# Patient Record
Sex: FEMALE | Race: WHITE | NOT HISPANIC OR LATINO | URBAN - METROPOLITAN AREA
[De-identification: names, ages, dates, MRNs, and addresses within clinical notes are randomized per-mention and may not be internally consistent; named-entity substitution may affect disease eponyms.]

---

## 2018-01-19 ENCOUNTER — INPATIENT (INPATIENT)
Facility: HOSPITAL | Age: 83
LOS: 13 days | Discharge: NURSING, REHAB OR LONG-TERM CARE | DRG: 640 | End: 2018-02-02
Attending: INTERNAL MEDICINE | Admitting: INTERNAL MEDICINE
Payer: MEDICARE

## 2018-01-19 VITALS
HEIGHT: 64 IN | TEMPERATURE: 98 F | WEIGHT: 139.99 LBS | RESPIRATION RATE: 16 BRPM | HEART RATE: 84 BPM | SYSTOLIC BLOOD PRESSURE: 100 MMHG | DIASTOLIC BLOOD PRESSURE: 66 MMHG | OXYGEN SATURATION: 96 %

## 2018-01-19 LAB
ALBUMIN SERPL ELPH-MCNC: 2.3 G/DL — LOW (ref 3.5–5)
ALP SERPL-CCNC: 135 U/L — HIGH (ref 40–120)
ALT FLD-CCNC: 15 U/L DA — SIGNIFICANT CHANGE UP (ref 10–60)
ANION GAP SERPL CALC-SCNC: 7 MMOL/L — SIGNIFICANT CHANGE UP (ref 5–17)
APPEARANCE UR: CLEAR — SIGNIFICANT CHANGE UP
AST SERPL-CCNC: 41 U/L — HIGH (ref 10–40)
BASOPHILS # BLD AUTO: 0.1 K/UL — SIGNIFICANT CHANGE UP (ref 0–0.2)
BASOPHILS NFR BLD AUTO: 1.3 % — SIGNIFICANT CHANGE UP (ref 0–2)
BILIRUB SERPL-MCNC: 0.4 MG/DL — SIGNIFICANT CHANGE UP (ref 0.2–1.2)
BILIRUB UR-MCNC: NEGATIVE — SIGNIFICANT CHANGE UP
BUN SERPL-MCNC: 30 MG/DL — HIGH (ref 7–18)
CALCIUM SERPL-MCNC: 8.7 MG/DL — SIGNIFICANT CHANGE UP (ref 8.4–10.5)
CHLORIDE SERPL-SCNC: 113 MMOL/L — HIGH (ref 96–108)
CO2 SERPL-SCNC: 27 MMOL/L — SIGNIFICANT CHANGE UP (ref 22–31)
COLOR SPEC: YELLOW — SIGNIFICANT CHANGE UP
CREAT SERPL-MCNC: 0.86 MG/DL — SIGNIFICANT CHANGE UP (ref 0.5–1.3)
DIFF PNL FLD: NEGATIVE — SIGNIFICANT CHANGE UP
EOSINOPHIL # BLD AUTO: 0 K/UL — SIGNIFICANT CHANGE UP (ref 0–0.5)
EOSINOPHIL NFR BLD AUTO: 0.8 % — SIGNIFICANT CHANGE UP (ref 0–6)
GLUCOSE SERPL-MCNC: 83 MG/DL — SIGNIFICANT CHANGE UP (ref 70–99)
GLUCOSE UR QL: NEGATIVE — SIGNIFICANT CHANGE UP
HCT VFR BLD CALC: 41.9 % — SIGNIFICANT CHANGE UP (ref 34.5–45)
HGB BLD-MCNC: 12.8 G/DL — SIGNIFICANT CHANGE UP (ref 11.5–15.5)
KETONES UR-MCNC: NEGATIVE — SIGNIFICANT CHANGE UP
LACTATE SERPL-SCNC: 1.4 MMOL/L — SIGNIFICANT CHANGE UP (ref 0.7–2)
LEUKOCYTE ESTERASE UR-ACNC: ABNORMAL
LYMPHOCYTES # BLD AUTO: 2.5 K/UL — SIGNIFICANT CHANGE UP (ref 1–3.3)
LYMPHOCYTES # BLD AUTO: 40.1 % — SIGNIFICANT CHANGE UP (ref 13–44)
MCHC RBC-ENTMCNC: 29.5 PG — SIGNIFICANT CHANGE UP (ref 27–34)
MCHC RBC-ENTMCNC: 30.4 GM/DL — LOW (ref 32–36)
MCV RBC AUTO: 96.8 FL — SIGNIFICANT CHANGE UP (ref 80–100)
MONOCYTES # BLD AUTO: 0.8 K/UL — SIGNIFICANT CHANGE UP (ref 0–0.9)
MONOCYTES NFR BLD AUTO: 12.9 % — SIGNIFICANT CHANGE UP (ref 2–14)
NEUTROPHILS # BLD AUTO: 2.8 K/UL — SIGNIFICANT CHANGE UP (ref 1.8–7.4)
NEUTROPHILS NFR BLD AUTO: 44.9 % — SIGNIFICANT CHANGE UP (ref 43–77)
NITRITE UR-MCNC: POSITIVE
PH UR: 5 — SIGNIFICANT CHANGE UP (ref 5–8)
PLATELET # BLD AUTO: 286 K/UL — SIGNIFICANT CHANGE UP (ref 150–400)
POTASSIUM SERPL-MCNC: 5 MMOL/L — SIGNIFICANT CHANGE UP (ref 3.5–5.3)
POTASSIUM SERPL-SCNC: 5 MMOL/L — SIGNIFICANT CHANGE UP (ref 3.5–5.3)
PROT SERPL-MCNC: 6.9 G/DL — SIGNIFICANT CHANGE UP (ref 6–8.3)
PROT UR-MCNC: 15
RBC # BLD: 4.33 M/UL — SIGNIFICANT CHANGE UP (ref 3.8–5.2)
RBC # FLD: 15.3 % — HIGH (ref 10.3–14.5)
SODIUM SERPL-SCNC: 147 MMOL/L — HIGH (ref 135–145)
SP GR SPEC: 1.02 — SIGNIFICANT CHANGE UP (ref 1.01–1.02)
UROBILINOGEN FLD QL: 1
WBC # BLD: 6.1 K/UL — SIGNIFICANT CHANGE UP (ref 3.8–10.5)
WBC # FLD AUTO: 6.1 K/UL — SIGNIFICANT CHANGE UP (ref 3.8–10.5)

## 2018-01-19 PROCEDURE — 71045 X-RAY EXAM CHEST 1 VIEW: CPT | Mod: 26

## 2018-01-19 PROCEDURE — 99285 EMERGENCY DEPT VISIT HI MDM: CPT

## 2018-01-19 RX ORDER — SODIUM CHLORIDE 9 MG/ML
1000 INJECTION INTRAMUSCULAR; INTRAVENOUS; SUBCUTANEOUS ONCE
Qty: 0 | Refills: 0 | Status: COMPLETED | OUTPATIENT
Start: 2018-01-19 | End: 2018-01-19

## 2018-01-19 RX ORDER — SODIUM CHLORIDE 9 MG/ML
3 INJECTION INTRAMUSCULAR; INTRAVENOUS; SUBCUTANEOUS ONCE
Qty: 0 | Refills: 0 | Status: COMPLETED | OUTPATIENT
Start: 2018-01-19 | End: 2018-01-19

## 2018-01-19 RX ORDER — CEFTRIAXONE 500 MG/1
1 INJECTION, POWDER, FOR SOLUTION INTRAMUSCULAR; INTRAVENOUS ONCE
Qty: 0 | Refills: 0 | Status: COMPLETED | OUTPATIENT
Start: 2018-01-19 | End: 2018-01-19

## 2018-01-19 RX ADMIN — SODIUM CHLORIDE 3 MILLILITER(S): 9 INJECTION INTRAMUSCULAR; INTRAVENOUS; SUBCUTANEOUS at 22:26

## 2018-01-19 RX ADMIN — SODIUM CHLORIDE 1000 MILLILITER(S): 9 INJECTION INTRAMUSCULAR; INTRAVENOUS; SUBCUTANEOUS at 23:48

## 2018-01-19 NOTE — ED ADULT TRIAGE NOTE - CHIEF COMPLAINT QUOTE
Patient was sent from nursing home for evaluation of failure to thrive, poor appetite, low oxygen saturation

## 2018-01-19 NOTE — ED ADULT NURSE NOTE - OBJECTIVE STATEMENT
Patient presents to ED from nursing home for PEG tube placement, and fever for several days in nursing home. Abdomen is soft, non tender, non distended. Daughter at bedside. Patient is awake, incoherent speech when asked questions. Moving all extremities. Breathing easy and unlabored, no use of accessory muscles.

## 2018-01-19 NOTE — ED PROVIDER NOTE - CARE PLAN
Principal Discharge DX:	UTI (urinary tract infection)  Secondary Diagnosis:	Failure to thrive in adult

## 2018-01-19 NOTE — ED PROVIDER NOTE - OBJECTIVE STATEMENT
87 y/o F pt BIB EMS for failure to thrive and need for peg tube placement; also reported fever for "several days." Nursing home staff attempted to place an IV today but were unsuccessful. Limited HPI due to dementia.l 89 y/o F pt BIB EMS for failure to thrive and need for peg tube placement; also reported fever for "several days." Nursing home staff attempted to place an IV today but were unsuccessful. Limited HPI due to dementia.

## 2018-01-19 NOTE — ED PROVIDER NOTE - MEDICAL DECISION MAKING DETAILS
87 y/o F pt brought in for failure to thrive. Will admit for infectious work up and peg tube placement. 89 y/o F pt brought in for failure to thrive. Will admit for infectious work up and peg tube placement.  labs reveal Urinary tract infection. admit for IV hydration IV antibiotics and possible peg placement

## 2018-01-20 DIAGNOSIS — I10 ESSENTIAL (PRIMARY) HYPERTENSION: ICD-10-CM

## 2018-01-20 DIAGNOSIS — R62.7 ADULT FAILURE TO THRIVE: ICD-10-CM

## 2018-01-20 DIAGNOSIS — Z29.9 ENCOUNTER FOR PROPHYLACTIC MEASURES, UNSPECIFIED: ICD-10-CM

## 2018-01-20 DIAGNOSIS — F03.90 UNSPECIFIED DEMENTIA WITHOUT BEHAVIORAL DISTURBANCE: ICD-10-CM

## 2018-01-20 DIAGNOSIS — K59.00 CONSTIPATION, UNSPECIFIED: ICD-10-CM

## 2018-01-20 DIAGNOSIS — N39.0 URINARY TRACT INFECTION, SITE NOT SPECIFIED: ICD-10-CM

## 2018-01-20 DIAGNOSIS — F32.9 MAJOR DEPRESSIVE DISORDER, SINGLE EPISODE, UNSPECIFIED: ICD-10-CM

## 2018-01-20 LAB
24R-OH-CALCIDIOL SERPL-MCNC: 44.6 NG/ML — SIGNIFICANT CHANGE UP (ref 30–80)
CHOLEST SERPL-MCNC: 139 MG/DL — SIGNIFICANT CHANGE UP (ref 10–199)
FOLATE SERPL-MCNC: 19 NG/ML — SIGNIFICANT CHANGE UP (ref 4.8–24.2)
GLUCOSE BLDC GLUCOMTR-MCNC: 102 MG/DL — HIGH (ref 70–99)
GLUCOSE BLDC GLUCOMTR-MCNC: 131 MG/DL — HIGH (ref 70–99)
GLUCOSE BLDC GLUCOMTR-MCNC: 182 MG/DL — HIGH (ref 70–99)
GLUCOSE BLDC GLUCOMTR-MCNC: 75 MG/DL — SIGNIFICANT CHANGE UP (ref 70–99)
GLUCOSE BLDC GLUCOMTR-MCNC: 88 MG/DL — SIGNIFICANT CHANGE UP (ref 70–99)
GLUCOSE BLDC GLUCOMTR-MCNC: 95 MG/DL — SIGNIFICANT CHANGE UP (ref 70–99)
HBA1C BLD-MCNC: 5.9 % — HIGH (ref 4–5.6)
HDLC SERPL-MCNC: 39 MG/DL — LOW (ref 40–125)
INR BLD: 1.1 RATIO — SIGNIFICANT CHANGE UP (ref 0.88–1.16)
LIPID PNL WITH DIRECT LDL SERPL: 78 MG/DL — SIGNIFICANT CHANGE UP
MAGNESIUM SERPL-MCNC: 2.3 MG/DL — SIGNIFICANT CHANGE UP (ref 1.6–2.6)
PHOSPHATE SERPL-MCNC: 3 MG/DL — SIGNIFICANT CHANGE UP (ref 2.5–4.5)
PROTHROM AB SERPL-ACNC: 12 SEC — SIGNIFICANT CHANGE UP (ref 9.8–12.7)
TOTAL CHOLESTEROL/HDL RATIO MEASUREMENT: 3.6 RATIO — SIGNIFICANT CHANGE UP (ref 3.3–7.1)
TRIGL SERPL-MCNC: 111 MG/DL — SIGNIFICANT CHANGE UP (ref 10–149)
TSH SERPL-MCNC: 0.4 UU/ML — SIGNIFICANT CHANGE UP (ref 0.34–4.82)
VIT B12 SERPL-MCNC: 1278 PG/ML — HIGH (ref 232–1245)

## 2018-01-20 RX ORDER — DEXTROSE 50 % IN WATER 50 %
25 SYRINGE (ML) INTRAVENOUS ONCE
Qty: 0 | Refills: 0 | Status: DISCONTINUED | OUTPATIENT
Start: 2018-01-20 | End: 2018-02-02

## 2018-01-20 RX ORDER — ASCORBIC ACID 60 MG
500 TABLET,CHEWABLE ORAL DAILY
Qty: 0 | Refills: 0 | Status: DISCONTINUED | OUTPATIENT
Start: 2018-01-20 | End: 2018-02-02

## 2018-01-20 RX ORDER — ACETAMINOPHEN 500 MG
650 TABLET ORAL EVERY 6 HOURS
Qty: 0 | Refills: 0 | Status: DISCONTINUED | OUTPATIENT
Start: 2018-01-20 | End: 2018-02-02

## 2018-01-20 RX ORDER — VENLAFAXINE HCL 75 MG
37.5 CAPSULE, EXT RELEASE 24 HR ORAL DAILY
Qty: 0 | Refills: 0 | Status: DISCONTINUED | OUTPATIENT
Start: 2018-01-20 | End: 2018-02-02

## 2018-01-20 RX ORDER — DEXTROSE 50 % IN WATER 50 %
50 SYRINGE (ML) INTRAVENOUS ONCE
Qty: 0 | Refills: 0 | Status: COMPLETED | OUTPATIENT
Start: 2018-01-20 | End: 2018-01-20

## 2018-01-20 RX ORDER — SODIUM CHLORIDE 9 MG/ML
500 INJECTION INTRAMUSCULAR; INTRAVENOUS; SUBCUTANEOUS ONCE
Qty: 0 | Refills: 0 | Status: COMPLETED | OUTPATIENT
Start: 2018-01-20 | End: 2018-01-20

## 2018-01-20 RX ORDER — DEXTROSE 50 % IN WATER 50 %
1 SYRINGE (ML) INTRAVENOUS ONCE
Qty: 0 | Refills: 0 | Status: DISCONTINUED | OUTPATIENT
Start: 2018-01-20 | End: 2018-02-02

## 2018-01-20 RX ORDER — SODIUM CHLORIDE 9 MG/ML
1000 INJECTION, SOLUTION INTRAVENOUS
Qty: 0 | Refills: 0 | Status: DISCONTINUED | OUTPATIENT
Start: 2018-01-20 | End: 2018-02-02

## 2018-01-20 RX ORDER — GLUCAGON INJECTION, SOLUTION 0.5 MG/.1ML
1 INJECTION, SOLUTION SUBCUTANEOUS ONCE
Qty: 0 | Refills: 0 | Status: DISCONTINUED | OUTPATIENT
Start: 2018-01-20 | End: 2018-02-02

## 2018-01-20 RX ORDER — SODIUM CHLORIDE 9 MG/ML
1000 INJECTION, SOLUTION INTRAVENOUS
Qty: 0 | Refills: 0 | Status: DISCONTINUED | OUTPATIENT
Start: 2018-01-20 | End: 2018-01-25

## 2018-01-20 RX ORDER — METOPROLOL TARTRATE 50 MG
50 TABLET ORAL
Qty: 0 | Refills: 0 | Status: DISCONTINUED | OUTPATIENT
Start: 2018-01-20 | End: 2018-01-20

## 2018-01-20 RX ORDER — SODIUM CHLORIDE 9 MG/ML
1000 INJECTION INTRAMUSCULAR; INTRAVENOUS; SUBCUTANEOUS
Qty: 0 | Refills: 0 | Status: DISCONTINUED | OUTPATIENT
Start: 2018-01-20 | End: 2018-01-20

## 2018-01-20 RX ORDER — INSULIN LISPRO 100/ML
VIAL (ML) SUBCUTANEOUS
Qty: 0 | Refills: 0 | Status: DISCONTINUED | OUTPATIENT
Start: 2018-01-20 | End: 2018-02-02

## 2018-01-20 RX ORDER — ENOXAPARIN SODIUM 100 MG/ML
40 INJECTION SUBCUTANEOUS DAILY
Qty: 0 | Refills: 0 | Status: DISCONTINUED | OUTPATIENT
Start: 2018-01-20 | End: 2018-01-24

## 2018-01-20 RX ORDER — DEXTROSE 50 % IN WATER 50 %
12.5 SYRINGE (ML) INTRAVENOUS ONCE
Qty: 0 | Refills: 0 | Status: DISCONTINUED | OUTPATIENT
Start: 2018-01-20 | End: 2018-02-02

## 2018-01-20 RX ORDER — CLOPIDOGREL BISULFATE 75 MG/1
75 TABLET, FILM COATED ORAL DAILY
Qty: 0 | Refills: 0 | Status: DISCONTINUED | OUTPATIENT
Start: 2018-01-20 | End: 2018-01-23

## 2018-01-20 RX ORDER — POLYETHYLENE GLYCOL 3350 17 G/17G
17 POWDER, FOR SOLUTION ORAL DAILY
Qty: 0 | Refills: 0 | Status: DISCONTINUED | OUTPATIENT
Start: 2018-01-20 | End: 2018-01-23

## 2018-01-20 RX ORDER — CEFTRIAXONE 500 MG/1
1 INJECTION, POWDER, FOR SOLUTION INTRAMUSCULAR; INTRAVENOUS EVERY 24 HOURS
Qty: 0 | Refills: 0 | Status: DISCONTINUED | OUTPATIENT
Start: 2018-01-20 | End: 2018-01-23

## 2018-01-20 RX ORDER — MIRTAZAPINE 45 MG/1
15 TABLET, ORALLY DISINTEGRATING ORAL AT BEDTIME
Qty: 0 | Refills: 0 | Status: DISCONTINUED | OUTPATIENT
Start: 2018-01-20 | End: 2018-02-02

## 2018-01-20 RX ORDER — DOCUSATE SODIUM 100 MG
100 CAPSULE ORAL
Qty: 0 | Refills: 0 | Status: DISCONTINUED | OUTPATIENT
Start: 2018-01-20 | End: 2018-02-02

## 2018-01-20 RX ADMIN — SODIUM CHLORIDE 75 MILLILITER(S): 9 INJECTION, SOLUTION INTRAVENOUS at 21:33

## 2018-01-20 RX ADMIN — CEFTRIAXONE 100 GRAM(S): 500 INJECTION, POWDER, FOR SOLUTION INTRAMUSCULAR; INTRAVENOUS at 02:59

## 2018-01-20 RX ADMIN — Medication 100 MILLIGRAM(S): at 17:28

## 2018-01-20 RX ADMIN — Medication 100 MILLIGRAM(S): at 05:55

## 2018-01-20 RX ADMIN — SODIUM CHLORIDE 75 MILLILITER(S): 9 INJECTION, SOLUTION INTRAVENOUS at 06:46

## 2018-01-20 RX ADMIN — SODIUM CHLORIDE 75 MILLILITER(S): 9 INJECTION, SOLUTION INTRAVENOUS at 12:14

## 2018-01-20 RX ADMIN — ENOXAPARIN SODIUM 40 MILLIGRAM(S): 100 INJECTION SUBCUTANEOUS at 12:14

## 2018-01-20 RX ADMIN — CLOPIDOGREL BISULFATE 75 MILLIGRAM(S): 75 TABLET, FILM COATED ORAL at 12:14

## 2018-01-20 RX ADMIN — Medication 37.5 MILLIGRAM(S): at 13:00

## 2018-01-20 RX ADMIN — Medication 500 MILLIGRAM(S): at 12:14

## 2018-01-20 RX ADMIN — SODIUM CHLORIDE 1000 MILLILITER(S): 9 INJECTION INTRAMUSCULAR; INTRAVENOUS; SUBCUTANEOUS at 06:43

## 2018-01-20 RX ADMIN — Medication 50 MILLILITER(S): at 06:43

## 2018-01-20 NOTE — H&P ADULT - NSHPPHYSICALEXAM_GEN_ALL_CORE
INTERVAL HPI/OVERNIGHT EVENTS:  T(C): 37.1 (01-20-18 @ 01:59), Max: 37.1 (01-20-18 @ 01:59)  HR: 81 (01-20-18 @ 01:59) (81 - 84)  BP: 120/92 (01-20-18 @ 01:59) (100/66 - 120/92)  RR: 18 (01-20-18 @ 03:09) (16 - 18)  SpO2: 96% (01-20-18 @ 03:09) (81% - 96%)    PHYSICAL EXAM:  GENERAL: NAD, well-groomed, well-developed  HEAD:  Atraumatic, Normocephalic  EYES: EOMI, PERRLA, conjunctiva and sclera clear  ENMT: No tonsillar erythema, exudates, or enlargement; dry mucous membranes  NECK: Supple, No JVD, Normal thyroid  NERVOUS SYSTEM:  Alert & awake  CHEST/LUNG: Clear to percussion bilaterally; No rales, rhonchi, wheezing, or rubs  HEART: Regular rate and rhythm; No murmurs, rubs, or gallops  ABDOMEN: Soft, Nontender, Nondistended; Bowel sounds present  EXTREMITIES:  2+ Peripheral Pulses, No clubbing, cyanosis, or edema  LYMPH: No lymphadenopathy noted  SKIN: No rashes or lesions

## 2018-01-20 NOTE — H&P ADULT - ASSESSMENT
Patient is a 88F from [facility], alert, non-ambulatory, with PMH Alzheimer's dementia, HTN, constipation, anxiety, dysphagia, heart disease, major depressive d/o, presenting with poor po intake and low BP in the NH. Admitted for UTI, dehydration and failure to thrive.

## 2018-01-20 NOTE — H&P ADULT - HISTORY OF PRESENT ILLNESS
Patient is a 88F from [facility], alert, non-ambulatory, with PMH Alzheimer's dementia, HTN, constipation, anxiety, dysphagia, heart disease, major depressive d/o, presenting with poor po intake and low BP in the NH. Patient is a 88F from [facility], alert, non-ambulatory, with PMH Alzheimer's dementia, HTN, constipation, anxiety, dysphagia, heart disease, major depressive d/o, presenting with poor po intake and low BP in the NH. Patient was seen and examined, awake, does not follow commands, appears comfortable. Spoke to daughter Phil at bedside, who is HCP, states that she wants patient to be DNR and DNI, despite prior MOLST. ROS unobtainable from patient due to dementia.    PMH: as per HPI  Surgical Hx: R hip replacement  Fam Hx: non contributory  Social Hx: negative for smoking, EtOH  Allergies: NKDA

## 2018-01-20 NOTE — H&P ADULT - NSHPLABSRESULTS_GEN_ALL_CORE
LABS:                        12.8   6.1   )-----------( 286      ( 2018 22:30 )             41.9         147<H>  |  113<H>  |  30<H>  ----------------------------<  83  5.0   |  27  |  0.86    Ca    8.7      2018 22:30    TPro  6.9  /  Alb  2.3<L>  /  TBili  0.4  /  DBili  x   /  AST  41<H>  /  ALT  15  /  AlkPhos  135<H>        Urinalysis Basic - ( 2018 23:22 )    Color: Yellow / Appearance: Clear / S.025 / pH: x  Gluc: x / Ketone: Negative  / Bili: Negative / Urobili: 1   Blood: x / Protein: 15 / Nitrite: Positive   Leuk Esterase: Small / RBC: 0-2 /HPF / WBC 6-10 /HPF   Sq Epi: x / Non Sq Epi: Few /HPF / Bacteria: Many /HPF

## 2018-01-21 LAB
ALBUMIN SERPL ELPH-MCNC: 1.9 G/DL — LOW (ref 3.5–5)
ALP SERPL-CCNC: 105 U/L — SIGNIFICANT CHANGE UP (ref 40–120)
ALT FLD-CCNC: 11 U/L DA — SIGNIFICANT CHANGE UP (ref 10–60)
ANION GAP SERPL CALC-SCNC: 5 MMOL/L — SIGNIFICANT CHANGE UP (ref 5–17)
AST SERPL-CCNC: 19 U/L — SIGNIFICANT CHANGE UP (ref 10–40)
BASOPHILS # BLD AUTO: 0 K/UL — SIGNIFICANT CHANGE UP (ref 0–0.2)
BASOPHILS NFR BLD AUTO: 0.6 % — SIGNIFICANT CHANGE UP (ref 0–2)
BILIRUB SERPL-MCNC: 0.1 MG/DL — LOW (ref 0.2–1.2)
BUN SERPL-MCNC: 14 MG/DL — SIGNIFICANT CHANGE UP (ref 7–18)
CALCIUM SERPL-MCNC: 8.1 MG/DL — LOW (ref 8.4–10.5)
CHLORIDE SERPL-SCNC: 119 MMOL/L — HIGH (ref 96–108)
CO2 SERPL-SCNC: 26 MMOL/L — SIGNIFICANT CHANGE UP (ref 22–31)
CREAT SERPL-MCNC: 0.55 MG/DL — SIGNIFICANT CHANGE UP (ref 0.5–1.3)
EOSINOPHIL # BLD AUTO: 0.1 K/UL — SIGNIFICANT CHANGE UP (ref 0–0.5)
EOSINOPHIL NFR BLD AUTO: 2.6 % — SIGNIFICANT CHANGE UP (ref 0–6)
GLUCOSE BLDC GLUCOMTR-MCNC: 79 MG/DL — SIGNIFICANT CHANGE UP (ref 70–99)
GLUCOSE BLDC GLUCOMTR-MCNC: 85 MG/DL — SIGNIFICANT CHANGE UP (ref 70–99)
GLUCOSE BLDC GLUCOMTR-MCNC: 85 MG/DL — SIGNIFICANT CHANGE UP (ref 70–99)
GLUCOSE BLDC GLUCOMTR-MCNC: 86 MG/DL — SIGNIFICANT CHANGE UP (ref 70–99)
GLUCOSE BLDC GLUCOMTR-MCNC: 97 MG/DL — SIGNIFICANT CHANGE UP (ref 70–99)
GLUCOSE SERPL-MCNC: 90 MG/DL — SIGNIFICANT CHANGE UP (ref 70–99)
HCT VFR BLD CALC: 32.3 % — LOW (ref 34.5–45)
HGB BLD-MCNC: 10.2 G/DL — LOW (ref 11.5–15.5)
LYMPHOCYTES # BLD AUTO: 2.1 K/UL — SIGNIFICANT CHANGE UP (ref 1–3.3)
LYMPHOCYTES # BLD AUTO: 53.5 % — HIGH (ref 13–44)
MAGNESIUM SERPL-MCNC: 2.2 MG/DL — SIGNIFICANT CHANGE UP (ref 1.6–2.6)
MCHC RBC-ENTMCNC: 30.9 PG — SIGNIFICANT CHANGE UP (ref 27–34)
MCHC RBC-ENTMCNC: 31.6 GM/DL — LOW (ref 32–36)
MCV RBC AUTO: 97.9 FL — SIGNIFICANT CHANGE UP (ref 80–100)
MONOCYTES # BLD AUTO: 0.5 K/UL — SIGNIFICANT CHANGE UP (ref 0–0.9)
MONOCYTES NFR BLD AUTO: 13 % — SIGNIFICANT CHANGE UP (ref 2–14)
NEUTROPHILS # BLD AUTO: 1.2 K/UL — LOW (ref 1.8–7.4)
NEUTROPHILS NFR BLD AUTO: 30.2 % — LOW (ref 43–77)
PHOSPHATE SERPL-MCNC: 2.4 MG/DL — LOW (ref 2.5–4.5)
PLATELET # BLD AUTO: 231 K/UL — SIGNIFICANT CHANGE UP (ref 150–400)
POTASSIUM SERPL-MCNC: 3.6 MMOL/L — SIGNIFICANT CHANGE UP (ref 3.5–5.3)
POTASSIUM SERPL-SCNC: 3.6 MMOL/L — SIGNIFICANT CHANGE UP (ref 3.5–5.3)
PROT SERPL-MCNC: 5.4 G/DL — LOW (ref 6–8.3)
RBC # BLD: 3.3 M/UL — LOW (ref 3.8–5.2)
RBC # FLD: 15.1 % — HIGH (ref 10.3–14.5)
SODIUM SERPL-SCNC: 150 MMOL/L — HIGH (ref 135–145)
WBC # BLD: 3.9 K/UL — SIGNIFICANT CHANGE UP (ref 3.8–10.5)
WBC # FLD AUTO: 3.9 K/UL — SIGNIFICANT CHANGE UP (ref 3.8–10.5)

## 2018-01-21 RX ADMIN — CLOPIDOGREL BISULFATE 75 MILLIGRAM(S): 75 TABLET, FILM COATED ORAL at 11:29

## 2018-01-21 RX ADMIN — Medication 100 MILLIGRAM(S): at 17:58

## 2018-01-21 RX ADMIN — CEFTRIAXONE 100 GRAM(S): 500 INJECTION, POWDER, FOR SOLUTION INTRAMUSCULAR; INTRAVENOUS at 02:58

## 2018-01-21 RX ADMIN — Medication 100 MILLIGRAM(S): at 06:00

## 2018-01-21 RX ADMIN — Medication 37.5 MILLIGRAM(S): at 11:29

## 2018-01-21 RX ADMIN — MIRTAZAPINE 15 MILLIGRAM(S): 45 TABLET, ORALLY DISINTEGRATING ORAL at 21:47

## 2018-01-21 RX ADMIN — ENOXAPARIN SODIUM 40 MILLIGRAM(S): 100 INJECTION SUBCUTANEOUS at 11:29

## 2018-01-21 RX ADMIN — POLYETHYLENE GLYCOL 3350 17 GRAM(S): 17 POWDER, FOR SOLUTION ORAL at 13:04

## 2018-01-21 RX ADMIN — Medication 500 MILLIGRAM(S): at 11:29

## 2018-01-21 NOTE — PROGRESS NOTE ADULT - PROBLEM SELECTOR PLAN 2
hypoalbuminemia  nutrition consult  dnr/dni  dysphagia diet  NEEDS PEG FAMILY AGREED HOLD PLAVIX X % DAYS BENEFIT OUT WEIGHT THE RISK

## 2018-01-21 NOTE — DIETITIAN INITIAL EVALUATION ADULT. - NUTRITION INTERVENTION
Feeding Assistance/Meals and Snack/Collaboration and Referral of Nutrition Care/Vitamin/Enteral Nutrition

## 2018-01-21 NOTE — PROGRESS NOTE ADULT - SUBJECTIVE AND OBJECTIVE BOX
Patient was seen and examined  Patient is a 88y old  Female who presents with a chief complaint of failure to thrive (2018 03:27)      INTERVAL HPI/OVERNIGHT EVENTS:  T(C): 36.5 (18 @ 13:32), Max: 36.5 (18 @ 13:32)  HR: 80 (18 @ 13:32) (65 - 80)  BP: 110/87 (18 @ 13:32) (99/49 - 110/87)  RR: 18 (18 @ 13:32) (18 - 20)  SpO2: 96% (18 @ 13:32) (95% - 96%)  Wt(kg): --  I&O's Summary      LABS:                        10.2   3.9   )-----------( 231      ( 2018 06:11 )             32.3     -    150<H>  |  119<H>  |  14  ----------------------------<  90  3.6   |  26  |  0.55    Ca    8.1<L>      2018 06:11  Phos  2.4       Mg     2.2         TPro  5.4<L>  /  Alb  1.9<L>  /  TBili  0.1<L>  /  DBili  x   /  AST  19  /  ALT  11  /  AlkPhos  105  -    PT/INR - ( 2018 08:38 )   PT: 12.0 sec;   INR: 1.10 ratio           Urinalysis Basic - ( 2018 23:22 )    Color: Yellow / Appearance: Clear / S.025 / pH: x  Gluc: x / Ketone: Negative  / Bili: Negative / Urobili: 1   Blood: x / Protein: 15 / Nitrite: Positive   Leuk Esterase: Small / RBC: 0-2 /HPF / WBC 6-10 /HPF   Sq Epi: x / Non Sq Epi: Few /HPF / Bacteria: Many /HPF      CAPILLARY BLOOD GLUCOSE      POCT Blood Glucose.: 97 mg/dL (2018 17:08)  POCT Blood Glucose.: 85 mg/dL (2018 11:12)  POCT Blood Glucose.: 86 mg/dL (2018 07:53)  POCT Blood Glucose.: 102 mg/dL (2018 21:03)    LIPID PANEL  Cholesterol 139  LDL 78  HDL 39  RATIO HDL/Total Cholesterol --  Triglyceride 111        Urinalysis Basic - ( 2018 23:22 )    Color: Yellow / Appearance: Clear / S.025 / pH: x  Gluc: x / Ketone: Negative  / Bili: Negative / Urobili: 1   Blood: x / Protein: 15 / Nitrite: Positive   Leuk Esterase: Small / RBC: 0-2 /HPF / WBC 6-10 /HPF   Sq Epi: x / Non Sq Epi: Few /HPF / Bacteria: Many /HPF        MEDICATIONS  (STANDING):  ascorbic acid 500 milliGRAM(s) Oral daily  cefTRIAXone   IVPB 1 Gram(s) IV Intermittent every 24 hours  clopidogrel Tablet 75 milliGRAM(s) Oral daily  dextrose 5% + sodium chloride 0.9%. 1000 milliLiter(s) (75 mL/Hr) IV Continuous <Continuous>  dextrose 5%. 1000 milliLiter(s) (50 mL/Hr) IV Continuous <Continuous>  dextrose 50% Injectable 12.5 Gram(s) IV Push once  dextrose 50% Injectable 25 Gram(s) IV Push once  dextrose 50% Injectable 25 Gram(s) IV Push once  docusate sodium 100 milliGRAM(s) Oral two times a day  enoxaparin Injectable 40 milliGRAM(s) SubCutaneous daily  insulin lispro (HumaLOG) corrective regimen sliding scale   SubCutaneous three times a day before meals  mirtazapine 15 milliGRAM(s) Oral at bedtime  polyethylene glycol 3350 17 Gram(s) Oral daily  venlafaxine XR. 37.5 milliGRAM(s) Oral daily    MEDICATIONS  (PRN):  acetaminophen   Tablet 650 milliGRAM(s) Oral every 6 hours PRN mild pain 1-3  dextrose Gel 1 Dose(s) Oral once PRN Blood Glucose LESS THAN 70 milliGRAM(s)/deciliter  glucagon  Injectable 1 milliGRAM(s) IntraMuscular once PRN Glucose LESS THAN 70 milligrams/deciliter  sodium biphosphate Rectal Enema 1 Enema Rectal daily PRN constipation      RADIOLOGY & ADDITIONAL TESTS:    Imaging Personally Reviewed:  [ ] YES  [ ] NO    REVIEW OF SYSTEMS:  nad       Consultant(s) Notes Reviewed:  [ x ] YES  [ ] NO    PHYSICAL EXAM:  GENERAL: NAD, well-groomed, well-developed  HEAD:  Atraumatic, Normocephalic  EYES: EOMI, PERRLA, conjunctiva and sclera clear  ENMT: No tonsillar erythema, exudates, or enlargement; Moist mucous membranes, Good dentition, No lesions  NECK: Supple, No JVD, Normal thyroid  NERVOUS SYSTEM: awake no asymetry   CHEST/LUNG: Clear to percussion bilaterally; No rales, rhonchi, wheezing, or rubs  HEART: Regular rate and rhythm; No murmurs, rubs, or gallops  ABDOMEN: Soft, Nontender, Nondistended; Bowel sounds present  EXTREMITIES:  2+ Peripheral Pulses, No clubbing, cyanosis, or edema  LYMPH: No lymphadenopathy noted  SKIN: No rashes or lesions    Care Discussed with Consultants/Other Providers [ x] YES  [ ] NO

## 2018-01-21 NOTE — DIETITIAN INITIAL EVALUATION ADULT. - FACTORS AFF FOOD INTAKE
weakness, depression/other (specify)/persistent constipation/change in mental status/difficulty feeding self/difficulty swallowing/difficulty with food procurement/preparation

## 2018-01-21 NOTE — DIETITIAN INITIAL EVALUATION ADULT. - OTHER INFO
Nutrition consult requested for assessment & failure to thrive. Patient from [facility] & transfer to [facility] for PEG placement. Visited pt. with RN at bedside, pt. alert but very confused, per RN pt. not eating so well, consuming 30% of meals & also per chart pt. came with poor oral intake. RN stated pt. pending possible PEG placement on Mon. 1/22/18, family also in agreement for PEG & followed by GI/team, presently encourage po intake w/feeding assistance, skin intact. D/W RN.

## 2018-01-21 NOTE — CONSULT NOTE ADULT - SUBJECTIVE AND OBJECTIVE BOX
GI INITIAL CONSULT    HPI: 88F w/dementia p/w poor PO intake and likely hypovolemia. No reported abd pain, N/V, diarrhea, or bleeding. Pt's daughter wants a PEG placed for nutrition and hydration. Pt is DNR/DNI.    PMH: Alzheimer's dementia, HTN, dysphagia, ISABEL  PSH: no h/o abd surgeries    Meds: MEDICATIONS  (STANDING):  ascorbic acid 500 milliGRAM(s) Oral daily  cefTRIAXone   IVPB 1 Gram(s) IV Intermittent every 24 hours  clopidogrel Tablet 75 milliGRAM(s) Oral daily  dextrose 5% + sodium chloride 0.9%. 1000 milliLiter(s) (75 mL/Hr) IV Continuous <Continuous>  dextrose 5%. 1000 milliLiter(s) (50 mL/Hr) IV Continuous <Continuous>  dextrose 50% Injectable 12.5 Gram(s) IV Push once  dextrose 50% Injectable 25 Gram(s) IV Push once  dextrose 50% Injectable 25 Gram(s) IV Push once  docusate sodium 100 milliGRAM(s) Oral two times a day  enoxaparin Injectable 40 milliGRAM(s) SubCutaneous daily  insulin lispro (HumaLOG) corrective regimen sliding scale   SubCutaneous three times a day before meals  mirtazapine 15 milliGRAM(s) Oral at bedtime  polyethylene glycol 3350 17 Gram(s) Oral daily  venlafaxine XR. 37.5 milliGRAM(s) Oral daily    SH: unable to obtain  FH: unable to obtain  ROS: unable to obtain    Vitals: T(C): 36.5 (01-21-18 @ 13:32)  T(F): 97.7 (01-21-18 @ 13:32), Max: 97.7 (01-21-18 @ 13:32)  HR: 80 (01-21-18 @ 13:32) (65 - 80)  BP: 110/87 (01-21-18 @ 13:32) (99/49 - 110/87)    Gen: NAD  CVS: RRR; S1/S2  Chest: CTABL  Abd: S/NT/ND; no surgical scars noted                        10.2   3.9   )-----------( 231      ( 21 Jan 2018 06:11 )             32.3   01-21    150<H>  |  119<H>  |  14  ----------------------------<  90  3.6   |  26  |  0.55    Ca    8.1<L>      21 Jan 2018 06:11  Phos  2.4     01-21  Mg     2.2     01-21    TPro  5.4<L>  /  Alb  1.9<L>  /  TBili  0.1<L>  /  DBili  x   /  AST  19  /  ALT  11  /  AlkPhos  105  01-21

## 2018-01-21 NOTE — CONSULT NOTE ADULT - ASSESSMENT
88F w/severe dementia has dysphagia and failure to thrive.  -family wants a PEG tube placed for nutrition and hydration  -pt is currently on Lovenox for DVT ppx and Plavix for an unclear reason  -pt will need to be off Lovenox for at least 12 hrs and off Plavix for 5 days prior to PEG placement  -will plan for PEG placement once is can be off Plavix for 5 days

## 2018-01-21 NOTE — DIETITIAN INITIAL EVALUATION ADULT. - MD RECOMMEND
po supplement/Presently Add Ensure Pudding 120ml x tid (510kcal 12g protein) if medically feasible to Dysphagia 1 Pureed -Logan consistency diet. IF tube feeding is considered recommend, Goal: Jevity 1.5 @ 60ml/h x 16h (960ml, 1440kcal, 61g protein, 729ml water daily at goal)  MD to adjust free water as needed

## 2018-01-22 DIAGNOSIS — R53.2 FUNCTIONAL QUADRIPLEGIA: ICD-10-CM

## 2018-01-22 DIAGNOSIS — E43 UNSPECIFIED SEVERE PROTEIN-CALORIE MALNUTRITION: ICD-10-CM

## 2018-01-22 DIAGNOSIS — N39.0 URINARY TRACT INFECTION, SITE NOT SPECIFIED: ICD-10-CM

## 2018-01-22 DIAGNOSIS — Z51.5 ENCOUNTER FOR PALLIATIVE CARE: ICD-10-CM

## 2018-01-22 DIAGNOSIS — G30.1 ALZHEIMER'S DISEASE WITH LATE ONSET: ICD-10-CM

## 2018-01-22 LAB
-  AMIKACIN: SIGNIFICANT CHANGE UP
-  AMPICILLIN/SULBACTAM: SIGNIFICANT CHANGE UP
-  AMPICILLIN: SIGNIFICANT CHANGE UP
-  AZTREONAM: SIGNIFICANT CHANGE UP
-  CEFAZOLIN: SIGNIFICANT CHANGE UP
-  CEFEPIME: SIGNIFICANT CHANGE UP
-  CEFOXITIN: SIGNIFICANT CHANGE UP
-  CEFTAZIDIME: SIGNIFICANT CHANGE UP
-  CEFTRIAXONE: SIGNIFICANT CHANGE UP
-  CIPROFLOXACIN: SIGNIFICANT CHANGE UP
-  ERTAPENEM: SIGNIFICANT CHANGE UP
-  GENTAMICIN: SIGNIFICANT CHANGE UP
-  IMIPENEM: SIGNIFICANT CHANGE UP
-  LEVOFLOXACIN: SIGNIFICANT CHANGE UP
-  MEROPENEM: SIGNIFICANT CHANGE UP
-  NITROFURANTOIN: SIGNIFICANT CHANGE UP
-  PIPERACILLIN/TAZOBACTAM: SIGNIFICANT CHANGE UP
-  TOBRAMYCIN: SIGNIFICANT CHANGE UP
-  TRIMETHOPRIM/SULFAMETHOXAZOLE: SIGNIFICANT CHANGE UP
ALBUMIN SERPL ELPH-MCNC: 1.9 G/DL — LOW (ref 3.5–5)
ALP SERPL-CCNC: 110 U/L — SIGNIFICANT CHANGE UP (ref 40–120)
ALT FLD-CCNC: 8 U/L DA — LOW (ref 10–60)
ANION GAP SERPL CALC-SCNC: 8 MMOL/L — SIGNIFICANT CHANGE UP (ref 5–17)
AST SERPL-CCNC: 19 U/L — SIGNIFICANT CHANGE UP (ref 10–40)
BILIRUB SERPL-MCNC: 0.2 MG/DL — SIGNIFICANT CHANGE UP (ref 0.2–1.2)
BUN SERPL-MCNC: 8 MG/DL — SIGNIFICANT CHANGE UP (ref 7–18)
CALCIUM SERPL-MCNC: 8.2 MG/DL — LOW (ref 8.4–10.5)
CHLORIDE SERPL-SCNC: 116 MMOL/L — HIGH (ref 96–108)
CO2 SERPL-SCNC: 26 MMOL/L — SIGNIFICANT CHANGE UP (ref 22–31)
CREAT SERPL-MCNC: 0.52 MG/DL — SIGNIFICANT CHANGE UP (ref 0.5–1.3)
CULTURE RESULTS: SIGNIFICANT CHANGE UP
EOSINOPHIL NFR BLD AUTO: 3 % — SIGNIFICANT CHANGE UP (ref 0–6)
GLUCOSE BLDC GLUCOMTR-MCNC: 102 MG/DL — HIGH (ref 70–99)
GLUCOSE BLDC GLUCOMTR-MCNC: 119 MG/DL — HIGH (ref 70–99)
GLUCOSE BLDC GLUCOMTR-MCNC: 85 MG/DL — SIGNIFICANT CHANGE UP (ref 70–99)
GLUCOSE BLDC GLUCOMTR-MCNC: 89 MG/DL — SIGNIFICANT CHANGE UP (ref 70–99)
GLUCOSE SERPL-MCNC: 90 MG/DL — SIGNIFICANT CHANGE UP (ref 70–99)
HCT VFR BLD CALC: 32.1 % — LOW (ref 34.5–45)
HGB BLD-MCNC: 10 G/DL — LOW (ref 11.5–15.5)
LYMPHOCYTES # BLD AUTO: 48 % — HIGH (ref 13–44)
MAGNESIUM SERPL-MCNC: 2.1 MG/DL — SIGNIFICANT CHANGE UP (ref 1.6–2.6)
MCHC RBC-ENTMCNC: 29.7 PG — SIGNIFICANT CHANGE UP (ref 27–34)
MCHC RBC-ENTMCNC: 31 GM/DL — LOW (ref 32–36)
MCV RBC AUTO: 95.7 FL — SIGNIFICANT CHANGE UP (ref 80–100)
METHOD TYPE: SIGNIFICANT CHANGE UP
MONOCYTES NFR BLD AUTO: 13 % — SIGNIFICANT CHANGE UP (ref 2–14)
NEUTROPHILS NFR BLD AUTO: 22 % — LOW (ref 43–77)
ORGANISM # SPEC MICROSCOPIC CNT: SIGNIFICANT CHANGE UP
ORGANISM # SPEC MICROSCOPIC CNT: SIGNIFICANT CHANGE UP
PHOSPHATE SERPL-MCNC: 2.5 MG/DL — SIGNIFICANT CHANGE UP (ref 2.5–4.5)
PLATELET # BLD AUTO: 245 K/UL — SIGNIFICANT CHANGE UP (ref 150–400)
POTASSIUM SERPL-MCNC: 3.7 MMOL/L — SIGNIFICANT CHANGE UP (ref 3.5–5.3)
POTASSIUM SERPL-SCNC: 3.7 MMOL/L — SIGNIFICANT CHANGE UP (ref 3.5–5.3)
PROT SERPL-MCNC: 5.2 G/DL — LOW (ref 6–8.3)
RBC # BLD: 3.35 M/UL — LOW (ref 3.8–5.2)
RBC # FLD: 14.7 % — HIGH (ref 10.3–14.5)
SODIUM SERPL-SCNC: 150 MMOL/L — HIGH (ref 135–145)
SPECIMEN SOURCE: SIGNIFICANT CHANGE UP
WBC # BLD: 3.3 K/UL — LOW (ref 3.8–10.5)
WBC # FLD AUTO: 3.3 K/UL — LOW (ref 3.8–10.5)

## 2018-01-22 PROCEDURE — 99223 1ST HOSP IP/OBS HIGH 75: CPT

## 2018-01-22 RX ORDER — POTASSIUM CHLORIDE 20 MEQ
30 PACKET (EA) ORAL ONCE
Qty: 0 | Refills: 0 | Status: DISCONTINUED | OUTPATIENT
Start: 2018-01-22 | End: 2018-01-22

## 2018-01-22 RX ORDER — POTASSIUM CHLORIDE 20 MEQ
40 PACKET (EA) ORAL ONCE
Qty: 0 | Refills: 0 | Status: COMPLETED | OUTPATIENT
Start: 2018-01-22 | End: 2018-01-22

## 2018-01-22 RX ADMIN — Medication 100 MILLIGRAM(S): at 18:05

## 2018-01-22 RX ADMIN — Medication 40 MILLIEQUIVALENT(S): at 13:46

## 2018-01-22 RX ADMIN — SODIUM CHLORIDE 75 MILLILITER(S): 9 INJECTION, SOLUTION INTRAVENOUS at 22:32

## 2018-01-22 RX ADMIN — CEFTRIAXONE 100 GRAM(S): 500 INJECTION, POWDER, FOR SOLUTION INTRAMUSCULAR; INTRAVENOUS at 02:43

## 2018-01-22 RX ADMIN — POLYETHYLENE GLYCOL 3350 17 GRAM(S): 17 POWDER, FOR SOLUTION ORAL at 12:14

## 2018-01-22 RX ADMIN — Medication 500 MILLIGRAM(S): at 12:11

## 2018-01-22 RX ADMIN — Medication 37.5 MILLIGRAM(S): at 12:14

## 2018-01-22 RX ADMIN — SODIUM CHLORIDE 75 MILLILITER(S): 9 INJECTION, SOLUTION INTRAVENOUS at 18:06

## 2018-01-22 RX ADMIN — CLOPIDOGREL BISULFATE 75 MILLIGRAM(S): 75 TABLET, FILM COATED ORAL at 12:11

## 2018-01-22 RX ADMIN — Medication 100 MILLIGRAM(S): at 05:41

## 2018-01-22 RX ADMIN — MIRTAZAPINE 15 MILLIGRAM(S): 45 TABLET, ORALLY DISINTEGRATING ORAL at 22:32

## 2018-01-22 RX ADMIN — ENOXAPARIN SODIUM 40 MILLIGRAM(S): 100 INJECTION SUBCUTANEOUS at 12:14

## 2018-01-22 NOTE — CONSULT NOTE ADULT - PROBLEM SELECTOR RECOMMENDATION 2
2/2 advanced dementia. Patient with poor PO intake; noted with loss of muscle mass. Albumin 1.9. Daughter/HCP interested in PEG placement.

## 2018-01-22 NOTE — PROGRESS NOTE ADULT - SUBJECTIVE AND OBJECTIVE BOX
NP Note discussed with  Primary Attending    Patient is a 88y old  Female who presents with a chief complaint of failure to thrive (20 Jan 2018 03:27)      INTERVAL HPI:  HPI:  Patient is a 88F from [facility], alert, non-ambulatory, with PMH Alzheimer's dementia, HTN, constipation, anxiety, dysphagia, heart disease, major depressive d/o, presenting with poor po intake and low BP in the NH. Patient was seen and examined, awake, does not follow commands, appears comfortable. Spoke to daughter Phil at bedside, who is HCP, states that she wants patient to be DNR and DNI, despite prior MOLST. ROS unobtainable from patient due to dementia. Pt was started on IVF and treated w/ ceftriaxone for a UTI. Surgery discussed PEG placement vs palliative for pt given FTT and family wishes to have PEG placed.     Interval Hx: Spoke to Dr. Goetz and grandson regarding why the pt is on plavix in order to discontinue it for PEG. Dr. Goetz is new to pt and grandson is unsure. Grandson states he will get information asap.     MEDICATIONS  (STANDING):  ascorbic acid 500 milliGRAM(s) Oral daily  cefTRIAXone   IVPB 1 Gram(s) IV Intermittent every 24 hours  clopidogrel Tablet 75 milliGRAM(s) Oral daily  dextrose 5% + sodium chloride 0.9%. 1000 milliLiter(s) (75 mL/Hr) IV Continuous <Continuous>  dextrose 5%. 1000 milliLiter(s) (50 mL/Hr) IV Continuous <Continuous>  dextrose 50% Injectable 12.5 Gram(s) IV Push once  dextrose 50% Injectable 25 Gram(s) IV Push once  dextrose 50% Injectable 25 Gram(s) IV Push once  docusate sodium 100 milliGRAM(s) Oral two times a day  enoxaparin Injectable 40 milliGRAM(s) SubCutaneous daily  insulin lispro (HumaLOG) corrective regimen sliding scale   SubCutaneous three times a day before meals  mirtazapine 15 milliGRAM(s) Oral at bedtime  polyethylene glycol 3350 17 Gram(s) Oral daily  venlafaxine XR. 37.5 milliGRAM(s) Oral daily    MEDICATIONS  (PRN):  acetaminophen   Tablet 650 milliGRAM(s) Oral every 6 hours PRN mild pain 1-3  dextrose Gel 1 Dose(s) Oral once PRN Blood Glucose LESS THAN 70 milliGRAM(s)/deciliter  glucagon  Injectable 1 milliGRAM(s) IntraMuscular once PRN Glucose LESS THAN 70 milligrams/deciliter  sodium biphosphate Rectal Enema 1 Enema Rectal daily PRN constipation  __________________________________________________  REVIEW OF SYSTEMS:  Deferred as pt nonverbal    Vital Signs Last 24 Hrs  T(C): 37.4 (22 Jan 2018 14:24), Max: 37.4 (22 Jan 2018 14:24)  T(F): 99.4 (22 Jan 2018 14:24), Max: 99.4 (22 Jan 2018 14:24)  HR: 70 (22 Jan 2018 14:24) (66 - 70)  BP: 116/59 (22 Jan 2018 14:24) (92/44 - 116/59)  BP(mean): --  RR: 16 (22 Jan 2018 14:24) (16 - 22)  SpO2: 96% (22 Jan 2018 14:24) (94% - 96%)    ________________________________________________  PHYSICAL EXAM:  GENERAL: NAD  HEENT: Normocephalic;  conjunctivae and sclerae clear; moist mucous membranes;   NECK : supple  CHEST/LUNG: Clear to auscultation bilaterally with good air entry   HEART: S1 S2  regular; no murmurs, gallops or rubs  ABDOMEN: Soft, Nontender, Nondistended; Bowel sounds present  EXTREMITIES: no cyanosis; no edema; no calf tenderness  SKIN: warm and dry; no rash  NERVOUS SYSTEM:  Awake, nonverbal  ________________________________________  LABS:                        10.0   3.3   )-----------( 245      ( 22 Jan 2018 06:59 )             32.1     01-22    150<H>  |  116<H>  |  8   ----------------------------<  90  3.7   |  26  |  0.52    Ca    8.2<L>      22 Jan 2018 06:59  Phos  2.5     01-22  Mg     2.1     01-22    TPro  5.2<L>  /  Alb  1.9<L>  /  TBili  0.2  /  DBili  x   /  AST  19  /  ALT  8<L>  /  AlkPhos  110  01-22        CAPILLARY BLOOD GLUCOSE      POCT Blood Glucose.: 102 mg/dL (22 Jan 2018 11:46)  POCT Blood Glucose.: 89 mg/dL (22 Jan 2018 07:50)  POCT Blood Glucose.: 85 mg/dL (21 Jan 2018 23:30)  POCT Blood Glucose.: 79 mg/dL (21 Jan 2018 21:52)  POCT Blood Glucose.: 97 mg/dL (21 Jan 2018 17:08)        RADIOLOGY & ADDITIONAL TESTS:    Imaging Personally Reviewed:  YES    Consultant(s) Notes Reviewed:   YES    Care Discussed with Consultants :     Plan of care was discussed with patient and /or primary care giver; all questions and concerns were addressed and care was aligned with patient's wishes.

## 2018-01-22 NOTE — CONSULT NOTE ADULT - SUBJECTIVE AND OBJECTIVE BOX
HPI:  Patient is a 88F from [facility], alert, non-ambulatory, with PMH Alzheimer's dementia, HTN, constipation, anxiety, dysphagia, heart disease, major depressive d/o, presenting with poor po intake and low BP in the NH. Admitted for UTI/FTT - dtr interested in PEG placement. DNR/DNI on file.     PMH: as per HPI  Surgical Hx: R hip replacement  Fam Hx: non contributory  Social Hx: negative for smoking, EtOH  Allergies: NKDA (20 Jan 2018 03:27)      PAST MEDICAL & SURGICAL HISTORY:  Constipation  Depression  Hypertension  Dementia  No significant past surgical history      SOCIAL HISTORY:    Admitted from:  [facility]  Home Opioid hx: none  Preferred Language: English    Surrogate/HCP/Guardian:  Phil Jonhston (dtr/HCP): [phone]    FAMILY HISTORY: noncontributory to current condition  No pertinent family history in first degree relatives    Baseline ADLs (prior to admission): nonambulatory, dependent on ADLs.     No Known Allergies    Present Symptoms:      Review of Systems:Unable to obtain due to poor mentation    MEDICATIONS  (STANDING):  ascorbic acid 500 milliGRAM(s) Oral daily  cefTRIAXone   IVPB 1 Gram(s) IV Intermittent every 24 hours  clopidogrel Tablet 75 milliGRAM(s) Oral daily  dextrose 5% + sodium chloride 0.9%. 1000 milliLiter(s) (75 mL/Hr) IV Continuous <Continuous>  dextrose 5%. 1000 milliLiter(s) (50 mL/Hr) IV Continuous <Continuous>  dextrose 50% Injectable 12.5 Gram(s) IV Push once  dextrose 50% Injectable 25 Gram(s) IV Push once  dextrose 50% Injectable 25 Gram(s) IV Push once  docusate sodium 100 milliGRAM(s) Oral two times a day  enoxaparin Injectable 40 milliGRAM(s) SubCutaneous daily  insulin lispro (HumaLOG) corrective regimen sliding scale   SubCutaneous three times a day before meals  mirtazapine 15 milliGRAM(s) Oral at bedtime  polyethylene glycol 3350 17 Gram(s) Oral daily  venlafaxine XR. 37.5 milliGRAM(s) Oral daily    MEDICATIONS  (PRN):  acetaminophen   Tablet 650 milliGRAM(s) Oral every 6 hours PRN mild pain 1-3  dextrose Gel 1 Dose(s) Oral once PRN Blood Glucose LESS THAN 70 milliGRAM(s)/deciliter  glucagon  Injectable 1 milliGRAM(s) IntraMuscular once PRN Glucose LESS THAN 70 milligrams/deciliter  sodium biphosphate Rectal Enema 1 Enema Rectal daily PRN constipation      PHYSICAL EXAM:    Vital Signs Last 24 Hrs  T(C): 36.6 (22 Jan 2018 04:50), Max: 36.7 (21 Jan 2018 22:03)  T(F): 97.9 (22 Jan 2018 04:50), Max: 98.1 (21 Jan 2018 22:03)  HR: 69 (22 Jan 2018 04:50) (66 - 69)  BP: 101/51 (22 Jan 2018 04:50) (92/44 - 101/51)  BP(mean): --  RR: 18 (22 Jan 2018 04:50) (18 - 22)  SpO2: 96% (22 Jan 2018 04:50) (94% - 96%)    General: alert , not oriented, nonverbal, unable to follow commands, in no acute distress  Karnofsky Performance Score/Palliative Performance Status Version2:    30%    HEENT: dry lips  Lungs: comfortable, on room air  CV: normal    GI:  incontinent  :  incontinent   Musculoskeletal: nonambulatory, dependent on ADLs, unable to follow commands, loss of muscle mass  Skin: normal    Neuro: cognitive impairment dysphagia  Oral intake ability: minimal	  Diet: pureed/honey    LABS:                        10.0   3.3   )-----------( 245      ( 22 Jan 2018 06:59 )             32.1     01-22    150<H>  |  116<H>  |  8   ----------------------------<  90  3.7   |  26  |  0.52    Ca    8.2<L>      22 Jan 2018 06:59  Phos  2.5     01-22  Mg     2.1     01-22    TPro  5.2<L>  /  Alb  1.9<L>  /  TBili  0.2  /  DBili  x   /  AST  19  /  ALT  8<L>  /  AlkPhos  110  01-22    Albumin: 1.9    RADIOLOGY & ADDITIONAL STUDIES: reviewed    ADVANCE DIRECTIVES: DNR / DNI on file.

## 2018-01-22 NOTE — PROGRESS NOTE ADULT - ATTENDING COMMENTS
Events noted Patient  is  non verbal  treatment  for UTI in progress    Failure to thrive  patient  family would  like  PEG placement    Dementia AT stable  Quadriplegia  supportive  care

## 2018-01-22 NOTE — PROGRESS NOTE ADULT - PROBLEM SELECTOR PLAN 2
- Family wishing to have peg placed; will need to be off Plavix x 5 days. Grandson to find out why pt is on plavix and who prescribed it to assess safety of holding anti-platelet. ( PCP unsure why pt is on plavix)  - Continue gentle hydration for now. Palliative consulted for [facility]

## 2018-01-22 NOTE — CONSULT NOTE ADULT - PROBLEM SELECTOR RECOMMENDATION 9
FAST 7D. Patient nonverbal, nonambulatory, dependent on ADLs. Unable to follow commands. DNR / DNI on file.

## 2018-01-22 NOTE — CONSULT NOTE ADULT - PROBLEM SELECTOR RECOMMENDATION 4
Patient's daughter, Phil Johnston ([phone]) is HCP/surrogate. DNR/DNI on file. Left message for daughter requesting return call to further discuss PEG and reviewed advance directives/MOLST.

## 2018-01-23 DIAGNOSIS — A49.9 BACTERIAL INFECTION, UNSPECIFIED: ICD-10-CM

## 2018-01-23 LAB
ANION GAP SERPL CALC-SCNC: 7 MMOL/L — SIGNIFICANT CHANGE UP (ref 5–17)
BUN SERPL-MCNC: 4 MG/DL — LOW (ref 7–18)
CALCIUM SERPL-MCNC: 8.1 MG/DL — LOW (ref 8.4–10.5)
CHLORIDE SERPL-SCNC: 113 MMOL/L — HIGH (ref 96–108)
CO2 SERPL-SCNC: 26 MMOL/L — SIGNIFICANT CHANGE UP (ref 22–31)
CREAT SERPL-MCNC: 0.41 MG/DL — LOW (ref 0.5–1.3)
GLUCOSE BLDC GLUCOMTR-MCNC: 100 MG/DL — HIGH (ref 70–99)
GLUCOSE BLDC GLUCOMTR-MCNC: 115 MG/DL — HIGH (ref 70–99)
GLUCOSE BLDC GLUCOMTR-MCNC: 74 MG/DL — SIGNIFICANT CHANGE UP (ref 70–99)
GLUCOSE BLDC GLUCOMTR-MCNC: 91 MG/DL — SIGNIFICANT CHANGE UP (ref 70–99)
GLUCOSE SERPL-MCNC: 74 MG/DL — SIGNIFICANT CHANGE UP (ref 70–99)
HCT VFR BLD CALC: 33.4 % — LOW (ref 34.5–45)
HGB BLD-MCNC: 10.3 G/DL — LOW (ref 11.5–15.5)
MAGNESIUM SERPL-MCNC: 2.1 MG/DL — SIGNIFICANT CHANGE UP (ref 1.6–2.6)
MCHC RBC-ENTMCNC: 29.4 PG — SIGNIFICANT CHANGE UP (ref 27–34)
MCHC RBC-ENTMCNC: 30.7 GM/DL — LOW (ref 32–36)
MCV RBC AUTO: 95.7 FL — SIGNIFICANT CHANGE UP (ref 80–100)
PLATELET # BLD AUTO: 258 K/UL — SIGNIFICANT CHANGE UP (ref 150–400)
POTASSIUM SERPL-MCNC: 3.6 MMOL/L — SIGNIFICANT CHANGE UP (ref 3.5–5.3)
POTASSIUM SERPL-SCNC: 3.6 MMOL/L — SIGNIFICANT CHANGE UP (ref 3.5–5.3)
RBC # BLD: 3.49 M/UL — LOW (ref 3.8–5.2)
RBC # FLD: 14.3 % — SIGNIFICANT CHANGE UP (ref 10.3–14.5)
SODIUM SERPL-SCNC: 146 MMOL/L — HIGH (ref 135–145)
WBC # BLD: 6.3 K/UL — SIGNIFICANT CHANGE UP (ref 3.8–10.5)
WBC # FLD AUTO: 6.3 K/UL — SIGNIFICANT CHANGE UP (ref 3.8–10.5)

## 2018-01-23 PROCEDURE — 99233 SBSQ HOSP IP/OBS HIGH 50: CPT

## 2018-01-23 RX ORDER — ERTAPENEM SODIUM 1 G/1
1000 INJECTION, POWDER, LYOPHILIZED, FOR SOLUTION INTRAMUSCULAR; INTRAVENOUS ONCE
Qty: 0 | Refills: 0 | Status: COMPLETED | OUTPATIENT
Start: 2018-01-23 | End: 2018-01-23

## 2018-01-23 RX ORDER — ERTAPENEM SODIUM 1 G/1
1000 INJECTION, POWDER, LYOPHILIZED, FOR SOLUTION INTRAMUSCULAR; INTRAVENOUS EVERY 24 HOURS
Qty: 0 | Refills: 0 | Status: DISCONTINUED | OUTPATIENT
Start: 2018-01-24 | End: 2018-01-29

## 2018-01-23 RX ORDER — SENNA PLUS 8.6 MG/1
2 TABLET ORAL AT BEDTIME
Qty: 0 | Refills: 0 | Status: DISCONTINUED | OUTPATIENT
Start: 2018-01-23 | End: 2018-02-02

## 2018-01-23 RX ORDER — ERTAPENEM SODIUM 1 G/1
INJECTION, POWDER, LYOPHILIZED, FOR SOLUTION INTRAMUSCULAR; INTRAVENOUS
Qty: 0 | Refills: 0 | Status: DISCONTINUED | OUTPATIENT
Start: 2018-01-23 | End: 2018-01-29

## 2018-01-23 RX ADMIN — SODIUM CHLORIDE 75 MILLILITER(S): 9 INJECTION, SOLUTION INTRAVENOUS at 05:35

## 2018-01-23 RX ADMIN — ERTAPENEM SODIUM 120 MILLIGRAM(S): 1 INJECTION, POWDER, LYOPHILIZED, FOR SOLUTION INTRAMUSCULAR; INTRAVENOUS at 15:04

## 2018-01-23 RX ADMIN — CEFTRIAXONE 100 GRAM(S): 500 INJECTION, POWDER, FOR SOLUTION INTRAMUSCULAR; INTRAVENOUS at 02:22

## 2018-01-23 RX ADMIN — Medication 500 MILLIGRAM(S): at 12:28

## 2018-01-23 RX ADMIN — SENNA PLUS 2 TABLET(S): 8.6 TABLET ORAL at 22:06

## 2018-01-23 RX ADMIN — Medication 100 MILLIGRAM(S): at 17:55

## 2018-01-23 RX ADMIN — MIRTAZAPINE 15 MILLIGRAM(S): 45 TABLET, ORALLY DISINTEGRATING ORAL at 22:06

## 2018-01-23 RX ADMIN — ENOXAPARIN SODIUM 40 MILLIGRAM(S): 100 INJECTION SUBCUTANEOUS at 12:28

## 2018-01-23 RX ADMIN — Medication 37.5 MILLIGRAM(S): at 12:28

## 2018-01-23 RX ADMIN — POLYETHYLENE GLYCOL 3350 17 GRAM(S): 17 POWDER, FOR SOLUTION ORAL at 12:28

## 2018-01-23 RX ADMIN — SODIUM CHLORIDE 75 MILLILITER(S): 9 INJECTION, SOLUTION INTRAVENOUS at 12:27

## 2018-01-23 RX ADMIN — Medication 100 MILLIGRAM(S): at 05:35

## 2018-01-23 NOTE — PROGRESS NOTE ADULT - PROBLEM SELECTOR PLAN 3
- unable to meet caloric needs via PO, for PEG  - Seen by pall med, appreciate assistance, family wants trial of artificial nutrition in lieu of dementia.

## 2018-01-23 NOTE — PROGRESS NOTE ADULT - ATTENDING COMMENTS
Events noted Patient  is  non verbal  treatment  for UTI in progress  ESBL started  on Ertapenem ID evaluation reviewed    Failure to thrive  patient  family would  like  PEG placement  off Plavix  continue  to monitor    Dementia AT stable  Quadriplegia  supportive  care

## 2018-01-23 NOTE — PROGRESS NOTE ADULT - PROBLEM SELECTOR PLAN 3
2/2 advanced dementia. Patient nonambulatory, dependent on all ADLs. Patient from [facility]; requires 24 hr care.

## 2018-01-23 NOTE — PROGRESS NOTE ADULT - SUBJECTIVE AND OBJECTIVE BOX
OVERNIGHT EVENTS: no acute events    Present Symptoms:   Review of Systems:  Unable to obtain due to poor mentation    MEDICATIONS  (STANDING):  ascorbic acid 500 milliGRAM(s) Oral daily  dextrose 5% + sodium chloride 0.9%. 1000 milliLiter(s) (75 mL/Hr) IV Continuous <Continuous>  dextrose 5%. 1000 milliLiter(s) (50 mL/Hr) IV Continuous <Continuous>  dextrose 50% Injectable 12.5 Gram(s) IV Push once  dextrose 50% Injectable 25 Gram(s) IV Push once  dextrose 50% Injectable 25 Gram(s) IV Push once  docusate sodium 100 milliGRAM(s) Oral two times a day  enoxaparin Injectable 40 milliGRAM(s) SubCutaneous daily  ertapenem  IVPB      ertapenem  IVPB 1000 milliGRAM(s) IV Intermittent once  insulin lispro (HumaLOG) corrective regimen sliding scale   SubCutaneous three times a day before meals  mirtazapine 15 milliGRAM(s) Oral at bedtime  polyethylene glycol 3350 17 Gram(s) Oral daily  venlafaxine XR. 37.5 milliGRAM(s) Oral daily    MEDICATIONS  (PRN):  acetaminophen   Tablet 650 milliGRAM(s) Oral every 6 hours PRN mild pain 1-3  dextrose Gel 1 Dose(s) Oral once PRN Blood Glucose LESS THAN 70 milliGRAM(s)/deciliter  glucagon  Injectable 1 milliGRAM(s) IntraMuscular once PRN Glucose LESS THAN 70 milligrams/deciliter  sodium biphosphate Rectal Enema 1 Enema Rectal daily PRN constipation      PHYSICAL EXAM:  Vital Signs Last 24 Hrs  T(C): 36.4 (23 Jan 2018 05:07), Max: 37.4 (22 Jan 2018 14:24)  T(F): 97.6 (23 Jan 2018 05:07), Max: 99.4 (22 Jan 2018 14:24)  HR: 68 (23 Jan 2018 05:07) (68 - 76)  BP: 117/89 (23 Jan 2018 05:07) (96/64 - 117/89)  BP(mean): --  RR: 18 (23 Jan 2018 05:07) (16 - 18)  SpO2: 95% (23 Jan 2018 05:07) (95% - 97%)  General: alert , not oriented, nonverbal, unable to follow commands, in no acute distress  Karnofsky Performance Score/Palliative Performance Status Version2:    30%    HEENT: dry lips  Lungs: comfortable  CV: normal    GI:  incontinent  :  incontinent   Musculoskeletal: nonambulatory, dependent on ADLs, unable to follow commands, loss of muscle mass  Skin: normal    Neuro: cognitive impairment dysphagia  Oral intake ability: minimal	  Diet: pureed/honey    LABS:                          10.3   6.3   )-----------( 258      ( 23 Jan 2018 09:02 )             33.4     01-23    146<H>  |  113<H>  |  4<L>  ----------------------------<  74  3.6   |  26  |  0.41<L>    Ca    8.1<L>      23 Jan 2018 09:02  Phos  2.5     01-22  Mg     2.1     01-23    TPro  5.2<L>  /  Alb  1.9<L>  /  TBili  0.2  /  DBili  x   /  AST  19  /  ALT  8<L>  /  AlkPhos  110  01-22        RADIOLOGY & ADDITIONAL STUDIES: reviewed    ADVANCE DIRECTIVES: DNR / MOLST forms completed as per daughter's wishes

## 2018-01-23 NOTE — PROGRESS NOTE ADULT - PROBLEM SELECTOR PLAN 2
- gentle hydraion  - for PEG monday with dr Burks (plavix on hold as of today)  - hold lovenox 24hrs prior to proceudre

## 2018-01-23 NOTE — PROGRESS NOTE ADULT - PROBLEM SELECTOR PLAN 2
2/2 advanced dementia. Patient with poor PO intake; noted with loss of muscle mass. Albumin 1.9. Spoke with daughter - she wants to continue with PEG placement.

## 2018-01-23 NOTE — CONSULT NOTE ADULT - SUBJECTIVE AND OBJECTIVE BOX
HPI:  Patient is a 88F from [facility], alert, non-ambulatory, with PMH Alzheimer's dementia, HTN, constipation, anxiety, dysphagia, heart disease, major depressive d/o, presenting with poor po intake, hypernatremia and hypokalemia  along with  low BP in the NH. Patient was seen and examined, awake, does not follow commands, appears comfortable. Spoke to daughter Phil at bedside, who is HCP, states that she wants patient to be DNR and DNI, despite prior MOLST. ROS unobtainable from patient due to dementia. afebrile on adm with stable bp , u/a pos , cxr pos for RLL pna . ID called for eval of ESBL klebsiella uti           PAST MEDICAL & SURGICAL HISTORY:  Constipation  Depression  Hypertension  Dementia  s/p right hip replacement     allergy   No Known Allergies      acetaminophen   Tablet 650 milliGRAM(s) Oral every 6 hours PRN  ascorbic acid 500 milliGRAM(s) Oral daily  dextrose 5% + sodium chloride 0.9%. 1000 milliLiter(s) IV Continuous <Continuous>  dextrose 5%. 1000 milliLiter(s) IV Continuous <Continuous>  dextrose 50% Injectable 12.5 Gram(s) IV Push once  dextrose 50% Injectable 25 Gram(s) IV Push once  dextrose 50% Injectable 25 Gram(s) IV Push once  dextrose Gel 1 Dose(s) Oral once PRN  docusate sodium 100 milliGRAM(s) Oral two times a day  enoxaparin Injectable 40 milliGRAM(s) SubCutaneous daily  ertapenem  IVPB      glucagon  Injectable 1 milliGRAM(s) IntraMuscular once PRN  insulin lispro (HumaLOG) corrective regimen sliding scale   SubCutaneous three times a day before meals  mirtazapine 15 milliGRAM(s) Oral at bedtime  senna 2 Tablet(s) Oral at bedtime  sodium biphosphate Rectal Enema 1 Enema Rectal daily PRN  venlafaxine XR. 37.5 milliGRAM(s) Oral daily      Social Hx: no smoking no etoh     FAMILY HISTORY:  No pertinent family history in first degree relatives        ROS  [  x] UNABLE TO ELICIT    General:  [  ] Fever   [  ] Malaise    Skin:    HEENT:  [  ] Sore Throat  [  ] Photophobia	    Chest: [  ] SOB  [  ] Cough     Cardiovascular: [  ] CP	    Gastrointestinal: [  ] Abd pain   [  ] N    [  ] V   [  ] Diarrhea	    Genitourinary: [  ] Polyuria   [  ] Urgency   [  ] Dysuria    [  ]  Hematuria	    Musculoskeletal:  [  ] Back Pain	    Neurological: [  ]Dizziness  [  ]Visual Disturbance  [  ]Headaches      PHYSICAL EXAM:    Vital Signs Last 24 Hrs  T(C): 36.5 (23 Jan 2018 14:55), Max: 37.1 (22 Jan 2018 21:44)  T(F): 97.7 (23 Jan 2018 14:55), Max: 98.8 (22 Jan 2018 21:44)  HR: 84 (23 Jan 2018 14:55) (68 - 84)  BP: 97/75 (23 Jan 2018 14:55) (96/64 - 117/89)  BP(mean): --  RR: 17 (23 Jan 2018 14:55) (16 - 18)  SpO2: 100% (23 Jan 2018 14:55) (95% - 100%)    PHYSICAL EXAM:  GENERAL: NAD  HEENT: Normocephalic;  conjunctivae and sclerae clear; moist mucous membranes;   NECK : supple  CHEST/LUNG: Clear to auscultation bilaterally with good air entry   HEART: S1 S2  regular; no murmurs, gallops or rubs  ABDOMEN: Soft, Nontender, Nondistended; Bowel sounds present  EXTREMITIES: no cyanosis; no edema; no calf tenderness  SKIN: warm and dry; no rash  NERVOUS SYSTEM:  functional quad         LABS/DIAGNOSTIC TESTS                          10.3   6.3   )-----------( 258      ( 23 Jan 2018 09:02 )             33.4     WBC Count: 6.3 K/uL (01-23 @ 09:02)  WBC Count: 3.3 K/uL (01-22 @ 06:59)  WBC Count: 3.9 K/uL (01-21 @ 06:11)      01-23    146<H>  |  113<H>  |  4<L>  ----------------------------<  74  3.6   |  26  |  0.41<L>    Ca    8.1<L>      23 Jan 2018 09:02  Phos  2.5     01-22  Mg     2.1     01-23    TPro  5.2<L>  /  Alb  1.9<L>  /  TBili  0.2  /  DBili  x   /  AST  19  /  ALT  8<L>  /  AlkPhos  110  01-22          LIVER FUNCTIONS - ( 22 Jan 2018 06:59 )  Alb: 1.9 g/dL / Pro: 5.2 g/dL / ALK PHOS: 110 U/L / ALT: 8 U/L DA / AST: 19 U/L / GGT: x                 LACTATE: Lactate, Blood (01.19.18 @ 22:30)    Lactate, Blood: 1.4 mmol/L        Culture - Urine (01.20.18 @ 09:25)    -  Amikacin: S <=8    -  Ampicillin: R >16    -  Ampicillin/Sulbactam: R >16/8    -  Aztreonam: R >16    -  Cefazolin: R >16    -  Cefepime: R >16    -  Cefoxitin: S <=4    -  Ceftazidime: R >16    -  Ceftriaxone: R >32    -  Ciprofloxacin: R >2    -  Ertapenem: S <=0.5    -  Gentamicin: R >8    -  Imipenem: S <=1    -  Levofloxacin: R >4    -  Meropenem: S <=1    -  Nitrofurantoin: R >64    -  Piperacillin/Tazobactam: R 16    -  Tobramycin: R >8    -  Trimethoprim/Sulfamethoxazole: R >2/38    Specimen Source: .Urine Catheterized    Culture Results:   >100,000 CFU/ml Klebsiella pneumoniae ESBL    Organism Identification: Klebsiella pneumoniae ESBL    Organism: Klebsiella pneumoniae ESBL    Method Type: HALLE            RADIOLOGY  < from: Xray Chest 1 View AP- PORTABLE-Urgent (01.19.18 @ 21:57) >  EXAM:  XR CHEST PORTABLE URGENT 1V                            PROCEDURE DATE:  01/19/2018          INTERPRETATION:  Chest one view    HISTORY: Weakness and failure to thrive    COMPARISON STUDY: None available    Frontal expiratory view of the chest shows the heart to be normal in   size. The lungs show basilar atelectasis with possible right base   infiltrate versus small tumor and there is no evidence of pneumothorax   nor pleural effusion.    IMPRESSION:  Right base infiltrate versus small tumor. Follow-up study is recommended   as clinically warranted.    Thank you for the courtesy of this referral.    < end of copied text >

## 2018-01-23 NOTE — PROGRESS NOTE ADULT - SUBJECTIVE AND OBJECTIVE BOX
NP Note discussed with  Primary Attending    Patient is a 88y old  Female who presents with a chief complaint of failure to thrive (20 Jan 2018 03:27)      INTERVAL HPI/OVERNIGHT EVENTS: no new complaints    MEDICATIONS  (STANDING):  ascorbic acid 500 milliGRAM(s) Oral daily  dextrose 5% + sodium chloride 0.9%. 1000 milliLiter(s) (75 mL/Hr) IV Continuous <Continuous>  dextrose 5%. 1000 milliLiter(s) (50 mL/Hr) IV Continuous <Continuous>  dextrose 50% Injectable 12.5 Gram(s) IV Push once  dextrose 50% Injectable 25 Gram(s) IV Push once  dextrose 50% Injectable 25 Gram(s) IV Push once  docusate sodium 100 milliGRAM(s) Oral two times a day  enoxaparin Injectable 40 milliGRAM(s) SubCutaneous daily  ertapenem  IVPB      insulin lispro (HumaLOG) corrective regimen sliding scale   SubCutaneous three times a day before meals  mirtazapine 15 milliGRAM(s) Oral at bedtime  polyethylene glycol 3350 17 Gram(s) Oral daily  venlafaxine XR. 37.5 milliGRAM(s) Oral daily    MEDICATIONS  (PRN):  acetaminophen   Tablet 650 milliGRAM(s) Oral every 6 hours PRN mild pain 1-3  dextrose Gel 1 Dose(s) Oral once PRN Blood Glucose LESS THAN 70 milliGRAM(s)/deciliter  glucagon  Injectable 1 milliGRAM(s) IntraMuscular once PRN Glucose LESS THAN 70 milligrams/deciliter  sodium biphosphate Rectal Enema 1 Enema Rectal daily PRN constipation      __________________________________________________  REVIEW OF SYSTEMS:    CONSTITUTIONAL: No fever,   EYES: no acute visual disturbances  NECK: No pain or stiffness  RESPIRATORY: No cough; No shortness of breath  CARDIOVASCULAR: No chest pain, no palpitations  GASTROINTESTINAL: No pain. No nausea or vomiting; No diarrhea   NEUROLOGICAL: No headache or numbness, no tremors  MUSCULOSKELETAL: No joint pain, no muscle pain  GENITOURINARY: no dysuria, no frequency, no hesitancy  PSYCHIATRY: no depression , no anxiety  ALL OTHER  ROS negative        Vital Signs Last 24 Hrs  T(C): 36.5 (23 Jan 2018 14:55), Max: 37.1 (22 Jan 2018 21:44)  T(F): 97.7 (23 Jan 2018 14:55), Max: 98.8 (22 Jan 2018 21:44)  HR: 84 (23 Jan 2018 14:55) (68 - 84)  BP: 97/75 (23 Jan 2018 14:55) (96/64 - 117/89)  BP(mean): --  RR: 17 (23 Jan 2018 14:55) (16 - 18)  SpO2: 100% (23 Jan 2018 14:55) (95% - 100%)    ________________________________________________  PHYSICAL EXAM:  GENERAL: NAD  HEENT: Normocephalic;  conjunctivae and sclerae clear; moist mucous membranes;   NECK : supple  CHEST/LUNG: Clear to auscultation bilaterally with good air entry   HEART: S1 S2  regular; no murmurs, gallops or rubs  ABDOMEN: Soft, Nontender, Nondistended; Bowel sounds present  EXTREMITIES: no cyanosis; no edema; no calf tenderness  SKIN: warm and dry; no rash  NERVOUS SYSTEM:  Awake and alert; Oriented  to place, person and time ; no new deficits    _________________________________________________  LABS:                        10.3   6.3   )-----------( 258      ( 23 Jan 2018 09:02 )             33.4     01-23    146<H>  |  113<H>  |  4<L>  ----------------------------<  74  3.6   |  26  |  0.41<L>    Ca    8.1<L>      23 Jan 2018 09:02  Phos  2.5     01-22  Mg     2.1     01-23    TPro  5.2<L>  /  Alb  1.9<L>  /  TBili  0.2  /  DBili  x   /  AST  19  /  ALT  8<L>  /  AlkPhos  110  01-22        CAPILLARY BLOOD GLUCOSE      POCT Blood Glucose.: 100 mg/dL (23 Jan 2018 16:36)  POCT Blood Glucose.: 74 mg/dL (23 Jan 2018 12:13)  POCT Blood Glucose.: 91 mg/dL (23 Jan 2018 08:36)  POCT Blood Glucose.: 119 mg/dL (22 Jan 2018 21:27)  POCT Blood Glucose.: 85 mg/dL (22 Jan 2018 17:03)        RADIOLOGY & ADDITIONAL TESTS:    Imaging  Reviewed:  YES/NO    Consultant(s) Notes Reviewed:   YES/ No      Plan of care was discussed with patient and /or primary care giver; all questions and concerns were addressed NP Note discussed with  Primary Attending    Patient is a 88y old  Female who presents with a chief complaint of failure to thrive (20 Jan 2018 03:27)      INTERVAL HPI/OVERNIGHT EVENTS: no new complaints. Noted with new ESBL. pending peg    MEDICATIONS  (STANDING):  ascorbic acid 500 milliGRAM(s) Oral daily  dextrose 5% + sodium chloride 0.9%. 1000 milliLiter(s) (75 mL/Hr) IV Continuous <Continuous>  dextrose 5%. 1000 milliLiter(s) (50 mL/Hr) IV Continuous <Continuous>  dextrose 50% Injectable 12.5 Gram(s) IV Push once  dextrose 50% Injectable 25 Gram(s) IV Push once  dextrose 50% Injectable 25 Gram(s) IV Push once  docusate sodium 100 milliGRAM(s) Oral two times a day  enoxaparin Injectable 40 milliGRAM(s) SubCutaneous daily  ertapenem  IVPB      insulin lispro (HumaLOG) corrective regimen sliding scale   SubCutaneous three times a day before meals  mirtazapine 15 milliGRAM(s) Oral at bedtime  polyethylene glycol 3350 17 Gram(s) Oral daily  venlafaxine XR. 37.5 milliGRAM(s) Oral daily    MEDICATIONS  (PRN):  acetaminophen   Tablet 650 milliGRAM(s) Oral every 6 hours PRN mild pain 1-3  dextrose Gel 1 Dose(s) Oral once PRN Blood Glucose LESS THAN 70 milliGRAM(s)/deciliter  glucagon  Injectable 1 milliGRAM(s) IntraMuscular once PRN Glucose LESS THAN 70 milligrams/deciliter  sodium biphosphate Rectal Enema 1 Enema Rectal daily PRN constipation      __________________________________________________  REVIEW OF SYSTEMS:    CONSTITUTIONAL: No fever,   EYES: no acute visual disturbances  NECK: No pain or stiffness  RESPIRATORY: No cough; No shortness of breath  CARDIOVASCULAR: No chest pain, no palpitations  GASTROINTESTINAL: No pain. No nausea or vomiting; No diarrhea   NEUROLOGICAL: No headache or numbness, no tremors  MUSCULOSKELETAL: No joint pain, no muscle pain  GENITOURINARY: no dysuria, no frequency, no hesitancy  PSYCHIATRY: no depression , no anxiety  ALL OTHER  ROS negative        Vital Signs Last 24 Hrs  T(C): 36.5 (23 Jan 2018 14:55), Max: 37.1 (22 Jan 2018 21:44)  T(F): 97.7 (23 Jan 2018 14:55), Max: 98.8 (22 Jan 2018 21:44)  HR: 84 (23 Jan 2018 14:55) (68 - 84)  BP: 97/75 (23 Jan 2018 14:55) (96/64 - 117/89)  BP(mean): --  RR: 17 (23 Jan 2018 14:55) (16 - 18)  SpO2: 100% (23 Jan 2018 14:55) (95% - 100%)    ________________________________________________  PHYSICAL EXAM:  GENERAL: NAD  HEENT: Normocephalic;  conjunctivae and sclerae clear; moist mucous membranes;   NECK : supple  CHEST/LUNG: Clear to auscultation bilaterally with good air entry   HEART: S1 S2  regular; no murmurs, gallops or rubs  ABDOMEN: Soft, Nontender, Nondistended; Bowel sounds present  EXTREMITIES: no cyanosis; no edema; no calf tenderness  SKIN: warm and dry; no rash  NERVOUS SYSTEM:  Awake and alert; Oriented  to place, person and time ; no new deficits    _________________________________________________  LABS:                        10.3   6.3   )-----------( 258      ( 23 Jan 2018 09:02 )             33.4     01-23    146<H>  |  113<H>  |  4<L>  ----------------------------<  74  3.6   |  26  |  0.41<L>    Ca    8.1<L>      23 Jan 2018 09:02  Phos  2.5     01-22  Mg     2.1     01-23    TPro  5.2<L>  /  Alb  1.9<L>  /  TBili  0.2  /  DBili  x   /  AST  19  /  ALT  8<L>  /  AlkPhos  110  01-22        CAPILLARY BLOOD GLUCOSE      POCT Blood Glucose.: 100 mg/dL (23 Jan 2018 16:36)  POCT Blood Glucose.: 74 mg/dL (23 Jan 2018 12:13)  POCT Blood Glucose.: 91 mg/dL (23 Jan 2018 08:36)  POCT Blood Glucose.: 119 mg/dL (22 Jan 2018 21:27)  POCT Blood Glucose.: 85 mg/dL (22 Jan 2018 17:03)        RADIOLOGY & ADDITIONAL TESTS:    Imaging  Reviewed:  YES    Consultant(s) Notes Reviewed:   YES      Plan of care was discussed with patient and /or primary care giver; all questions and concerns were addressed

## 2018-01-24 LAB
ANION GAP SERPL CALC-SCNC: 6 MMOL/L — SIGNIFICANT CHANGE UP (ref 5–17)
BUN SERPL-MCNC: 5 MG/DL — LOW (ref 7–18)
CALCIUM SERPL-MCNC: 8.4 MG/DL — SIGNIFICANT CHANGE UP (ref 8.4–10.5)
CHLORIDE SERPL-SCNC: 113 MMOL/L — HIGH (ref 96–108)
CO2 SERPL-SCNC: 26 MMOL/L — SIGNIFICANT CHANGE UP (ref 22–31)
CREAT SERPL-MCNC: 0.53 MG/DL — SIGNIFICANT CHANGE UP (ref 0.5–1.3)
GLUCOSE BLDC GLUCOMTR-MCNC: 111 MG/DL — HIGH (ref 70–99)
GLUCOSE BLDC GLUCOMTR-MCNC: 119 MG/DL — HIGH (ref 70–99)
GLUCOSE BLDC GLUCOMTR-MCNC: 150 MG/DL — HIGH (ref 70–99)
GLUCOSE BLDC GLUCOMTR-MCNC: 72 MG/DL — SIGNIFICANT CHANGE UP (ref 70–99)
GLUCOSE SERPL-MCNC: 81 MG/DL — SIGNIFICANT CHANGE UP (ref 70–99)
HCT VFR BLD CALC: 33.1 % — LOW (ref 34.5–45)
HGB BLD-MCNC: 10.7 G/DL — LOW (ref 11.5–15.5)
MAGNESIUM SERPL-MCNC: 2.2 MG/DL — SIGNIFICANT CHANGE UP (ref 1.6–2.6)
MCHC RBC-ENTMCNC: 31.1 PG — SIGNIFICANT CHANGE UP (ref 27–34)
MCHC RBC-ENTMCNC: 32.2 GM/DL — SIGNIFICANT CHANGE UP (ref 32–36)
MCV RBC AUTO: 96.5 FL — SIGNIFICANT CHANGE UP (ref 80–100)
PLATELET # BLD AUTO: 248 K/UL — SIGNIFICANT CHANGE UP (ref 150–400)
POTASSIUM SERPL-MCNC: 3.5 MMOL/L — SIGNIFICANT CHANGE UP (ref 3.5–5.3)
POTASSIUM SERPL-SCNC: 3.5 MMOL/L — SIGNIFICANT CHANGE UP (ref 3.5–5.3)
RBC # BLD: 3.43 M/UL — LOW (ref 3.8–5.2)
RBC # FLD: 14.5 % — SIGNIFICANT CHANGE UP (ref 10.3–14.5)
SODIUM SERPL-SCNC: 145 MMOL/L — SIGNIFICANT CHANGE UP (ref 135–145)
WBC # BLD: 5.6 K/UL — SIGNIFICANT CHANGE UP (ref 3.8–10.5)
WBC # FLD AUTO: 5.6 K/UL — SIGNIFICANT CHANGE UP (ref 3.8–10.5)

## 2018-01-24 RX ORDER — POTASSIUM CHLORIDE 20 MEQ
40 PACKET (EA) ORAL ONCE
Qty: 0 | Refills: 0 | Status: COMPLETED | OUTPATIENT
Start: 2018-01-24 | End: 2018-01-24

## 2018-01-24 RX ORDER — ENOXAPARIN SODIUM 100 MG/ML
40 INJECTION SUBCUTANEOUS DAILY
Qty: 0 | Refills: 0 | Status: COMPLETED | OUTPATIENT
Start: 2018-01-24 | End: 2018-01-27

## 2018-01-24 RX ADMIN — SODIUM CHLORIDE 75 MILLILITER(S): 9 INJECTION, SOLUTION INTRAVENOUS at 21:33

## 2018-01-24 RX ADMIN — ENOXAPARIN SODIUM 40 MILLIGRAM(S): 100 INJECTION SUBCUTANEOUS at 12:20

## 2018-01-24 RX ADMIN — SODIUM CHLORIDE 75 MILLILITER(S): 9 INJECTION, SOLUTION INTRAVENOUS at 06:33

## 2018-01-24 RX ADMIN — Medication 40 MILLIEQUIVALENT(S): at 12:18

## 2018-01-24 RX ADMIN — MIRTAZAPINE 15 MILLIGRAM(S): 45 TABLET, ORALLY DISINTEGRATING ORAL at 21:33

## 2018-01-24 RX ADMIN — Medication 100 MILLIGRAM(S): at 17:57

## 2018-01-24 RX ADMIN — ERTAPENEM SODIUM 120 MILLIGRAM(S): 1 INJECTION, POWDER, LYOPHILIZED, FOR SOLUTION INTRAMUSCULAR; INTRAVENOUS at 12:18

## 2018-01-24 RX ADMIN — SENNA PLUS 2 TABLET(S): 8.6 TABLET ORAL at 21:33

## 2018-01-24 RX ADMIN — Medication 37.5 MILLIGRAM(S): at 12:19

## 2018-01-24 RX ADMIN — Medication 500 MILLIGRAM(S): at 12:20

## 2018-01-24 RX ADMIN — Medication 100 MILLIGRAM(S): at 06:33

## 2018-01-24 NOTE — PROGRESS NOTE ADULT - SUBJECTIVE AND OBJECTIVE BOX
NP Note discussed with  Primary Attending    Patient is a 88y old  Female who presents with a chief complaint of failure to thrive (20 Jan 2018 03:27)    Patient is a 88F from [facility], alert, non-ambulatory, with PMH Alzheimer's dementia, HTN, constipation, anxiety, dysphagia, heart disease, major depressive d/o, presenting with poor po intake and low BP in the NH. Admitted for UTI, dehydration and failure to thrive.      INTERVAL HPI/OVERNIGHT EVENTS: no new complaints, unable to perform ROS 2/2 dementia. Appears comfortable.    MEDICATIONS  (STANDING):  ascorbic acid 500 milliGRAM(s) Oral daily  dextrose 5% + sodium chloride 0.9%. 1000 milliLiter(s) (75 mL/Hr) IV Continuous <Continuous>  dextrose 5%. 1000 milliLiter(s) (50 mL/Hr) IV Continuous <Continuous>  dextrose 50% Injectable 12.5 Gram(s) IV Push once  dextrose 50% Injectable 25 Gram(s) IV Push once  dextrose 50% Injectable 25 Gram(s) IV Push once  docusate sodium 100 milliGRAM(s) Oral two times a day  enoxaparin Injectable 40 milliGRAM(s) SubCutaneous daily  ertapenem  IVPB      ertapenem  IVPB 1000 milliGRAM(s) IV Intermittent every 24 hours  insulin lispro (HumaLOG) corrective regimen sliding scale   SubCutaneous three times a day before meals  mirtazapine 15 milliGRAM(s) Oral at bedtime  senna 2 Tablet(s) Oral at bedtime  venlafaxine XR. 37.5 milliGRAM(s) Oral daily    MEDICATIONS  (PRN):  acetaminophen   Tablet 650 milliGRAM(s) Oral every 6 hours PRN mild pain 1-3  dextrose Gel 1 Dose(s) Oral once PRN Blood Glucose LESS THAN 70 milliGRAM(s)/deciliter  glucagon  Injectable 1 milliGRAM(s) IntraMuscular once PRN Glucose LESS THAN 70 milligrams/deciliter  sodium biphosphate Rectal Enema 1 Enema Rectal daily PRN constipation      __________________________________________________  REVIEW OF SYSTEMS:    CONSTITUTIONAL: No fever,         Vital Signs Last 24 Hrs  T(C): 36.7 (24 Jan 2018 05:39), Max: 37 (23 Jan 2018 21:01)  T(F): 98 (24 Jan 2018 05:39), Max: 98.6 (23 Jan 2018 21:01)  HR: 71 (24 Jan 2018 05:39) (71 - 84)  BP: 121/47 (24 Jan 2018 05:39) (97/75 - 121/47)  BP(mean): --  RR: 16 (24 Jan 2018 05:39) (16 - 17)  SpO2: 95% (24 Jan 2018 05:39) (95% - 100%)    ________________________________________________  PHYSICAL EXAM:  GENERAL: NAD  HEENT: Normocephalic;  conjunctivae and sclerae clear; moist mucous membranes;   NECK : supple  CHEST/LUNG: Clear to auscultation bilaterally with good air entry   HEART: S1 S2  regular; no murmurs, gallops or rubs  ABDOMEN: Soft, Nontender, Nondistended; Bowel sounds present  EXTREMITIES: no cyanosis; no edema; no calf tenderness  SKIN: warm and dry; no rash  NERVOUS SYSTEM:  Awake and alert;confused, no focal deficit.  _________________________________________________  LABS:                        10.7   5.6   )-----------( 248      ( 24 Jan 2018 06:59 )             33.1     01-24    145  |  113<H>  |  5<L>  ----------------------------<  81  3.5   |  26  |  0.53    Ca    8.4      24 Jan 2018 06:59  Mg     2.2     01-24          CAPILLARY BLOOD GLUCOSE      POCT Blood Glucose.: 150 mg/dL (24 Jan 2018 11:40)  POCT Blood Glucose.: 72 mg/dL (24 Jan 2018 07:50)  POCT Blood Glucose.: 115 mg/dL (23 Jan 2018 21:22)  POCT Blood Glucose.: 100 mg/dL (23 Jan 2018 16:36)        RADIOLOGY & ADDITIONAL TESTS:    Imaging  Reviewed:  YES    Consultant(s) Notes Reviewed:   YES      Plan of care was discussed with patient and /or primary care giver; all questions and concerns were addressed

## 2018-01-24 NOTE — PROGRESS NOTE ADULT - PROBLEM SELECTOR PLAN 1
- c/w INvanz D#2  - dr. Rogers consulted, appreciated  - no leukocytosis - c/w INvanz D#2  - dr. Rogers consulted, appreciated  - no leukocytosis  - contact precautions

## 2018-01-24 NOTE — PROGRESS NOTE ADULT - ATTENDING COMMENTS
Events noted Patient  is  non verbal  awake  but  responsive  Patient  daughter  at bedside  treatment  for UTI in progress  ESBL started  on Ertapenem   Failure to thrive  patient  family would  like  PEG placement  off Plavix  continue  to monitor    Dementia AT stable  Quadriplegia  supportive  care

## 2018-01-24 NOTE — PROGRESS NOTE ADULT - PROBLEM SELECTOR PLAN 2
- gentle hydraion  - for PEG monday with dr Burks (plavix on hold as of 1/23)  - hold lovenox 24hrs prior to proceudre - gentle hydraion  - for PEG monday with dr Burks (plavix on hold as of 1/23)  - hold lovenox 24hrs prior to procedure

## 2018-01-25 DIAGNOSIS — E87.6 HYPOKALEMIA: ICD-10-CM

## 2018-01-25 DIAGNOSIS — E83.39 OTHER DISORDERS OF PHOSPHORUS METABOLISM: ICD-10-CM

## 2018-01-25 LAB
ANION GAP SERPL CALC-SCNC: 9 MMOL/L — SIGNIFICANT CHANGE UP (ref 5–17)
BUN SERPL-MCNC: 4 MG/DL — LOW (ref 7–18)
CALCIUM SERPL-MCNC: 8.1 MG/DL — LOW (ref 8.4–10.5)
CHLORIDE SERPL-SCNC: 114 MMOL/L — HIGH (ref 96–108)
CO2 SERPL-SCNC: 23 MMOL/L — SIGNIFICANT CHANGE UP (ref 22–31)
CREAT SERPL-MCNC: 0.49 MG/DL — LOW (ref 0.5–1.3)
CULTURE RESULTS: SIGNIFICANT CHANGE UP
CULTURE RESULTS: SIGNIFICANT CHANGE UP
GLUCOSE BLDC GLUCOMTR-MCNC: 114 MG/DL — HIGH (ref 70–99)
GLUCOSE BLDC GLUCOMTR-MCNC: 89 MG/DL — SIGNIFICANT CHANGE UP (ref 70–99)
GLUCOSE BLDC GLUCOMTR-MCNC: 90 MG/DL — SIGNIFICANT CHANGE UP (ref 70–99)
GLUCOSE BLDC GLUCOMTR-MCNC: 92 MG/DL — SIGNIFICANT CHANGE UP (ref 70–99)
GLUCOSE BLDC GLUCOMTR-MCNC: 93 MG/DL — SIGNIFICANT CHANGE UP (ref 70–99)
GLUCOSE SERPL-MCNC: 81 MG/DL — SIGNIFICANT CHANGE UP (ref 70–99)
HCT VFR BLD CALC: 31.5 % — LOW (ref 34.5–45)
HGB BLD-MCNC: 10.2 G/DL — LOW (ref 11.5–15.5)
MAGNESIUM SERPL-MCNC: 2 MG/DL — SIGNIFICANT CHANGE UP (ref 1.6–2.6)
MCHC RBC-ENTMCNC: 31.3 PG — SIGNIFICANT CHANGE UP (ref 27–34)
MCHC RBC-ENTMCNC: 32.4 GM/DL — SIGNIFICANT CHANGE UP (ref 32–36)
MCV RBC AUTO: 96.6 FL — SIGNIFICANT CHANGE UP (ref 80–100)
PHOSPHATE SERPL-MCNC: 2.4 MG/DL — LOW (ref 2.5–4.5)
PLATELET # BLD AUTO: 234 K/UL — SIGNIFICANT CHANGE UP (ref 150–400)
POTASSIUM SERPL-MCNC: 3.4 MMOL/L — LOW (ref 3.5–5.3)
POTASSIUM SERPL-SCNC: 3.4 MMOL/L — LOW (ref 3.5–5.3)
RBC # BLD: 3.26 M/UL — LOW (ref 3.8–5.2)
RBC # FLD: 14.1 % — SIGNIFICANT CHANGE UP (ref 10.3–14.5)
SODIUM SERPL-SCNC: 146 MMOL/L — HIGH (ref 135–145)
SPECIMEN SOURCE: SIGNIFICANT CHANGE UP
SPECIMEN SOURCE: SIGNIFICANT CHANGE UP
WBC # BLD: 4.7 K/UL — SIGNIFICANT CHANGE UP (ref 3.8–10.5)
WBC # FLD AUTO: 4.7 K/UL — SIGNIFICANT CHANGE UP (ref 3.8–10.5)

## 2018-01-25 RX ORDER — POTASSIUM PHOSPHATE, MONOBASIC POTASSIUM PHOSPHATE, DIBASIC 236; 224 MG/ML; MG/ML
15 INJECTION, SOLUTION INTRAVENOUS ONCE
Qty: 0 | Refills: 0 | Status: COMPLETED | OUTPATIENT
Start: 2018-01-25 | End: 2018-01-25

## 2018-01-25 RX ORDER — SODIUM CHLORIDE 9 MG/ML
1000 INJECTION, SOLUTION INTRAVENOUS
Qty: 0 | Refills: 0 | Status: DISCONTINUED | OUTPATIENT
Start: 2018-01-25 | End: 2018-02-02

## 2018-01-25 RX ORDER — POTASSIUM CHLORIDE 20 MEQ
40 PACKET (EA) ORAL ONCE
Qty: 0 | Refills: 0 | Status: COMPLETED | OUTPATIENT
Start: 2018-01-25 | End: 2018-01-25

## 2018-01-25 RX ORDER — SODIUM CHLORIDE 9 MG/ML
1000 INJECTION, SOLUTION INTRAVENOUS
Qty: 0 | Refills: 0 | Status: DISCONTINUED | OUTPATIENT
Start: 2018-01-25 | End: 2018-01-25

## 2018-01-25 RX ADMIN — Medication 100 MILLIGRAM(S): at 05:38

## 2018-01-25 RX ADMIN — POTASSIUM PHOSPHATE, MONOBASIC POTASSIUM PHOSPHATE, DIBASIC 62.5 MILLIMOLE(S): 236; 224 INJECTION, SOLUTION INTRAVENOUS at 11:30

## 2018-01-25 RX ADMIN — ERTAPENEM SODIUM 120 MILLIGRAM(S): 1 INJECTION, POWDER, LYOPHILIZED, FOR SOLUTION INTRAMUSCULAR; INTRAVENOUS at 11:30

## 2018-01-25 RX ADMIN — SENNA PLUS 2 TABLET(S): 8.6 TABLET ORAL at 22:05

## 2018-01-25 RX ADMIN — MIRTAZAPINE 15 MILLIGRAM(S): 45 TABLET, ORALLY DISINTEGRATING ORAL at 22:05

## 2018-01-25 RX ADMIN — Medication 500 MILLIGRAM(S): at 11:31

## 2018-01-25 RX ADMIN — Medication 37.5 MILLIGRAM(S): at 11:31

## 2018-01-25 RX ADMIN — SODIUM CHLORIDE 75 MILLILITER(S): 9 INJECTION, SOLUTION INTRAVENOUS at 11:33

## 2018-01-25 RX ADMIN — Medication 100 MILLIGRAM(S): at 17:53

## 2018-01-25 RX ADMIN — Medication 40 MILLIEQUIVALENT(S): at 11:31

## 2018-01-25 RX ADMIN — ENOXAPARIN SODIUM 40 MILLIGRAM(S): 100 INJECTION SUBCUTANEOUS at 11:31

## 2018-01-25 NOTE — PROGRESS NOTE ADULT - SUBJECTIVE AND OBJECTIVE BOX
NP Note discussed with  Primary Attending    Patient is a 88y old  Female who presents with a chief complaint of failure to thrive (20 Jan 2018 03:27)      INTERVAL HPI/OVERNIGHT EVENTS: no new complaints. Still confused. appears comfortable. +bm. Unable to complete ROS    MEDICATIONS  (STANDING):  ascorbic acid 500 milliGRAM(s) Oral daily  dextrose 5% 1000 milliLiter(s) (75 mL/Hr) IV Continuous <Continuous>  dextrose 5%. 1000 milliLiter(s) (50 mL/Hr) IV Continuous <Continuous>  dextrose 50% Injectable 12.5 Gram(s) IV Push once  dextrose 50% Injectable 25 Gram(s) IV Push once  dextrose 50% Injectable 25 Gram(s) IV Push once  docusate sodium 100 milliGRAM(s) Oral two times a day  enoxaparin Injectable 40 milliGRAM(s) SubCutaneous daily  ertapenem  IVPB      ertapenem  IVPB 1000 milliGRAM(s) IV Intermittent every 24 hours  insulin lispro (HumaLOG) corrective regimen sliding scale   SubCutaneous three times a day before meals  mirtazapine 15 milliGRAM(s) Oral at bedtime  senna 2 Tablet(s) Oral at bedtime  venlafaxine XR. 37.5 milliGRAM(s) Oral daily    MEDICATIONS  (PRN):  acetaminophen   Tablet 650 milliGRAM(s) Oral every 6 hours PRN mild pain 1-3  dextrose Gel 1 Dose(s) Oral once PRN Blood Glucose LESS THAN 70 milliGRAM(s)/deciliter  glucagon  Injectable 1 milliGRAM(s) IntraMuscular once PRN Glucose LESS THAN 70 milligrams/deciliter  sodium biphosphate Rectal Enema 1 Enema Rectal daily PRN constipation      __________________________________________________  REVIEW OF SYSTEMS:    CONSTITUTIONAL: No fever,   EYES: no acute visual disturbances  NECK: No pain or stiffness  RESPIRATORY: No cough; No shortness of breath  CARDIOVASCULAR: No chest pain, no palpitations  GASTROINTESTINAL: No pain. No nausea or vomiting; No diarrhea   NEUROLOGICAL: No headache or numbness, no tremors  MUSCULOSKELETAL: No joint pain, no muscle pain  GENITOURINARY: no dysuria, no frequency, no hesitancy  PSYCHIATRY: no depression , no anxiety  ALL OTHER  ROS negative        Vital Signs Last 24 Hrs  T(C): 36.9 (25 Jan 2018 05:23), Max: 37.2 (24 Jan 2018 21:54)  T(F): 98.4 (25 Jan 2018 05:23), Max: 98.9 (24 Jan 2018 21:54)  HR: 66 (25 Jan 2018 05:23) (66 - 78)  BP: 108/55 (25 Jan 2018 05:23) (105/44 - 119/50)  BP(mean): 66 (25 Jan 2018 05:23) (66 - 66)  RR: 16 (25 Jan 2018 05:23) (14 - 18)  SpO2: 96% (25 Jan 2018 05:23) (94% - 100%)    ________________________________________________  PHYSICAL EXAM:  GENERAL: NAD  HEENT: Normocephalic;  conjunctivae and sclerae clear; moist mucous membranes;   NECK : supple  CHEST/LUNG: Clear to auscultation bilaterally with good air entry   HEART: S1 S2  regular; no murmurs, gallops or rubs  ABDOMEN: Soft, Nontender, Nondistended; Bowel sounds present  EXTREMITIES: no cyanosis; no edema; no calf tenderness  SKIN: warm and dry; no rash  NERVOUS SYSTEM:  Awake and alert; Oriented  to place, person and time ; no new deficits    _________________________________________________  LABS:                        10.2   4.7   )-----------( 234      ( 25 Jan 2018 06:22 )             31.5     01-25    146<H>  |  114<H>  |  4<L>  ----------------------------<  81  3.4<L>   |  23  |  0.49<L>    Ca    8.1<L>      25 Jan 2018 06:22  Phos  2.4     01-25  Mg     2.0     01-25          CAPILLARY BLOOD GLUCOSE      POCT Blood Glucose.: 90 mg/dL (25 Jan 2018 11:14)  POCT Blood Glucose.: 93 mg/dL (25 Jan 2018 07:31)  POCT Blood Glucose.: 119 mg/dL (24 Jan 2018 22:18)  POCT Blood Glucose.: 111 mg/dL (24 Jan 2018 16:46)        RADIOLOGY & ADDITIONAL TESTS:    Imaging  Reviewed:  YES    Consultant(s) Notes Reviewed:   YES      Plan of care was discussed called emergency contact - left message; all questions and concerns were addressed

## 2018-01-25 NOTE — PROGRESS NOTE ADULT - SUBJECTIVE AND OBJECTIVE BOX
Subjective: lying in bed, confused nad . no fevers . for peg on monday       PHYSICAL EXAM:    Vital Signs Last 24 Hrs  T(C): 36.8 (25 Jan 2018 13:40), Max: 37.2 (24 Jan 2018 21:54)  T(F): 98.2 (25 Jan 2018 13:40), Max: 98.9 (24 Jan 2018 21:54)  HR: 84 (25 Jan 2018 13:40) (66 - 84)  BP: 107/88 (25 Jan 2018 13:40) (105/44 - 108/55)  BP(mean): 66 (25 Jan 2018 05:23) (66 - 66)  RR: 16 (25 Jan 2018 13:40) (14 - 16)  SpO2: 99% (25 Jan 2018 13:40) (94% - 99%)    PHYSICAL EXAM:  GENERAL: NAD, non verbal  CHEST/LUNG: Clear to auscultation bilaterally with good air entry   HEART: S1 S2  regular; no murmurs, gallops or rubs  ABDOMEN: Soft, Nontender, Nondistended; Bowel sounds present  EXTREMITIES: no cyanosis; no edema; no calf tenderness  SKIN: warm and dry; no rash  NERVOUS SYSTEM:  functional quad         LABS/DIAGNOSTIC TESTS                        10.2   4.7   )-----------( 234      ( 25 Jan 2018 06:22 )             31.5     01-25    146<H>  |  114<H>  |  4<L>  ----------------------------<  81  3.4<L>   |  23  |  0.49<L>    Ca    8.1<L>      25 Jan 2018 06:22  Phos  2.4     01-25  Mg     2.0     01-25            meds  acetaminophen   Tablet 650 milliGRAM(s) Oral every 6 hours PRN  ascorbic acid 500 milliGRAM(s) Oral daily  dextrose 5% 1000 milliLiter(s) IV Continuous <Continuous>  dextrose 5%. 1000 milliLiter(s) IV Continuous <Continuous>  dextrose 50% Injectable 12.5 Gram(s) IV Push once  dextrose 50% Injectable 25 Gram(s) IV Push once  dextrose 50% Injectable 25 Gram(s) IV Push once  dextrose Gel 1 Dose(s) Oral once PRN  docusate sodium 100 milliGRAM(s) Oral two times a day  enoxaparin Injectable 40 milliGRAM(s) SubCutaneous daily  ertapenem  IVPB      ertapenem  IVPB 1000 milliGRAM(s) IV Intermittent every 24 hours  glucagon  Injectable 1 milliGRAM(s) IntraMuscular once PRN  insulin lispro (HumaLOG) corrective regimen sliding scale   SubCutaneous three times a day before meals  mirtazapine 15 milliGRAM(s) Oral at bedtime  senna 2 Tablet(s) Oral at bedtime  sodium biphosphate Rectal Enema 1 Enema Rectal daily PRN  venlafaxine XR. 37.5 milliGRAM(s) Oral daily        CULTURES  Culture Results: urine   >100,000 CFU/ml Klebsiella pneumoniae ESBL (01-20 @ 09:25)  Culture Results: blood  No growth at 5 days. (01-20 @ 09:22)  Culture Results: blood  No growth at 5 days. (01-20 @ 09:22)        RADIOLOGY  < from: Xray Chest 1 View AP- PORTABLE-Urgent (01.19.18 @ 21:57) >  EXAM:  XR CHEST PORTABLE URGENT 1V                            PROCEDURE DATE:  01/19/2018          INTERPRETATION:  Chest one view    HISTORY: Weakness and failure to thrive    COMPARISON STUDY: None available    Frontal expiratory view of the chest shows the heart to be normal in   size. The lungs show basilar atelectasis with possible right base   infiltrate versus small tumor and there is no evidence of pneumothorax   nor pleural effusion.    IMPRESSION:  Right base infiltrate versus small tumor. Follow-up study is recommended   as clinically warranted.    Thank you for the courtesy of this referral.    < end of copied text >

## 2018-01-25 NOTE — PROGRESS NOTE ADULT - PROBLEM SELECTOR PLAN 1
- Ucx noted, dc rocephin started INvanz D#1  - dr. Rogers consulted to see  - no leukocytosis - Ucx noted, dc rocephin started INvanz D#3  - dr. Rogers consulted to see  - no leukocytosis

## 2018-01-25 NOTE — PROGRESS NOTE ADULT - ATTENDING COMMENTS
Events noted Patient  is  non verbal  awake  but  responsive  Patient  daughter  at bedside  treatment  for UTI in progress  ESBL started  on Ertapenem CXR possible  RLL Pneumonia  Failure to thrive  patient  family would  like  PEG placement  off Plavix  continue  to monitor  Peg placement  on Monday as per GI   Dementia AT stable  Quadriplegia  supportive  care

## 2018-01-25 NOTE — PROGRESS NOTE ADULT - PROBLEM SELECTOR PLAN 2
- gentle hydraion  - for PEG monday with dr Burks (plavix on hold as of today)  - hold lovenox 24hrs prior to proceudre - 2/2 dementia late onset Alzheimer's   will continue with gentle hydration, dysphagia diet,   - for PEG monday with dr Burks (plavix on hold as of today)  - hold lovenox 24hrs prior to procedure

## 2018-01-26 LAB
ANION GAP SERPL CALC-SCNC: 8 MMOL/L — SIGNIFICANT CHANGE UP (ref 5–17)
BUN SERPL-MCNC: 4 MG/DL — LOW (ref 7–18)
CALCIUM SERPL-MCNC: 8.4 MG/DL — SIGNIFICANT CHANGE UP (ref 8.4–10.5)
CHLORIDE SERPL-SCNC: 110 MMOL/L — HIGH (ref 96–108)
CO2 SERPL-SCNC: 26 MMOL/L — SIGNIFICANT CHANGE UP (ref 22–31)
CREAT SERPL-MCNC: 0.48 MG/DL — LOW (ref 0.5–1.3)
GLUCOSE BLDC GLUCOMTR-MCNC: 76 MG/DL — SIGNIFICANT CHANGE UP (ref 70–99)
GLUCOSE BLDC GLUCOMTR-MCNC: 77 MG/DL — SIGNIFICANT CHANGE UP (ref 70–99)
GLUCOSE BLDC GLUCOMTR-MCNC: 87 MG/DL — SIGNIFICANT CHANGE UP (ref 70–99)
GLUCOSE BLDC GLUCOMTR-MCNC: 88 MG/DL — SIGNIFICANT CHANGE UP (ref 70–99)
GLUCOSE SERPL-MCNC: 74 MG/DL — SIGNIFICANT CHANGE UP (ref 70–99)
HCT VFR BLD CALC: 31.7 % — LOW (ref 34.5–45)
HGB BLD-MCNC: 10.2 G/DL — LOW (ref 11.5–15.5)
MAGNESIUM SERPL-MCNC: 2 MG/DL — SIGNIFICANT CHANGE UP (ref 1.6–2.6)
MCHC RBC-ENTMCNC: 31 PG — SIGNIFICANT CHANGE UP (ref 27–34)
MCHC RBC-ENTMCNC: 32.2 GM/DL — SIGNIFICANT CHANGE UP (ref 32–36)
MCV RBC AUTO: 96.5 FL — SIGNIFICANT CHANGE UP (ref 80–100)
PLATELET # BLD AUTO: 253 K/UL — SIGNIFICANT CHANGE UP (ref 150–400)
POTASSIUM SERPL-MCNC: 4 MMOL/L — SIGNIFICANT CHANGE UP (ref 3.5–5.3)
POTASSIUM SERPL-SCNC: 4 MMOL/L — SIGNIFICANT CHANGE UP (ref 3.5–5.3)
RBC # BLD: 3.28 M/UL — LOW (ref 3.8–5.2)
RBC # FLD: 14.9 % — HIGH (ref 10.3–14.5)
SODIUM SERPL-SCNC: 144 MMOL/L — SIGNIFICANT CHANGE UP (ref 135–145)
WBC # BLD: 4.9 K/UL — SIGNIFICANT CHANGE UP (ref 3.8–10.5)
WBC # FLD AUTO: 4.9 K/UL — SIGNIFICANT CHANGE UP (ref 3.8–10.5)

## 2018-01-26 RX ADMIN — ERTAPENEM SODIUM 120 MILLIGRAM(S): 1 INJECTION, POWDER, LYOPHILIZED, FOR SOLUTION INTRAMUSCULAR; INTRAVENOUS at 12:12

## 2018-01-26 RX ADMIN — Medication 500 MILLIGRAM(S): at 17:22

## 2018-01-26 RX ADMIN — Medication 37.5 MILLIGRAM(S): at 12:13

## 2018-01-26 RX ADMIN — ENOXAPARIN SODIUM 40 MILLIGRAM(S): 100 INJECTION SUBCUTANEOUS at 12:13

## 2018-01-26 RX ADMIN — Medication 100 MILLIGRAM(S): at 17:22

## 2018-01-26 RX ADMIN — Medication 100 MILLIGRAM(S): at 05:20

## 2018-01-26 NOTE — PROGRESS NOTE ADULT - PROBLEM SELECTOR PLAN 6
c/w jax sandoval prn
c/w fleet enema PRN, miralax, colace BID
c/w jax sandoval prn

## 2018-01-26 NOTE — PROGRESS NOTE ADULT - PROBLEM SELECTOR PLAN 1
- Ucx noted, dc rocephin started INvanz D#4/7  - dr. Rogers consulted appreciate follow up  - no leukocytosis

## 2018-01-26 NOTE — PROGRESS NOTE ADULT - SUBJECTIVE AND OBJECTIVE BOX
Subjective: non verbal nad . completing course of ab       PHYSICAL EXAM:    Vital Signs Last 24 Hrs  T(C): 37.3 (26 Jan 2018 20:23), Max: 37.3 (26 Jan 2018 20:23)  T(F): 99.2 (26 Jan 2018 20:23), Max: 99.2 (26 Jan 2018 20:23)  HR: 76 (26 Jan 2018 20:23) (73 - 77)  BP: 105/67 (26 Jan 2018 20:23) (105/67 - 124/50)  BP(mean): 60 (26 Jan 2018 13:51) (60 - 60)  RR: 16 (26 Jan 2018 20:23) (16 - 18)  SpO2: 97% (26 Jan 2018 20:23) (95% - 97%)    PHYSICAL EXAM:  GENERAL: NAD, non verbal  CHEST/LUNG: Clear to auscultation bilaterally with good air entry   HEART: S1 S2  regular; no murmurs, gallops or rubs  ABDOMEN: Soft, Nontender, Nondistended; Bowel sounds present  EXTREMITIES: no cyanosis; no edema; no calf tenderness  SKIN: warm and dry; no rash  NERVOUS SYSTEM:  functional quad         LABS/DIAGNOSTIC TESTS                        10.2   4.9   )-----------( 253      ( 26 Jan 2018 07:20 )             31.7     01-26    144  |  110<H>  |  4<L>  ----------------------------<  74  4.0   |  26  |  0.48<L>    Ca    8.4      26 Jan 2018 07:20  Phos  2.4     01-25  Mg     2.0     01-26            meds   acetaminophen   Tablet 650 milliGRAM(s) Oral every 6 hours PRN  ascorbic acid 500 milliGRAM(s) Oral daily  dextrose 5% 1000 milliLiter(s) IV Continuous <Continuous>  dextrose 5%. 1000 milliLiter(s) IV Continuous <Continuous>  dextrose 50% Injectable 12.5 Gram(s) IV Push once  dextrose 50% Injectable 25 Gram(s) IV Push once  dextrose 50% Injectable 25 Gram(s) IV Push once  dextrose Gel 1 Dose(s) Oral once PRN  docusate sodium 100 milliGRAM(s) Oral two times a day  enoxaparin Injectable 40 milliGRAM(s) SubCutaneous daily  ertapenem  IVPB      ertapenem  IVPB 1000 milliGRAM(s) IV Intermittent every 24 hours  glucagon  Injectable 1 milliGRAM(s) IntraMuscular once PRN  insulin lispro (HumaLOG) corrective regimen sliding scale   SubCutaneous three times a day before meals  mirtazapine 15 milliGRAM(s) Oral at bedtime  senna 2 Tablet(s) Oral at bedtime  sodium biphosphate Rectal Enema 1 Enema Rectal daily PRN  venlafaxine XR. 37.5 milliGRAM(s) Oral daily        CULTURES  Culture Results: urine   >100,000 CFU/ml Klebsiella pneumoniae ESBL (01-20 @ 09:25)  Culture Results: blood  No growth at 5 days. (01-20 @ 09:22)  Culture Results: blood  No growth at 5 days. (01-20 @ 09:22)        RADIOLOGY  < from: Xray Chest 1 View AP- PORTABLE-Urgent (01.19.18 @ 21:57) >  EXAM:  XR CHEST PORTABLE URGENT 1V                            PROCEDURE DATE:  01/19/2018          INTERPRETATION:  Chest one view    HISTORY: Weakness and failure to thrive    COMPARISON STUDY: None available    Frontal expiratory view of the chest shows the heart to be normal in   size. The lungs show basilar atelectasis with possible right base   infiltrate versus small tumor and there is no evidence of pneumothorax   nor pleural effusion.    IMPRESSION:  Right base infiltrate versus small tumor. Follow-up study is recommended   as clinically warranted.    Thank you for the courtesy of this referral.    < end of copied text >

## 2018-01-26 NOTE — PROGRESS NOTE ADULT - PROBLEM SELECTOR PLAN 5
supportive care, mirtazapine
c/w roldan  dnr/dni  nutrition consult  hydration
supportive care, mirtazapine

## 2018-01-26 NOTE — PROGRESS NOTE ADULT - SUBJECTIVE AND OBJECTIVE BOX
NP Note discussed with  Primary Attending    Patient is a 88y old  Female who presents with a chief complaint of failure to thrive (20 Jan 2018 03:27)      INTERVAL HPI/OVERNIGHT EVENTS: no o/n events. pending PEG MOnday, appears afebrile.    MEDICATIONS  (STANDING):  ascorbic acid 500 milliGRAM(s) Oral daily  dextrose 5% 1000 milliLiter(s) (75 mL/Hr) IV Continuous <Continuous>  dextrose 5%. 1000 milliLiter(s) (50 mL/Hr) IV Continuous <Continuous>  dextrose 50% Injectable 12.5 Gram(s) IV Push once  dextrose 50% Injectable 25 Gram(s) IV Push once  dextrose 50% Injectable 25 Gram(s) IV Push once  docusate sodium 100 milliGRAM(s) Oral two times a day  enoxaparin Injectable 40 milliGRAM(s) SubCutaneous daily  ertapenem  IVPB      ertapenem  IVPB 1000 milliGRAM(s) IV Intermittent every 24 hours  insulin lispro (HumaLOG) corrective regimen sliding scale   SubCutaneous three times a day before meals  mirtazapine 15 milliGRAM(s) Oral at bedtime  senna 2 Tablet(s) Oral at bedtime  venlafaxine XR. 37.5 milliGRAM(s) Oral daily    MEDICATIONS  (PRN):  acetaminophen   Tablet 650 milliGRAM(s) Oral every 6 hours PRN mild pain 1-3  dextrose Gel 1 Dose(s) Oral once PRN Blood Glucose LESS THAN 70 milliGRAM(s)/deciliter  glucagon  Injectable 1 milliGRAM(s) IntraMuscular once PRN Glucose LESS THAN 70 milligrams/deciliter  sodium biphosphate Rectal Enema 1 Enema Rectal daily PRN constipation      __________________________________________________  REVIEW OF SYSTEMS:    CONSTITUTIONAL: No fever,   unable to obtain ROS 2/2 dementia      Vital Signs Last 24 Hrs  T(C): 37.1 (26 Jan 2018 13:51), Max: 37.1 (26 Jan 2018 13:51)  T(F): 98.7 (26 Jan 2018 13:51), Max: 98.7 (26 Jan 2018 13:51)  HR: 77 (26 Jan 2018 13:51) (73 - 84)  BP: 107/45 (26 Jan 2018 13:51) (97/54 - 124/50)  BP(mean): 60 (26 Jan 2018 13:51) (60 - 60)  RR: 18 (26 Jan 2018 13:51) (16 - 18)  SpO2: 95% (26 Jan 2018 13:51) (95% - 98%)    ________________________________________________  PHYSICAL EXAM:  GENERAL: NAD  HEENT: Normocephalic;  conjunctivae and sclerae clear; moist mucous membranes;   NECK : supple  CHEST/LUNG: diminished at bases  HEART: S1 S2  regular; no murmurs, gallops or rubs  ABDOMEN: Soft, Nontender, Nondistended; Bowel sounds present  EXTREMITIES: no cyanosis; no edema; no calf tenderness  SKIN: warm and dry; no rash  NERVOUS SYSTEM:  Awake and alert; does not respond to questions. non focal exam    _________________________________________________  LABS:                        10.2   4.9   )-----------( 253      ( 26 Jan 2018 07:20 )             31.7     01-26    144  |  110<H>  |  4<L>  ----------------------------<  74  4.0   |  26  |  0.48<L>    Ca    8.4      26 Jan 2018 07:20  Phos  2.4     01-25  Mg     2.0     01-26          CAPILLARY BLOOD GLUCOSE      POCT Blood Glucose.: 88 mg/dL (26 Jan 2018 16:32)  POCT Blood Glucose.: 87 mg/dL (26 Jan 2018 11:24)  POCT Blood Glucose.: 76 mg/dL (26 Jan 2018 07:18)  POCT Blood Glucose.: 92 mg/dL (25 Jan 2018 21:45)  POCT Blood Glucose.: 89 mg/dL (25 Jan 2018 17:55)        RADIOLOGY & ADDITIONAL TESTS:    Imaging  Reviewed:  YES    Consultant(s) Notes Reviewed:   YES

## 2018-01-26 NOTE — PROGRESS NOTE ADULT - PROBLEM SELECTOR PLAN 2
- 2/2 dementia late onset Alzheimer's   will continue with gentle hydration, dysphagia diet,   - for PEG monday with dr Burks (plavix on hold )  - hold lovenox 24hrs prior to procedure

## 2018-01-26 NOTE — PROGRESS NOTE ADULT - PROBLEM SELECTOR PROBLEM 5
Late onset Alzheimer's disease without behavioral disturbance
Dementia
Late onset Alzheimer's disease without behavioral disturbance

## 2018-01-26 NOTE — PROGRESS NOTE ADULT - ATTENDING COMMENTS
Events noted Patient  is  non verbal  awake  but  responsive  Patient  daughter  at bedside  treatment  for UTI in progress  ESBL complete  Ertapenem CXR possible  RLL Pneumonia no symptoms of  Respiratory infection at present  time    Failure to thrive  patient  family would  like  PEG placement  off Plavix  continue  to monitor  Peg placement  on Monday as per GI   Dementia AT stable  Quadriplegia  supportive  care Mental status  is  stable

## 2018-01-27 LAB
ANION GAP SERPL CALC-SCNC: 8 MMOL/L — SIGNIFICANT CHANGE UP (ref 5–17)
APTT BLD: 31.1 SEC — SIGNIFICANT CHANGE UP (ref 27.5–37.4)
BUN SERPL-MCNC: 4 MG/DL — LOW (ref 7–18)
CALCIUM SERPL-MCNC: 8.4 MG/DL — SIGNIFICANT CHANGE UP (ref 8.4–10.5)
CHLORIDE SERPL-SCNC: 106 MMOL/L — SIGNIFICANT CHANGE UP (ref 96–108)
CO2 SERPL-SCNC: 26 MMOL/L — SIGNIFICANT CHANGE UP (ref 22–31)
CREAT SERPL-MCNC: 0.42 MG/DL — LOW (ref 0.5–1.3)
GLUCOSE BLDC GLUCOMTR-MCNC: 68 MG/DL — LOW (ref 70–99)
GLUCOSE BLDC GLUCOMTR-MCNC: 73 MG/DL — SIGNIFICANT CHANGE UP (ref 70–99)
GLUCOSE BLDC GLUCOMTR-MCNC: 77 MG/DL — SIGNIFICANT CHANGE UP (ref 70–99)
GLUCOSE BLDC GLUCOMTR-MCNC: 85 MG/DL — SIGNIFICANT CHANGE UP (ref 70–99)
GLUCOSE BLDC GLUCOMTR-MCNC: 87 MG/DL — SIGNIFICANT CHANGE UP (ref 70–99)
GLUCOSE SERPL-MCNC: 72 MG/DL — SIGNIFICANT CHANGE UP (ref 70–99)
INR BLD: 1.07 RATIO — SIGNIFICANT CHANGE UP (ref 0.88–1.16)
MAGNESIUM SERPL-MCNC: 2 MG/DL — SIGNIFICANT CHANGE UP (ref 1.6–2.6)
POTASSIUM SERPL-MCNC: 4.1 MMOL/L — SIGNIFICANT CHANGE UP (ref 3.5–5.3)
POTASSIUM SERPL-SCNC: 4.1 MMOL/L — SIGNIFICANT CHANGE UP (ref 3.5–5.3)
PROTHROM AB SERPL-ACNC: 11.7 SEC — SIGNIFICANT CHANGE UP (ref 9.8–12.7)
SODIUM SERPL-SCNC: 140 MMOL/L — SIGNIFICANT CHANGE UP (ref 135–145)

## 2018-01-27 RX ADMIN — Medication 37.5 MILLIGRAM(S): at 11:05

## 2018-01-27 RX ADMIN — ERTAPENEM SODIUM 120 MILLIGRAM(S): 1 INJECTION, POWDER, LYOPHILIZED, FOR SOLUTION INTRAMUSCULAR; INTRAVENOUS at 11:06

## 2018-01-27 RX ADMIN — ENOXAPARIN SODIUM 40 MILLIGRAM(S): 100 INJECTION SUBCUTANEOUS at 11:05

## 2018-01-27 RX ADMIN — Medication 500 MILLIGRAM(S): at 11:05

## 2018-01-27 RX ADMIN — Medication 100 MILLIGRAM(S): at 18:10

## 2018-01-27 NOTE — PROGRESS NOTE ADULT - ATTENDING COMMENTS
Events noted Patient  is  non verbal  awake  but  responsive  Patient  daughter  at bedside  treatment  for UTI in progress  ESBL complete  Ertapenem CXR possible  RLL Pneumonia no symptoms of  Respiratory infection at present  time    Failure to thrive  patient  family would  like  PEG placement  off Plavix  continue  to monitor  Peg placement  on Monday as per GI   Dementia AT stable  Quadriplegia  supportive  care Mental status  is  stable Nutritional suopport

## 2018-01-27 NOTE — PROGRESS NOTE ADULT - SUBJECTIVE AND OBJECTIVE BOX
Patient is a 88y old  Female who presents with a chief complaint of failure to thrive (20 Jan 2018 03:27)      INTERVAL HPI/OVERNIGHT EVENTS: Patient  condition unchanged  Non verbal   T(C): 37.3 (01-27-18 @ 20:15), Max: 37.3 (01-27-18 @ 20:15)  HR: 78 (01-27-18 @ 20:15) (72 - 78)  BP: 105/41 (01-27-18 @ 20:15) (105/41 - 110/56)  RR: 17 (01-27-18 @ 20:15) (16 - 17)  SpO2: 97% (01-27-18 @ 20:15) (95% - 97%)  Wt(kg): --Vital Signs Last 24 Hrs  T(C): 37.3 (27 Jan 2018 20:15), Max: 37.3 (27 Jan 2018 20:15)  T(F): 99.1 (27 Jan 2018 20:15), Max: 99.1 (27 Jan 2018 20:15)  HR: 78 (27 Jan 2018 20:15) (72 - 78)  BP: 105/41 (27 Jan 2018 20:15) (105/41 - 110/56)  BP(mean): 68 (27 Jan 2018 13:34) (68 - 68)  RR: 17 (27 Jan 2018 20:15) (16 - 17)  SpO2: 97% (27 Jan 2018 20:15) (95% - 97%)  I&O's Summary      LABS:                        10.2   4.9   )-----------( 253      ( 26 Jan 2018 07:20 )             31.7     01-27    140  |  106  |  4<L>  ----------------------------<  72  4.1   |  26  |  0.42<L>    Ca    8.4      27 Jan 2018 06:59  Mg     2.0     01-27      PT/INR - ( 27 Jan 2018 06:59 )   PT: 11.7 sec;   INR: 1.07 ratio         PTT - ( 27 Jan 2018 06:59 )  PTT:31.1 sec    CAPILLARY BLOOD GLUCOSE      POCT Blood Glucose.: 87 mg/dL (27 Jan 2018 21:39)  POCT Blood Glucose.: 77 mg/dL (27 Jan 2018 18:16)  POCT Blood Glucose.: 68 mg/dL (27 Jan 2018 16:34)  POCT Blood Glucose.: 73 mg/dL (27 Jan 2018 11:03)  POCT Blood Glucose.: 85 mg/dL (27 Jan 2018 07:25)    MEDICATIONS  (STANDING):  ascorbic acid 500 milliGRAM(s) Oral daily  dextrose 5% 1000 milliLiter(s) (75 mL/Hr) IV Continuous <Continuous>  dextrose 5%. 1000 milliLiter(s) (50 mL/Hr) IV Continuous <Continuous>  dextrose 50% Injectable 12.5 Gram(s) IV Push once  dextrose 50% Injectable 25 Gram(s) IV Push once  dextrose 50% Injectable 25 Gram(s) IV Push once  docusate sodium 100 milliGRAM(s) Oral two times a day  ertapenem  IVPB      ertapenem  IVPB 1000 milliGRAM(s) IV Intermittent every 24 hours  insulin lispro (HumaLOG) corrective regimen sliding scale   SubCutaneous three times a day before meals  mirtazapine 15 milliGRAM(s) Oral at bedtime  senna 2 Tablet(s) Oral at bedtime  venlafaxine XR. 37.5 milliGRAM(s) Oral daily    MEDICATIONS  (PRN):  acetaminophen   Tablet 650 milliGRAM(s) Oral every 6 hours PRN mild pain 1-3  dextrose Gel 1 Dose(s) Oral once PRN Blood Glucose LESS THAN 70 milliGRAM(s)/deciliter  glucagon  Injectable 1 milliGRAM(s) IntraMuscular once PRN Glucose LESS THAN 70 milligrams/deciliter  sodium biphosphate Rectal Enema 1 Enema Rectal daily PRN constipation        REVIEW OF SYSTEMS:  CONSTITUTIONAL: No fever, weight loss, or fatigue  EYES: No eye pain, visual disturbances, or discharge  ENMT:  No difficulty hearing, tinnitus, vertigo; No sinus or throat pain  NECK: No pain or stiffness  BREASTS: No pain, masses, or nipple discharge  RESPIRATORY: No cough, wheezing, chills or hemoptysis; No shortness of breath  CARDIOVASCULAR: No chest pain, palpitations, dizziness, or leg swelling  GASTROINTESTINAL: No abdominal or epigastric pain. No nausea, vomiting, or hematemesis; No diarrhea or constipation. No melena or hematochezia.  GENITOURINARY: No dysuria, frequency, hematuria, or incontinence  NEUROLOGICAL: No headaches, memory loss, loss of strength, numbness, or tremors  SKIN: No itching, burning, rashes, or lesions   LYMPH NODES: No enlarged glands  ENDOCRINE: No heat or cold intolerance; No hair loss  MUSCULOSKELETAL: No joint pain or swelling; No muscle, back, or extremity pain  PSYCHIATRIC: No depression, anxiety, mood swings, or difficulty sleeping  HEME/LYMPH: No easy bruising, or bleeding gums  ALLERGY AND IMMUNOLOGIC: No hives or eczema    RADIOLOGY & ADDITIONAL TESTS:    Imaging Personally Reviewed:  [ ] YES  [ ] NO    Consultant(s) Notes Reviewed:  [x ] YES  [ ] NO    PHYSICAL EXAM:  GENERAL: NAD, well-groomed, well-developed  HEAD:  Atraumatic, Normocephalic  EYES: EOMI, PERRLA, conjunctiva and sclera clear  ENMT: No tonsillar erythema, exudates, or enlargement; Moist mucous membranes, Good dentition, No lesions  NECK: Supple, No JVD, Normal thyroid  NERVOUS SYSTEM:  Alert & Oriented X3, Good concentration; Motor Strength 5/5 B/L upper and lower extremities; DTRs 2+ intact and symmetric  CHEST/LUNG: Clear to percussion bilaterally; No rales, rhonchi, wheezing, or rubs  HEART: Regular rate and rhythm; No murmurs, rubs, or gallops  ABDOMEN: Soft, Nontender, Nondistended; Bowel sounds present  EXTREMITIES:  2+ Peripheral Pulses, No clubbing, cyanosis, or edema  LYMPH: No lymphadenopathy noted  SKIN: No rashes or lesions    Care Discussed with Consultants/Other Providers [ x] YES  [ ] NO

## 2018-01-27 NOTE — PROGRESS NOTE ADULT - SUBJECTIVE AND OBJECTIVE BOX
Subjective: lying in bed , non verbal, nad . no fevers . completing course of ab for uti       PHYSICAL EXAM:    Vital Signs Last 24 Hrs  T(C): 37.1 (27 Jan 2018 13:34), Max: 37.3 (26 Jan 2018 20:23)  T(F): 98.8 (27 Jan 2018 13:34), Max: 99.2 (26 Jan 2018 20:23)  HR: 72 (27 Jan 2018 13:34) (72 - 76)  BP: 110/56 (27 Jan 2018 13:34) (105/67 - 110/56)  BP(mean): 68 (27 Jan 2018 13:34) (68 - 68)  RR: 17 (27 Jan 2018 13:34) (16 - 17)  SpO2: 96% (27 Jan 2018 13:34) (95% - 97%)    PHYSICAL EXAM:  GENERAL: NAD, non verbal  CHEST/LUNG: Clear to auscultation bilaterally with good air entry   HEART: S1 S2  regular; no murmurs, gallops or rubs  ABDOMEN: Soft, Nontender, Nondistended; Bowel sounds present  EXTREMITIES: no cyanosis; no edema; no calf tenderness  SKIN: warm and dry; no rash  NERVOUS SYSTEM:  functional quad         LABS/DIAGNOSTIC TESTS                        10.2   4.9   )-----------( 253      ( 26 Jan 2018 07:20 )             31.7     01-27    140  |  106  |  4<L>  ----------------------------<  72  4.1   |  26  |  0.42<L>    Ca    8.4      27 Jan 2018 06:59  Mg     2.0     01-27      PT/INR - ( 27 Jan 2018 06:59 )   PT: 11.7 sec;   INR: 1.07 ratio         PTT - ( 27 Jan 2018 06:59 )  PTT:31.1 sec      meds   acetaminophen   Tablet 650 milliGRAM(s) Oral every 6 hours PRN  ascorbic acid 500 milliGRAM(s) Oral daily  dextrose 5% 1000 milliLiter(s) IV Continuous <Continuous>  dextrose 5%. 1000 milliLiter(s) IV Continuous <Continuous>  dextrose 50% Injectable 12.5 Gram(s) IV Push once  dextrose 50% Injectable 25 Gram(s) IV Push once  dextrose 50% Injectable 25 Gram(s) IV Push once  dextrose Gel 1 Dose(s) Oral once PRN  docusate sodium 100 milliGRAM(s) Oral two times a day  ertapenem  IVPB      ertapenem  IVPB 1000 milliGRAM(s) IV Intermittent every 24 hours  glucagon  Injectable 1 milliGRAM(s) IntraMuscular once PRN  insulin lispro (HumaLOG) corrective regimen sliding scale   SubCutaneous three times a day before meals  mirtazapine 15 milliGRAM(s) Oral at bedtime  senna 2 Tablet(s) Oral at bedtime  sodium biphosphate Rectal Enema 1 Enema Rectal daily PRN  venlafaxine XR. 37.5 milliGRAM(s) Oral daily        CULTURES  no new cx       RADIOLOGY  no new xrays

## 2018-01-28 LAB
GLUCOSE BLDC GLUCOMTR-MCNC: 102 MG/DL — HIGH (ref 70–99)
GLUCOSE BLDC GLUCOMTR-MCNC: 77 MG/DL — SIGNIFICANT CHANGE UP (ref 70–99)
GLUCOSE BLDC GLUCOMTR-MCNC: 78 MG/DL — SIGNIFICANT CHANGE UP (ref 70–99)
GLUCOSE BLDC GLUCOMTR-MCNC: 91 MG/DL — SIGNIFICANT CHANGE UP (ref 70–99)

## 2018-01-28 RX ADMIN — MIRTAZAPINE 15 MILLIGRAM(S): 45 TABLET, ORALLY DISINTEGRATING ORAL at 21:35

## 2018-01-28 RX ADMIN — Medication 100 MILLIGRAM(S): at 17:27

## 2018-01-28 RX ADMIN — Medication 500 MILLIGRAM(S): at 12:26

## 2018-01-28 RX ADMIN — Medication 37.5 MILLIGRAM(S): at 12:26

## 2018-01-28 RX ADMIN — SENNA PLUS 2 TABLET(S): 8.6 TABLET ORAL at 21:35

## 2018-01-28 RX ADMIN — ERTAPENEM SODIUM 120 MILLIGRAM(S): 1 INJECTION, POWDER, LYOPHILIZED, FOR SOLUTION INTRAMUSCULAR; INTRAVENOUS at 12:25

## 2018-01-29 LAB
ANION GAP SERPL CALC-SCNC: 8 MMOL/L — SIGNIFICANT CHANGE UP (ref 5–17)
BUN SERPL-MCNC: 9 MG/DL — SIGNIFICANT CHANGE UP (ref 7–18)
CALCIUM SERPL-MCNC: 8.8 MG/DL — SIGNIFICANT CHANGE UP (ref 8.4–10.5)
CHLORIDE SERPL-SCNC: 105 MMOL/L — SIGNIFICANT CHANGE UP (ref 96–108)
CO2 SERPL-SCNC: 27 MMOL/L — SIGNIFICANT CHANGE UP (ref 22–31)
CREAT SERPL-MCNC: 0.5 MG/DL — SIGNIFICANT CHANGE UP (ref 0.5–1.3)
GLUCOSE BLDC GLUCOMTR-MCNC: 101 MG/DL — HIGH (ref 70–99)
GLUCOSE BLDC GLUCOMTR-MCNC: 79 MG/DL — SIGNIFICANT CHANGE UP (ref 70–99)
GLUCOSE BLDC GLUCOMTR-MCNC: 84 MG/DL — SIGNIFICANT CHANGE UP (ref 70–99)
GLUCOSE BLDC GLUCOMTR-MCNC: 86 MG/DL — SIGNIFICANT CHANGE UP (ref 70–99)
GLUCOSE BLDC GLUCOMTR-MCNC: 93 MG/DL — SIGNIFICANT CHANGE UP (ref 70–99)
GLUCOSE BLDC GLUCOMTR-MCNC: 98 MG/DL — SIGNIFICANT CHANGE UP (ref 70–99)
GLUCOSE SERPL-MCNC: 78 MG/DL — SIGNIFICANT CHANGE UP (ref 70–99)
HCT VFR BLD CALC: 33.9 % — LOW (ref 34.5–45)
HGB BLD-MCNC: 10.9 G/DL — LOW (ref 11.5–15.5)
MCHC RBC-ENTMCNC: 31.4 PG — SIGNIFICANT CHANGE UP (ref 27–34)
MCHC RBC-ENTMCNC: 32.2 GM/DL — SIGNIFICANT CHANGE UP (ref 32–36)
MCV RBC AUTO: 97.6 FL — SIGNIFICANT CHANGE UP (ref 80–100)
PLATELET # BLD AUTO: 229 K/UL — SIGNIFICANT CHANGE UP (ref 150–400)
POTASSIUM SERPL-MCNC: 4.1 MMOL/L — SIGNIFICANT CHANGE UP (ref 3.5–5.3)
POTASSIUM SERPL-SCNC: 4.1 MMOL/L — SIGNIFICANT CHANGE UP (ref 3.5–5.3)
RBC # BLD: 3.47 M/UL — LOW (ref 3.8–5.2)
RBC # FLD: 15.4 % — HIGH (ref 10.3–14.5)
SODIUM SERPL-SCNC: 140 MMOL/L — SIGNIFICANT CHANGE UP (ref 135–145)
WBC # BLD: 4.9 K/UL — SIGNIFICANT CHANGE UP (ref 3.8–10.5)
WBC # FLD AUTO: 4.9 K/UL — SIGNIFICANT CHANGE UP (ref 3.8–10.5)

## 2018-01-29 RX ADMIN — ERTAPENEM SODIUM 120 MILLIGRAM(S): 1 INJECTION, POWDER, LYOPHILIZED, FOR SOLUTION INTRAMUSCULAR; INTRAVENOUS at 14:05

## 2018-01-29 RX ADMIN — MIRTAZAPINE 15 MILLIGRAM(S): 45 TABLET, ORALLY DISINTEGRATING ORAL at 21:47

## 2018-01-29 RX ADMIN — Medication 100 MILLIGRAM(S): at 17:33

## 2018-01-29 RX ADMIN — SENNA PLUS 2 TABLET(S): 8.6 TABLET ORAL at 21:47

## 2018-01-29 NOTE — PROGRESS NOTE ADULT - SUBJECTIVE AND OBJECTIVE BOX
NP Note discussed with  Primary Attending    Patient is a 88y old  Female who presents with a chief complaint of failure to thrive (20 Jan 2018 03:27)    INTERVAL HPI: Patient is a 88F from [facility], alert, non-ambulatory, with PMH Alzheimer's dementia, HTN, constipation, anxiety, dysphagia, heart disease, major depressive d/o, presenting with poor po intake and low BP in the NH. Patient was seen and examined, awake, does not follow commands, appears comfortable. Spoke to daughter Phil at bedside, who is HCP, states that she wants patient to be DNR and DNI, despite prior MOLST. ROS unobtainable from patient due to dementia. Pt was started on IVF and treated w/ ceftriaxone for a UTI. Surgery discussed PEG placement vs palliative for pt given FTT and family wishes to have PEG placed. Plavix held x 5 days for planned PEG tube.     Overnight events: No acute events. pt NPO for PEg.    MEDICATIONS  (STANDING):  ascorbic acid 500 milliGRAM(s) Oral daily  dextrose 5% 1000 milliLiter(s) (75 mL/Hr) IV Continuous <Continuous>  dextrose 5%. 1000 milliLiter(s) (50 mL/Hr) IV Continuous <Continuous>  dextrose 50% Injectable 12.5 Gram(s) IV Push once  dextrose 50% Injectable 25 Gram(s) IV Push once  dextrose 50% Injectable 25 Gram(s) IV Push once  docusate sodium 100 milliGRAM(s) Oral two times a day  ertapenem  IVPB      ertapenem  IVPB 1000 milliGRAM(s) IV Intermittent every 24 hours  insulin lispro (HumaLOG) corrective regimen sliding scale   SubCutaneous three times a day before meals  mirtazapine 15 milliGRAM(s) Oral at bedtime  senna 2 Tablet(s) Oral at bedtime  venlafaxine XR. 37.5 milliGRAM(s) Oral daily    MEDICATIONS  (PRN):  acetaminophen   Tablet 650 milliGRAM(s) Oral every 6 hours PRN mild pain 1-3  dextrose Gel 1 Dose(s) Oral once PRN Blood Glucose LESS THAN 70 milliGRAM(s)/deciliter  glucagon  Injectable 1 milliGRAM(s) IntraMuscular once PRN Glucose LESS THAN 70 milligrams/deciliter  sodium biphosphate Rectal Enema 1 Enema Rectal daily PRN constipation  __________________________________________________  REVIEW OF SYSTEMS:  deferred due to nonverbal pt    Vital Signs Last 24 Hrs  T(C): 36.8 (29 Jan 2018 05:27), Max: 37.6 (28 Jan 2018 14:00)  T(F): 98.3 (29 Jan 2018 05:27), Max: 99.6 (28 Jan 2018 14:00)  HR: 79 (29 Jan 2018 05:27) (79 - 97)  BP: 107/45 (29 Jan 2018 05:27) (97/41 - 107/45)  RR: 16 (29 Jan 2018 05:27) (16 - 18)  SpO2: 95% (29 Jan 2018 05:27) (93% - 95%)    ________________________________________________  PHYSICAL EXAM:  GENERAL: NAD  HEENT: Normocephalic;  conjunctivae and sclerae clear; moist mucous membranes;   NECK : supple  CHEST/LUNG: Clear to auscultation bilaterally with good air entry   HEART: S1 S2  regular; no murmurs, gallops or rubs  ABDOMEN: Soft, Nontender, Nondistended; Bowel sounds present  EXTREMITIES: no cyanosis; no edema; no calf tenderness  SKIN: warm and dry; no rash  NERVOUS SYSTEM:  Awake and alert; Oriented  to place, person and time ; no new deficits    ________________________________________________  LABS:                        10.9   4.9   )-----------( 229      ( 29 Jan 2018 07:11 )             33.9     01-29    140  |  105  |  9   ----------------------------<  78  4.1   |  27  |  0.50    Ca    8.8      29 Jan 2018 07:11    CAPILLARY BLOOD GLUCOSE      POCT Blood Glucose.: 79 mg/dL (29 Jan 2018 11:32)  POCT Blood Glucose.: 84 mg/dL (29 Jan 2018 07:48)  POCT Blood Glucose.: 86 mg/dL (29 Jan 2018 05:39)  POCT Blood Glucose.: 102 mg/dL (28 Jan 2018 21:47)  POCT Blood Glucose.: 91 mg/dL (28 Jan 2018 16:34)        RADIOLOGY & ADDITIONAL TESTS:    Imaging Personally Reviewed:  YES    Consultant(s) Notes Reviewed:   YES    Care Discussed with Consultants :     Plan of care was discussed with patient and /or primary care giver; all questions and concerns were addressed and care was aligned with patient's wishes.

## 2018-01-29 NOTE — DISCHARGE NOTE ADULT - INSTRUCTIONS
You now have a feeding tube that will assist in maintaining your nutrition. Ensure that you are receiving the nutritional support that is recommended by the provider upon discharge.

## 2018-01-29 NOTE — DISCHARGE NOTE ADULT - MEDICATION SUMMARY - MEDICATIONS TO STOP TAKING
I will STOP taking the medications listed below when I get home from the hospital:    Melatonin 3 mg oral tablet  -- 1 tab(s) by mouth once (at bedtime)    Fleet Enema 7 g-19 g rectal enema  -- 133 milliliter(s) rectally once, As Needed    Lasix 20 mg oral tablet  -- 1 tab(s) by mouth every other day (at bedtime)    metoprolol tartrate 50 mg oral tablet  -- 1 tab(s) by mouth 2 times a day    Zithromax 250 mg oral tablet  -- 1 tab(s) by mouth once a day until 1/22/18

## 2018-01-29 NOTE — DISCHARGE NOTE ADULT - HOSPITAL COURSE
Patient is a 88F from [facility], alert, non-ambulatory, with PMH Alzheimer's dementia, HTN, constipation, anxiety, dysphagia, heart disease, major depressive d/o, presenting with poor po intake and low BP in the NH. Admitted for UTI, dehydration and failure to thrive.  She was found with ESBL, started on Invanz, later on changed to Meropenem. Completed 7 days course, f/u by dr. Sue JAIN.  For Failure to thrive 2/2 dementia late onset Alzheimer's, and poor PO intake, palliative care involved and goals are to pursue artificial nutrition. Patient on plavix, held x5 days. continued with gentle hydration and dysphagia diet.  s/p PEG 1/29/2018, started feedings and tolerating well.   Electrolyte imbalance currently resolved, s/p repletion.   Patient is hemodynamically stable and cleared for discharge today to [facility]. Patient is a 88F from [facility], alert, non-ambulatory, with PMH Alzheimer's dementia, HTN, constipation, anxiety, dysphagia, heart disease, major depressive d/o, presenting with poor po intake and low BP in the NH. Admitted for UTI, dehydration and failure to thrive.  She was found with ESBL, started on Invanz, later on changed to Meropenem. Completed 7 days course, f/u by dr. Sue JAIN. Contact isolation discontinued.  For Failure to thrive 2/2 dementia late onset Alzheimer's, and poor PO intake, palliative care involved and goals are to pursue artificial nutrition. Patient on plavix, held x5 days. continued with gentle hydration and dysphagia diet.  s/p PEG 1/29/2018, started feedings and tolerating well.   Electrolyte imbalance currently resolved, s/p repletion.   Patient is hemodynamically stable and cleared for discharge today to [facility]. Patient is a 88F from [facility], alert, non-ambulatory, with PMH Alzheimer's dementia, HTN, constipation, anxiety, dysphagia, heart disease, major depressive d/o, presenting with poor po intake and low BP in the NH. Admitted for UTI, dehydration and failure to thrive.  She was found with ESBL, started on Invanz, later on changed to Meropenem. Completed 7 days course, f/u by dr. Sue JAIN. Contact isolation discontinued.  For Failure to thrive 2/2 dementia late onset Alzheimer's, and poor PO intake, palliative care involved and goals are to pursue artificial nutrition. Patient on plavix, held x5 days. continued with gentle hydration and dysphagia diet.  s/p PEG 1/29/2018, started feedings and tolerating well.   Electrolyte imbalance currently resolved, s/p repletion. Her stay was complicated by fevers, blood and urine cultures were negative. RLL infiltrate was worsening, she started on Zosyn. Picc was placed and patient is stable for discharge.  Patient is hemodynamically stable and cleared for discharge today to [facility].

## 2018-01-29 NOTE — DISCHARGE NOTE ADULT - CARE PROVIDER_API CALL
Yevgeniy Goetz), Medicine  [address] [place] [address]  Phone: [phone]  Fax: [phone]

## 2018-01-29 NOTE — PROGRESS NOTE ADULT - ATTENDING COMMENTS
Events noted Patient  is  non verbal  awake  but  responsive  Patient  daughter  at bedside  treatment  for UTI in progress  ESBL complete  Ertapenem CXR possible  RLL Pneumonia no symptoms of  Respiratory infection at present  time    Failure to thrive  patient   Peg placement  done today start feeding as per GI   Dementia AT stable  Quadriplegia  supportive  care Mental status  is  stable Discharge  planning

## 2018-01-29 NOTE — DISCHARGE NOTE ADULT - PLAN OF CARE
For your nutrition to improve with the feeding tube You know have a feeding tube in which you will receive your medications as well as nutrition.  Ensure that anyone who accesses the feeding tube washes their hands to prevent infection. After administration of medications ensure that the feeding tube is flushed with at least 30-60 mls of water to prevent the feeding tube from clogging. You have completed course of antibiotics. Contact isolation discontinued. continue Zosyn until 2/7/2018 for total of 7 days. Strict aspiration precautions.

## 2018-01-29 NOTE — PROGRESS NOTE ADULT - PROBLEM SELECTOR PLAN 2
- Family wishing to have peg placed; plavix held x 5 days.  - PEg placement today  - Possible dc to long-term tomorrow if tolerating peg

## 2018-01-29 NOTE — DISCHARGE NOTE ADULT - SECONDARY DIAGNOSIS.
ESBL (extended spectrum beta-lactamase) producing bacteria infection HCAP (healthcare-associated pneumonia)

## 2018-01-29 NOTE — DISCHARGE NOTE ADULT - MEDICATION SUMMARY - MEDICATIONS TO TAKE
I will START or STAY ON the medications listed below when I get home from the hospital:    acetaminophen 325 mg oral tablet  -- 2 tab(s) by gastrostomy tube every 6 hours, As Needed -3  -- Indication: For Pain    losartan 50 mg oral tablet  -- 1 tab(s) by gastrostomy tube once a day  -- Indication: For Hypertension    Remeron 15 mg oral tablet  -- 1 tab(s) by gastrostomy tube once a day (at bedtime)  -- Indication: For Dementia    Effexor XR 37.5 mg oral capsule, extended release  -- 1 cap(s) by mouth once a day  -- Indication: For Dementia    clopidogrel 75 mg oral tablet  -- 1 tab(s) by gastrostomy tube once a day  -- Indication: For Cad    senna oral tablet  -- 2 tab(s) by gastrostomy tube once a day (at bedtime)  -- Indication: For Constipation    docusate sodium 10 mg/mL oral liquid  -- 10 milliliter(s) by mouth 2 times a day  -- Indication: For Constipation    MiraLax oral powder for reconstitution  -- 17 gram(s) by gastrostomy tube once a day  -- Indication: For Constipation    ascorbic acid 500 mg oral tablet  -- 1 tab(s) by gastrostomy tube once a day  -- Indication: For Supplement I will START or STAY ON the medications listed below when I get home from the hospital:    acetaminophen 325 mg oral tablet  -- 2 tab(s) by gastrostomy tube every 6 hours, As Needed -3  -- Indication: For Pain    losartan 50 mg oral tablet  -- 1 tab(s) by gastrostomy tube once a day  -- Indication: For Hypertension    Remeron 15 mg oral tablet  -- 1 tab(s) by gastrostomy tube once a day (at bedtime)  -- Indication: For Dementia    Effexor XR 37.5 mg oral capsule, extended release  -- 1 cap(s) by mouth once a day  -- Indication: For Dementia    clopidogrel 75 mg oral tablet  -- 1 tab(s) by gastrostomy tube once a day  -- Indication: For Cad    senna oral tablet  -- 2 tab(s) by gastrostomy tube once a day (at bedtime)  -- Indication: For Constipation    docusate sodium 10 mg/mL oral liquid  -- 10 milliliter(s) by mouth 2 times a day  -- Indication: For Constipation    MiraLax oral powder for reconstitution  -- 17 gram(s) by gastrostomy tube once a day  -- Indication: For Constipation    piperacillin-tazobactam 3 g-0.375 g/50 mL intravenous solution  -- 3.375 milligram(s) intravenous every 8 hours until 2/7/2018  -- Indication: For HCAP (healthcare-associated pneumonia)    ascorbic acid 500 mg oral tablet  -- 1 tab(s) by gastrostomy tube once a day  -- Indication: For Supplement

## 2018-01-30 LAB
APPEARANCE UR: CLEAR — SIGNIFICANT CHANGE UP
BILIRUB UR-MCNC: NEGATIVE — SIGNIFICANT CHANGE UP
COLOR SPEC: YELLOW — SIGNIFICANT CHANGE UP
DIFF PNL FLD: ABNORMAL
GLUCOSE BLDC GLUCOMTR-MCNC: 101 MG/DL — HIGH (ref 70–99)
GLUCOSE BLDC GLUCOMTR-MCNC: 103 MG/DL — HIGH (ref 70–99)
GLUCOSE BLDC GLUCOMTR-MCNC: 73 MG/DL — SIGNIFICANT CHANGE UP (ref 70–99)
GLUCOSE BLDC GLUCOMTR-MCNC: 78 MG/DL — SIGNIFICANT CHANGE UP (ref 70–99)
GLUCOSE UR QL: NEGATIVE — SIGNIFICANT CHANGE UP
HCT VFR BLD CALC: 37.1 % — SIGNIFICANT CHANGE UP (ref 34.5–45)
HGB BLD-MCNC: 11.4 G/DL — LOW (ref 11.5–15.5)
KETONES UR-MCNC: ABNORMAL
LEUKOCYTE ESTERASE UR-ACNC: ABNORMAL
MAGNESIUM SERPL-MCNC: 1.9 MG/DL — SIGNIFICANT CHANGE UP (ref 1.6–2.6)
MCHC RBC-ENTMCNC: 29.5 PG — SIGNIFICANT CHANGE UP (ref 27–34)
MCHC RBC-ENTMCNC: 30.7 GM/DL — LOW (ref 32–36)
MCV RBC AUTO: 96 FL — SIGNIFICANT CHANGE UP (ref 80–100)
NITRITE UR-MCNC: NEGATIVE — SIGNIFICANT CHANGE UP
PH UR: 7 — SIGNIFICANT CHANGE UP (ref 5–8)
PHOSPHATE SERPL-MCNC: 2.7 MG/DL — SIGNIFICANT CHANGE UP (ref 2.5–4.5)
PLATELET # BLD AUTO: 252 K/UL — SIGNIFICANT CHANGE UP (ref 150–400)
PROT UR-MCNC: NEGATIVE — SIGNIFICANT CHANGE UP
RBC # BLD: 3.86 M/UL — SIGNIFICANT CHANGE UP (ref 3.8–5.2)
RBC # FLD: 15.5 % — HIGH (ref 10.3–14.5)
SP GR SPEC: 1.01 — SIGNIFICANT CHANGE UP (ref 1.01–1.02)
UROBILINOGEN FLD QL: NEGATIVE — SIGNIFICANT CHANGE UP
WBC # BLD: 7.8 K/UL — SIGNIFICANT CHANGE UP (ref 3.8–10.5)
WBC # FLD AUTO: 7.8 K/UL — SIGNIFICANT CHANGE UP (ref 3.8–10.5)

## 2018-01-30 PROCEDURE — 71045 X-RAY EXAM CHEST 1 VIEW: CPT | Mod: 26

## 2018-01-30 RX ORDER — METOPROLOL TARTRATE 50 MG
1 TABLET ORAL
Qty: 0 | Refills: 0 | COMMUNITY

## 2018-01-30 RX ORDER — ACETAMINOPHEN 500 MG
2 TABLET ORAL
Qty: 0 | Refills: 0 | COMMUNITY
Start: 2018-01-30

## 2018-01-30 RX ORDER — ACETAMINOPHEN 500 MG
1000 TABLET ORAL ONCE
Qty: 0 | Refills: 0 | Status: COMPLETED | OUTPATIENT
Start: 2018-01-30 | End: 2018-01-30

## 2018-01-30 RX ORDER — ACETAMINOPHEN 500 MG
2 TABLET ORAL
Qty: 0 | Refills: 0 | DISCHARGE
Start: 2018-01-30

## 2018-01-30 RX ORDER — MIRTAZAPINE 45 MG/1
1 TABLET, ORALLY DISINTEGRATING ORAL
Qty: 0 | Refills: 0 | COMMUNITY

## 2018-01-30 RX ORDER — AZITHROMYCIN 500 MG/1
1 TABLET, FILM COATED ORAL
Qty: 0 | Refills: 0 | COMMUNITY

## 2018-01-30 RX ORDER — LOSARTAN POTASSIUM 100 MG/1
1 TABLET, FILM COATED ORAL
Qty: 0 | Refills: 0 | COMMUNITY

## 2018-01-30 RX ORDER — LANOLIN ALCOHOL/MO/W.PET/CERES
1 CREAM (GRAM) TOPICAL
Qty: 0 | Refills: 0 | COMMUNITY

## 2018-01-30 RX ORDER — SENNA PLUS 8.6 MG/1
2 TABLET ORAL
Qty: 0 | Refills: 0 | DISCHARGE
Start: 2018-01-30

## 2018-01-30 RX ORDER — DOCUSATE SODIUM 100 MG
1 CAPSULE ORAL
Qty: 0 | Refills: 0 | COMMUNITY

## 2018-01-30 RX ORDER — CLOPIDOGREL BISULFATE 75 MG/1
75 TABLET, FILM COATED ORAL DAILY
Qty: 0 | Refills: 0 | Status: DISCONTINUED | OUTPATIENT
Start: 2018-02-01 | End: 2018-02-02

## 2018-01-30 RX ORDER — ACETAMINOPHEN 500 MG
2 TABLET ORAL
Qty: 0 | Refills: 0 | COMMUNITY

## 2018-01-30 RX ORDER — ASCORBIC ACID 60 MG
1 TABLET,CHEWABLE ORAL
Qty: 0 | Refills: 0 | DISCHARGE
Start: 2018-01-30

## 2018-01-30 RX ORDER — CLOPIDOGREL BISULFATE 75 MG/1
1 TABLET, FILM COATED ORAL
Qty: 0 | Refills: 0 | COMMUNITY
Start: 2018-01-30

## 2018-01-30 RX ORDER — CLOPIDOGREL BISULFATE 75 MG/1
1 TABLET, FILM COATED ORAL
Qty: 0 | Refills: 0 | DISCHARGE
Start: 2018-01-30

## 2018-01-30 RX ORDER — ACETAMINOPHEN 500 MG
650 TABLET ORAL EVERY 6 HOURS
Qty: 0 | Refills: 0 | Status: DISCONTINUED | OUTPATIENT
Start: 2018-01-30 | End: 2018-02-02

## 2018-01-30 RX ORDER — FUROSEMIDE 40 MG
1 TABLET ORAL
Qty: 0 | Refills: 0 | COMMUNITY

## 2018-01-30 RX ORDER — CLOPIDOGREL BISULFATE 75 MG/1
1 TABLET, FILM COATED ORAL
Qty: 0 | Refills: 0 | COMMUNITY

## 2018-01-30 RX ORDER — POLYETHYLENE GLYCOL 3350 17 G/17G
17 POWDER, FOR SOLUTION ORAL
Qty: 0 | Refills: 0 | COMMUNITY

## 2018-01-30 RX ORDER — ASCORBIC ACID 60 MG
1 TABLET,CHEWABLE ORAL
Qty: 0 | Refills: 0 | COMMUNITY

## 2018-01-30 RX ADMIN — MIRTAZAPINE 15 MILLIGRAM(S): 45 TABLET, ORALLY DISINTEGRATING ORAL at 23:54

## 2018-01-30 RX ADMIN — SENNA PLUS 2 TABLET(S): 8.6 TABLET ORAL at 23:54

## 2018-01-30 RX ADMIN — Medication 650 MILLIGRAM(S): at 20:55

## 2018-01-30 RX ADMIN — Medication 37.5 MILLIGRAM(S): at 11:59

## 2018-01-30 RX ADMIN — Medication 500 MILLIGRAM(S): at 11:59

## 2018-01-30 RX ADMIN — Medication 100 MILLIGRAM(S): at 17:50

## 2018-01-30 RX ADMIN — Medication 400 MILLIGRAM(S): at 23:55

## 2018-01-30 RX ADMIN — Medication 100 MILLIGRAM(S): at 05:33

## 2018-01-30 NOTE — PROGRESS NOTE ADULT - SUBJECTIVE AND OBJECTIVE BOX
Subjective: lying in bed awake , confused , nad. completed course of ab. for discharge. s/p peg tolerating feeds       PHYSICAL EXAM:    Vital Signs Last 24 Hrs  T(C): 36.6 (30 Jan 2018 14:42), Max: 37.2 (29 Jan 2018 20:17)  T(F): 97.8 (30 Jan 2018 14:42), Max: 99 (29 Jan 2018 20:17)  HR: 102 (30 Jan 2018 14:42) (93 - 102)  BP: 101/50 (30 Jan 2018 14:42) (101/50 - 123/57)  BP(mean): --  RR: 18 (30 Jan 2018 14:42) (16 - 18)  SpO2: 98% (30 Jan 2018 14:42) (95% - 98%)    PHYSICAL EXAM:  GENERAL: NAD, non verbal  CHEST/LUNG: Clear to auscultation bilaterally with good air entry   HEART: S1 S2  regular; no murmurs, gallops or rubs  ABDOMEN: Soft, Nontender, Nondistended; Bowel sounds present  EXTREMITIES: no cyanosis; no edema; no calf tenderness  SKIN: warm and dry; no rash  NERVOUS SYSTEM:  functional quad         LABS/DIAGNOSTIC TESTS                        11.4   7.8   )-----------( 252      ( 30 Jan 2018 06:51 )             37.1     01-29    140  |  105  |  9   ----------------------------<  78  4.1   |  27  |  0.50    Ca    8.8      29 Jan 2018 07:11  Phos  2.7     01-30  Mg     1.9     01-30            meds  acetaminophen   Tablet 650 milliGRAM(s) Oral every 6 hours PRN  ascorbic acid 500 milliGRAM(s) Oral daily  dextrose 5% 1000 milliLiter(s) IV Continuous <Continuous>  dextrose 5%. 1000 milliLiter(s) IV Continuous <Continuous>  dextrose 50% Injectable 12.5 Gram(s) IV Push once  dextrose 50% Injectable 25 Gram(s) IV Push once  dextrose 50% Injectable 25 Gram(s) IV Push once  dextrose Gel 1 Dose(s) Oral once PRN  docusate sodium 100 milliGRAM(s) Oral two times a day  glucagon  Injectable 1 milliGRAM(s) IntraMuscular once PRN  insulin lispro (HumaLOG) corrective regimen sliding scale   SubCutaneous three times a day before meals  mirtazapine 15 milliGRAM(s) Oral at bedtime  senna 2 Tablet(s) Oral at bedtime  sodium biphosphate Rectal Enema 1 Enema Rectal daily PRN  venlafaxine XR. 37.5 milliGRAM(s) Oral daily        CULTURES  no new cx       RADIOLOGY  no new xrays

## 2018-01-30 NOTE — PROGRESS NOTE ADULT - SUBJECTIVE AND OBJECTIVE BOX
No acute events overnight.    T(C): 36.8 (01-30-18 @ 05:51)  T(F): 98.2 (01-30-18 @ 05:51), Max: 99 (01-29-18 @ 20:17)  HR: 96 (01-30-18 @ 05:51) (91 - 96)  BP: 116/52 (01-30-18 @ 05:51) (116/52 - 123/57)    Gen: NAD  CVS: RRR; S1/S2  Chest: CTABL  Abd: S/NT/ND; PEG tube in place, no bleeding or discharge                        11.4   7.8   )-----------( 252      ( 30 Jan 2018 06:51 )             37.1   01-29    140  |  105  |  9   ----------------------------<  78  4.1   |  27  |  0.50    Ca    8.8      29 Jan 2018 07:11  Phos  2.7     01-30  Mg     1.9     01-30

## 2018-01-30 NOTE — CHART NOTE - NSCHARTNOTEFT_GEN_A_CORE
Paged by RN for fever of 102 prior to discharge - Patient asymptomatic, attending notified and advised infectious workup recommended  - Message left w/ Dr. Rogers  - Sending UA/ UCx, BCx, and CXR

## 2018-01-30 NOTE — PROGRESS NOTE ADULT - SUBJECTIVE AND OBJECTIVE BOX
NP Note discussed with  Primary Attending    Patient is a 88y old  Female who presents with a chief complaint of failure to thrive (20 Jan 2018 03:27)    INTERVAL HPI: Patient is a 88F from [facility], alert, non-ambulatory, with PMH Alzheimer's dementia, HTN, constipation, anxiety, dysphagia, heart disease, major depressive d/o, presenting with poor po intake and low BP in the NH. Patient was seen and examined, awake, does not follow commands, appears comfortable. Spoke to daughter Phil at bedside, who is HCP, states that she wants patient to be DNR and DNI, despite prior MOLST. ROS unobtainable from patient due to dementia. Pt was started on IVF and treated w/ ceftriaxone for a UTI. Surgery discussed PEG placement vs palliative for pt given FTT and family wishes to have PEG placed. Plavix held x 5 days for planned PEG tube.     Overnight events: No acute events. s/p PEG tolerating feeds. for dc back to NH.     MEDICATIONS  (STANDING):  ascorbic acid 500 milliGRAM(s) Oral daily  dextrose 5% 1000 milliLiter(s) (75 mL/Hr) IV Continuous <Continuous>  dextrose 5%. 1000 milliLiter(s) (50 mL/Hr) IV Continuous <Continuous>  dextrose 50% Injectable 12.5 Gram(s) IV Push once  dextrose 50% Injectable 25 Gram(s) IV Push once  dextrose 50% Injectable 25 Gram(s) IV Push once  docusate sodium 100 milliGRAM(s) Oral two times a day  ertapenem  IVPB      ertapenem  IVPB 1000 milliGRAM(s) IV Intermittent every 24 hours  insulin lispro (HumaLOG) corrective regimen sliding scale   SubCutaneous three times a day before meals  mirtazapine 15 milliGRAM(s) Oral at bedtime  senna 2 Tablet(s) Oral at bedtime  venlafaxine XR. 37.5 milliGRAM(s) Oral daily    MEDICATIONS  (PRN):  acetaminophen   Tablet 650 milliGRAM(s) Oral every 6 hours PRN mild pain 1-3  dextrose Gel 1 Dose(s) Oral once PRN Blood Glucose LESS THAN 70 milliGRAM(s)/deciliter  glucagon  Injectable 1 milliGRAM(s) IntraMuscular once PRN Glucose LESS THAN 70 milligrams/deciliter  sodium biphosphate Rectal Enema 1 Enema Rectal daily PRN constipation  __________________________________________________  REVIEW OF SYSTEMS:  deferred due to nonverbal pt    Vital Signs Last 24 Hrs  T(C): 36.6 (30 Jan 2018 14:42), Max: 37.2 (29 Jan 2018 20:17)  T(F): 97.8 (30 Jan 2018 14:42), Max: 99 (29 Jan 2018 20:17)  HR: 102 (30 Jan 2018 14:42) (93 - 102)  BP: 101/50 (30 Jan 2018 14:42) (101/50 - 123/57)  BP(mean): --  RR: 18 (30 Jan 2018 14:42) (16 - 18)  SpO2: 98% (30 Jan 2018 14:42) (95% - 98%)    ________________________________________________  PHYSICAL EXAM:  GENERAL: NAD  HEENT: Normocephalic;  conjunctivae and sclerae clear; moist mucous membranes;   NECK : supple  CHEST/LUNG: Clear to auscultation bilaterally anteriorly  HEART: S1 S2  regular; no murmurs, gallops or rubs  ABDOMEN: Soft, Nontender, Nondistended; Bowel sounds present +PEG tube  EXTREMITIES: no cyanosis; no edema; no calf tenderness  SKIN: warm and dry;  NERVOUS SYSTEM:  Awake and alert; nonverbal, no focal deficit  ________________________________________________  LABS:                        11.4   7.8   )-----------( 252      ( 30 Jan 2018 06:51 )             37.1       Ca    8.8        29 Jan 2018 07:11  Phos  2.7       30 Jan 2018 06:51  Mg     1.9       30 Jan 2018 06:51          RADIOLOGY & ADDITIONAL TESTS:    Imaging Personally Reviewed:  YES    Consultant(s) Notes Reviewed:   YES    Care Discussed with Consultants : YES

## 2018-01-30 NOTE — PROGRESS NOTE ADULT - ATTENDING COMMENTS
Events noted Patient  is  non verbal  awake  but  responsive  Patient  daughter  at bedside  treatment  for UTI in progress  ESBL complete  Ertapenem CXR possible  RLL Pneumonia no symptoms of  Respiratory infection at present  time    Failure to thrive  patient   Peg placement  done today start feeding as per GI   Dementia AT stable  Quadriplegia  supportive  care Mental status  is  stable Discharge  planning  Patient was scheduled to discharge  back to the  facility when noted with high fever  102    suspect Influenza Hold  discharge septic work up Rapid viral panel  symptomatic care

## 2018-01-30 NOTE — PROGRESS NOTE ADULT - PROBLEM SELECTOR PLAN 2
- Family wishing to have peg placed; plavix held x 5 days.  - PEg placed 1/29  - tolerating feeds    DC planning likely tomorrow (facility unable to accept patient today). Stable for dc am. - Family wishing to have peg placed; plavix held x 5 days.  - PEg placed 1/29  - tolerating feeds    DC planning today to [facility]

## 2018-01-31 LAB
GLUCOSE BLDC GLUCOMTR-MCNC: 109 MG/DL — HIGH (ref 70–99)
GLUCOSE BLDC GLUCOMTR-MCNC: 120 MG/DL — HIGH (ref 70–99)
GLUCOSE BLDC GLUCOMTR-MCNC: 137 MG/DL — HIGH (ref 70–99)
GLUCOSE BLDC GLUCOMTR-MCNC: 141 MG/DL — HIGH (ref 70–99)
HCT VFR BLD CALC: 38.5 % — SIGNIFICANT CHANGE UP (ref 34.5–45)
HGB BLD-MCNC: 12.4 G/DL — SIGNIFICANT CHANGE UP (ref 11.5–15.5)
MAGNESIUM SERPL-MCNC: 2.1 MG/DL — SIGNIFICANT CHANGE UP (ref 1.6–2.6)
MCHC RBC-ENTMCNC: 31.2 PG — SIGNIFICANT CHANGE UP (ref 27–34)
MCHC RBC-ENTMCNC: 32.1 GM/DL — SIGNIFICANT CHANGE UP (ref 32–36)
MCV RBC AUTO: 97.4 FL — SIGNIFICANT CHANGE UP (ref 80–100)
PHOSPHATE SERPL-MCNC: 2.5 MG/DL — SIGNIFICANT CHANGE UP (ref 2.5–4.5)
PLATELET # BLD AUTO: 261 K/UL — SIGNIFICANT CHANGE UP (ref 150–400)
RAPID RVP RESULT: SIGNIFICANT CHANGE UP
RBC # BLD: 3.96 M/UL — SIGNIFICANT CHANGE UP (ref 3.8–5.2)
RBC # FLD: 15 % — HIGH (ref 10.3–14.5)
WBC # BLD: 7 K/UL — SIGNIFICANT CHANGE UP (ref 3.8–10.5)
WBC # FLD AUTO: 7 K/UL — SIGNIFICANT CHANGE UP (ref 3.8–10.5)

## 2018-01-31 RX ADMIN — Medication 500 MILLIGRAM(S): at 12:13

## 2018-01-31 RX ADMIN — Medication 100 MILLIGRAM(S): at 06:32

## 2018-01-31 RX ADMIN — Medication 100 MILLIGRAM(S): at 18:39

## 2018-01-31 RX ADMIN — SENNA PLUS 2 TABLET(S): 8.6 TABLET ORAL at 21:54

## 2018-01-31 RX ADMIN — MIRTAZAPINE 15 MILLIGRAM(S): 45 TABLET, ORALLY DISINTEGRATING ORAL at 21:54

## 2018-01-31 RX ADMIN — Medication 37.5 MILLIGRAM(S): at 12:13

## 2018-01-31 NOTE — PROGRESS NOTE ADULT - SUBJECTIVE AND OBJECTIVE BOX
Patient is a 88y old  Female who presents with a chief complaint of failure to thrive (2018 16:29)      INTERVAL HPI/OVERNIGHT EVENTS:Patient  noted with fever  some chest congestion after  PEG placement  RVP negative v CXR worsening right LL infiltrate    Patient  is  non verbal no cough congestion  reported   T(C): 37.5 (18 @ 20:15), Max: 37.7 (18 @ 00:43)  HR: 96 (18 @ 20:15) (53 - 96)  BP: 110/57 (18 @ 20:15) (108/49 - 110/57)  RR: 16 (18 @ 20:15) (16 - 17)  SpO2: 95% (18 @ 20:15) (93% - 95%)  Wt(kg): --Vital Signs Last 24 Hrs  T(C): 37.5 (2018 20:15), Max: 37.7 (2018 00:43)  T(F): 99.5 (2018 20:15), Max: 99.8 (2018 00:43)  HR: 96 (2018 20:15) (53 - 96)  BP: 110/57 (2018 20:15) (108/49 - 110/57)  BP(mean): --  RR: 16 (2018 20:15) (16 - 17)  SpO2: 95% (2018 20:15) (93% - 95%)  I&O's Summary      LABS:                        12.4   7.0   )-----------( 261      ( 2018 07:11 )             38.5       Phos  2.5       Mg     2.1             Urinalysis Basic - ( 2018 20:18 )    Color: Yellow / Appearance: Clear / S.015 / pH: x  Gluc: x / Ketone: Trace  / Bili: Negative / Urobili: Negative   Blood: x / Protein: Negative / Nitrite: Negative   Leuk Esterase: Small / RBC: 2-5 /HPF / WBC 3-5 /HPF   Sq Epi: x / Non Sq Epi: x / Bacteria: Moderate /HPF      CAPILLARY BLOOD GLUCOSE      POCT Blood Glucose.: 120 mg/dL (2018 21:43)  POCT Blood Glucose.: 109 mg/dL (2018 16:37)  POCT Blood Glucose.: 137 mg/dL (2018 11:40)  POCT Blood Glucose.: 141 mg/dL (2018 08:07)        Urinalysis Basic - ( 2018 20:18 )    Color: Yellow / Appearance: Clear / S.015 / pH: x  Gluc: x / Ketone: Trace  / Bili: Negative / Urobili: Negative   Blood: x / Protein: Negative / Nitrite: Negative   Leuk Esterase: Small / RBC: 2-5 /HPF / WBC 3-5 /HPF   Sq Epi: x / Non Sq Epi: x / Bacteria: Moderate /HPF    MEDICATIONS  (STANDING):  ascorbic acid 500 milliGRAM(s) Oral daily  dextrose 5% 1000 milliLiter(s) (75 mL/Hr) IV Continuous <Continuous>  dextrose 5%. 1000 milliLiter(s) (50 mL/Hr) IV Continuous <Continuous>  dextrose 50% Injectable 12.5 Gram(s) IV Push once  dextrose 50% Injectable 25 Gram(s) IV Push once  dextrose 50% Injectable 25 Gram(s) IV Push once  docusate sodium 100 milliGRAM(s) Oral two times a day  insulin lispro (HumaLOG) corrective regimen sliding scale   SubCutaneous three times a day before meals  mirtazapine 15 milliGRAM(s) Oral at bedtime  senna 2 Tablet(s) Oral at bedtime  venlafaxine XR. 37.5 milliGRAM(s) Oral daily    MEDICATIONS  (PRN):  acetaminophen   Tablet 650 milliGRAM(s) Oral every 6 hours PRN mild pain 1-3  acetaminophen  Suppository 650 milliGRAM(s) Rectal every 6 hours PRN For Temp greater than 38 C (100.4 F)  dextrose Gel 1 Dose(s) Oral once PRN Blood Glucose LESS THAN 70 milliGRAM(s)/deciliter  glucagon  Injectable 1 milliGRAM(s) IntraMuscular once PRN Glucose LESS THAN 70 milligrams/deciliter  sodium biphosphate Rectal Enema 1 Enema Rectal daily PRN constipation    REVIEW OF SYSTEMS:  CONSTITUTIONAL: + fever, weight loss, + fatigue  EYES: No eye pain, visual disturbances, or discharge  ENMT:  No difficulty hearing, tinnitus, vertigo; No sinus or throat pain  NECK: No pain or stiffness  BREASTS: No pain, masses, or nipple discharge  RESPIRATORY: No cough, wheezing, chills or hemoptysis; No shortness of breath  CARDIOVASCULAR: No chest pain, palpitations, dizziness, or leg swelling  GASTROINTESTINAL: No abdominal or epigastric pain. No nausea, vomiting, or hematemesis; No diarrhea or constipation. No melena or hematochezia.  GENITOURINARY: No dysuria, frequency, hematuria, or incontinence  NEUROLOGICAL: No headaches, memory loss, loss of strength, numbness, or tremors  SKIN: No itching, burning, rashes, or lesions   LYMPH NODES: No enlarged glands  ENDOCRINE: No heat or cold intolerance; No hair loss  MUSCULOSKELETAL: No joint pain or swelling; No muscle, back, or extremity pain  PSYCHIATRIC: No depression, anxiety, mood swings, or difficulty sleeping  HEME/LYMPH: No easy bruising, or bleeding gums  ALLERGY AND IMMUNOLOGIC: No hives or eczema    RADIOLOGY & ADDITIONAL TESTS:    Imaging Personally Reviewed:  [ ] YES  [ ] NO    Consultant(s) Notes Reviewed:  [x ] YES  [ ] NO    PHYSICAL EXAM:  GENERAL:elderly lady NAD  on oxygen  supplement    HEAD:  Atraumatic, Normocephalic  EYES: EOMI, PERRLA, conjunctiva and sclera clear  ENMT: No tonsillar erythema, exudates, or enlargement; Moist mucous membranes, Good dentition, No lesions  NECK: Supple, No JVD, Normal thyroid  NERVOUS SYSTEM:  Alert  confused  non verbal   CHEST/LUNG: Clear to percussion bilaterally; No rales, rhonchi, wheezing, or rubs  HEART: Regular rate and rhythm; No murmurs, rubs, or gallops  ABDOMEN: Soft, Nontender, Nondistended; Bowel sounds present PEG   EXTREMITIES:  2+ Peripheral Pulses, No clubbing, cyanosis, or edema  LYMPH: No lymphadenopathy noted  SKIN: No rashes or lesions    Care Discussed with Consultants/Other Providers [ x] YES  [ ] NO

## 2018-01-31 NOTE — PROGRESS NOTE ADULT - PROBLEM SELECTOR PLAN 1
1.  Dr. Rogers recommended Zosyn 3.375mg IV Q8  2.   to contact [facility] to determine if it would be covered by facility  3.  If approved insert PICC line  4.  Dr. Taylor informed

## 2018-01-31 NOTE — PROGRESS NOTE ADULT - ATTENDING COMMENTS
Events noted Patient  is  non verbal  awake  but  responsive  Patient  daughter  at bedside  treatment  for UTI in progress  ESBL complete  Ertapenem CXR possible  RLL Pneumonia no symptoms of  Respiratory infection at present  time    Failure to thrive  patient   Peg placement  done today start feeding as per GI   Dementia AT stable  Quadriplegia  supportive  care Mental status  is  stable Discharge  planning  Patient was scheduled to discharge  back to the  facility when noted with high fever  102  discharged placed  on hold  Patient  is  continue  to have  fever  Abnormal CXR  r/o Pneumonia  start IV Doxycycline  ID f/u

## 2018-01-31 NOTE — PROGRESS NOTE ADULT - SUBJECTIVE AND OBJECTIVE BOX
NP Note discussed with  Primary Attending: Late entry    Patient is a 88y old  Female who presents with a chief complaint of failure to thrive (2018 16:29)      INTERVAL HPI/OVERNIGHT EVENTS:  Due to elevated temperature sepsis workup revealed slight worsening of right lower lobe opacity.  Dr. Rogers, ID consulted.    MEDICATIONS  (STANDING):  ascorbic acid 500 milliGRAM(s) Oral daily  clopidogrel Tablet 75 milliGRAM(s) Oral daily  dextrose 5% 1000 milliLiter(s) (75 mL/Hr) IV Continuous <Continuous>  dextrose 5%. 1000 milliLiter(s) (50 mL/Hr) IV Continuous <Continuous>  dextrose 50% Injectable 12.5 Gram(s) IV Push once  dextrose 50% Injectable 25 Gram(s) IV Push once  dextrose 50% Injectable 25 Gram(s) IV Push once  docusate sodium 100 milliGRAM(s) Oral two times a day  doxycycline IVPB      doxycycline IVPB 100 milliGRAM(s) IV Intermittent every 12 hours  insulin lispro (HumaLOG) corrective regimen sliding scale   SubCutaneous three times a day before meals  mirtazapine 15 milliGRAM(s) Oral at bedtime  senna 2 Tablet(s) Oral at bedtime  venlafaxine XR. 37.5 milliGRAM(s) Oral daily    MEDICATIONS  (PRN):  acetaminophen   Tablet 650 milliGRAM(s) Oral every 6 hours PRN mild pain 1-3  acetaminophen  Suppository 650 milliGRAM(s) Rectal every 6 hours PRN For Temp greater than 38 C (100.4 F)  dextrose Gel 1 Dose(s) Oral once PRN Blood Glucose LESS THAN 70 milliGRAM(s)/deciliter  glucagon  Injectable 1 milliGRAM(s) IntraMuscular once PRN Glucose LESS THAN 70 milligrams/deciliter  sodium biphosphate Rectal Enema 1 Enema Rectal daily PRN constipation      __________________________________________________  REVIEW OF SYSTEMS:  Constitutional:  No discomfort i.e., grimacing noted.  Demented  GI: NPO; no vomiting      Vital Signs Last 24 Hrs  T(C): 36.9 (2018 05:24), Max: 37.5 (2018 20:15)  T(F): 98.4 (2018 05:24), Max: 99.5 (2018 20:15)  HR: 77 (2018 05:24) (53 - 96)  BP: 112/54 (2018 05:24) (108/49 - 112/54)  BP(mean): --  RR: 14 (2018 05:24) (14 - 17)  SpO2: 94% (2018 05:24) (94% - 95%)    ________________________________________________  PHYSICAL EXAM:  GENERAL: Sleeping easily aroused.  Smiling.  HEENT: Normocephalic;  conjunctivae and sclerae clear; moist mucous membranes;   NECK : supple no JVD or LAD  CHEST/LUNG: Fair AE  HEART: S1 S2  regular; no murmurs, gallops or rubs  ABDOMEN: Soft, Nontender, Nondistended; Bowel sounds present  EXTREMITIES: no cyanosis; no edema; no calf tenderness, contracted b/le  SKIN: warm and dry; no rash  NERVOUS SYSTEM:  Awake and alert    _________________________________________________  LABS:                        12.4   7.0   )-----------( 261      ( 2018 07:11 )             38.5       Phos  2.5       Mg     2.1         Urinalysis Basic - ( 2018 20:18 )  Color: Yellow / Appearance: Clear / S.015 / pH: x  Gluc: x / Ketone: Trace  / Bili: Negative / Urobili: Negative   Blood: x / Protein: Negative / Nitrite: Negative   Leuk Esterase: Small / RBC: 2-5 /HPF / WBC 3-5 /HPF   Sq Epi: x / Non Sq Epi: x / Bacteria: Moderate /HPF    CAPILLARY BLOOD GLUCOSE  POCT Blood Glucose.: 120 mg/dL (2018 07:50)  POCT Blood Glucose.: 120 mg/dL (2018 21:43)  POCT Blood Glucose.: 109 mg/dL (2018 16:37)  POCT Blood Glucose.: 137 mg/dL (2018 11:40)    RADIOLOGY & ADDITIONAL TESTS:  Imaging Personally Reviewed:  YES  Consultant(s) Notes Reviewed:   YES    Plan of care was discussed with patient and /or primary care giver; all questions and concerns were addressed and care was aligned with patient's wishes.

## 2018-01-31 NOTE — PROGRESS NOTE ADULT - PROBLEM SELECTOR PLAN 4
Spoke with patient's daughter, Phil Johnston ([phone]) on the phone. Discussed status. Aware of benefits vs burdens of artificial nutrition - wants to continue with planned PEG placement. DNR / DNI on file - reviewed MOLST: updated to include long term feeding tube, trial period of IV fluids, use antibiotics. Reports it is okay to discuss medical information with her son, Jim. MOLST completed as per daughter's wishes. All questions answered.
1.  Fall prevention  2.  Supportive care
c/w venlafaxine, remeron
turn and position q2hrs and PRN

## 2018-01-31 NOTE — PROGRESS NOTE ADULT - PROBLEM SELECTOR PROBLEM 4
Palliative care encounter
Dementia without behavioral disturbance, unspecified dementia type
Depression
Functional quadriplegia

## 2018-02-01 DIAGNOSIS — J69.0 PNEUMONITIS DUE TO INHALATION OF FOOD AND VOMIT: ICD-10-CM

## 2018-02-01 DIAGNOSIS — J18.9 PNEUMONIA, UNSPECIFIED ORGANISM: ICD-10-CM

## 2018-02-01 DIAGNOSIS — F03.90 UNSPECIFIED DEMENTIA WITHOUT BEHAVIORAL DISTURBANCE: ICD-10-CM

## 2018-02-01 DIAGNOSIS — F32.9 MAJOR DEPRESSIVE DISORDER, SINGLE EPISODE, UNSPECIFIED: ICD-10-CM

## 2018-02-01 LAB
ANION GAP SERPL CALC-SCNC: 9 MMOL/L — SIGNIFICANT CHANGE UP (ref 5–17)
BUN SERPL-MCNC: 12 MG/DL — SIGNIFICANT CHANGE UP (ref 7–18)
CALCIUM SERPL-MCNC: 8.4 MG/DL — SIGNIFICANT CHANGE UP (ref 8.4–10.5)
CHLORIDE SERPL-SCNC: 105 MMOL/L — SIGNIFICANT CHANGE UP (ref 96–108)
CO2 SERPL-SCNC: 25 MMOL/L — SIGNIFICANT CHANGE UP (ref 22–31)
CREAT SERPL-MCNC: 0.52 MG/DL — SIGNIFICANT CHANGE UP (ref 0.5–1.3)
GLUCOSE BLDC GLUCOMTR-MCNC: 101 MG/DL — HIGH (ref 70–99)
GLUCOSE BLDC GLUCOMTR-MCNC: 105 MG/DL — HIGH (ref 70–99)
GLUCOSE BLDC GLUCOMTR-MCNC: 120 MG/DL — HIGH (ref 70–99)
GLUCOSE BLDC GLUCOMTR-MCNC: 135 MG/DL — HIGH (ref 70–99)
GLUCOSE SERPL-MCNC: 134 MG/DL — HIGH (ref 70–99)
POTASSIUM SERPL-MCNC: 3.8 MMOL/L — SIGNIFICANT CHANGE UP (ref 3.5–5.3)
POTASSIUM SERPL-SCNC: 3.8 MMOL/L — SIGNIFICANT CHANGE UP (ref 3.5–5.3)
SODIUM SERPL-SCNC: 139 MMOL/L — SIGNIFICANT CHANGE UP (ref 135–145)

## 2018-02-01 RX ORDER — PIPERACILLIN AND TAZOBACTAM 4; .5 G/20ML; G/20ML
3.38 INJECTION, POWDER, LYOPHILIZED, FOR SOLUTION INTRAVENOUS EVERY 8 HOURS
Qty: 0 | Refills: 0 | Status: DISCONTINUED | OUTPATIENT
Start: 2018-02-01 | End: 2018-02-02

## 2018-02-01 RX ORDER — PIPERACILLIN AND TAZOBACTAM 4; .5 G/20ML; G/20ML
3.38 INJECTION, POWDER, LYOPHILIZED, FOR SOLUTION INTRAVENOUS ONCE
Qty: 0 | Refills: 0 | Status: COMPLETED | OUTPATIENT
Start: 2018-02-01 | End: 2018-02-01

## 2018-02-01 RX ADMIN — PIPERACILLIN AND TAZOBACTAM 25 GRAM(S): 4; .5 INJECTION, POWDER, LYOPHILIZED, FOR SOLUTION INTRAVENOUS at 14:43

## 2018-02-01 RX ADMIN — Medication 260 MILLIGRAM(S): at 06:46

## 2018-02-01 RX ADMIN — MIRTAZAPINE 15 MILLIGRAM(S): 45 TABLET, ORALLY DISINTEGRATING ORAL at 22:25

## 2018-02-01 RX ADMIN — Medication 260 MILLIGRAM(S): at 00:11

## 2018-02-01 RX ADMIN — Medication 100 MILLIGRAM(S): at 17:23

## 2018-02-01 RX ADMIN — SENNA PLUS 2 TABLET(S): 8.6 TABLET ORAL at 22:25

## 2018-02-01 RX ADMIN — Medication 100 MILLIGRAM(S): at 06:18

## 2018-02-01 RX ADMIN — Medication 500 MILLIGRAM(S): at 11:59

## 2018-02-01 RX ADMIN — CLOPIDOGREL BISULFATE 75 MILLIGRAM(S): 75 TABLET, FILM COATED ORAL at 11:59

## 2018-02-01 RX ADMIN — Medication 37.5 MILLIGRAM(S): at 11:59

## 2018-02-01 RX ADMIN — PIPERACILLIN AND TAZOBACTAM 200 GRAM(S): 4; .5 INJECTION, POWDER, LYOPHILIZED, FOR SOLUTION INTRAVENOUS at 10:33

## 2018-02-01 RX ADMIN — PIPERACILLIN AND TAZOBACTAM 25 GRAM(S): 4; .5 INJECTION, POWDER, LYOPHILIZED, FOR SOLUTION INTRAVENOUS at 22:24

## 2018-02-01 NOTE — PROGRESS NOTE ADULT - PROBLEM SELECTOR PROBLEM 1
Late onset Alzheimer's disease without behavioral disturbance
Aspiration pneumonia of right lower lobe, unspecified aspiration pneumonia type
ESBL (extended spectrum beta-lactamase) producing bacteria infection
ESBL (extended spectrum beta-lactamase) producing bacteria infection
HCAP (healthcare-associated pneumonia)
UTI (urinary tract infection)
Urinary tract infection with hematuria, site unspecified
ESBL (extended spectrum beta-lactamase) producing bacteria infection
ESBL (extended spectrum beta-lactamase) producing bacteria infection

## 2018-02-01 NOTE — PROGRESS NOTE ADULT - PROBLEM SELECTOR PROBLEM 3
Functional quadriplegia
Depression, unspecified depression type
Failure to thrive in adult
Hypertension
Severe protein-calorie malnutrition

## 2018-02-01 NOTE — PROGRESS NOTE ADULT - PROBLEM SELECTOR PROBLEM 2
Severe protein-calorie malnutrition
Failure to thrive in adult
Urinary tract infection with hematuria, site unspecified
Failure to thrive in adult
Failure to thrive in adult

## 2018-02-01 NOTE — PROGRESS NOTE ADULT - ATTENDING COMMENTS
Events noted Patient  is  non verbal  awake  but  responsive  Patient  daughter  at bedside  treatment  for UTI in progress  ESBL complete  Ertapenem CXR possible  RLL Pneumonia no symptoms of  Respiratory infection at present  time    Failure to thrive  patient   Peg placement  done today start feeding as per GI   Dementia AT stable  Quadriplegia  supportive  care Mental status  is  stable Discharge  planning  Patient was scheduled to discharge  back to the  facility when noted with high fever  102    suspect Influenza Hold  discharge septic work up Rapid viral panel  symptomatic care Events noted Patient  is  non verbal  awake  but  responsive  Patient  daughter  at bedside  treatment  for UTI in progress  ESBL complete  Ertapenem CXR possible  RLL Pneumonia worsening infiltrate patient  spiked fever  for past 2 days started  on doxycycline  but  changed to Zosyn by ID    Need Picc line    Failure to thrive  patient   Peg placement   Tolerated  GT feeding well    Dementia AT stable  Quadriplegia  supportive  care Mental status  is  stable Discharge  planning after  PICC line placed    Patient was scheduled to discharge  back to the  facility when noted with high fever  102

## 2018-02-01 NOTE — PROGRESS NOTE ADULT - SUBJECTIVE AND OBJECTIVE BOX
Subjective: was scheduled for discharge but held since spiked temp to 102. pt pancultured . cxr with worsening right lower lobe opacity . started on zosyn .       PHYSICAL EXAM:    Vital Signs Last 24 Hrs  T(C): 36.9 (2018 05:24), Max: 37.5 (2018 20:15)  T(F): 98.4 (2018 05:24), Max: 99.5 (2018 20:15)  HR: 77 (2018 05:24) (77 - 96)  BP: 112/54 (2018 05:24) (110/57 - 112/54)  BP(mean): --  RR: 14 (2018 05:24) (14 - 16)  SpO2: 94% (2018 05:24) (94% - 95%)    PHYSICAL EXAM:  GENERAL: NAD, non verbal  CHEST/LUNG: right basilar rales   HEART: S1 S2  regular; no murmurs, gallops or rubs  ABDOMEN: Soft, Nontender, Nondistended; Bowel sounds present  EXTREMITIES: no cyanosis; no edema; no calf tenderness  SKIN: warm and dry; no rash  NERVOUS SYSTEM:  moving all xtremities         LABS/DIAGNOSTIC TESTS                        12.4   7.0   )-----------( 261      ( 2018 07:11 )             38.5         139  |  105  |  12  ----------------------------<  134<H>  3.8   |  25  |  0.52    Ca    8.4      2018 09:51  Phos  2.5       Mg     2.1             Urinalysis Basic - ( 2018 20:18 )    Color: Yellow / Appearance: Clear / S.015 / pH: x  Gluc: x / Ketone: Trace  / Bili: Negative / Urobili: Negative   Blood: x / Protein: Negative / Nitrite: Negative   Leuk Esterase: Small / RBC: 2-5 /HPF / WBC 3-5 /HPF   Sq Epi: x / Non Sq Epi: x / Bacteria: Moderate /HPF        meds   acetaminophen   Tablet 650 milliGRAM(s) Oral every 6 hours PRN  acetaminophen  Suppository 650 milliGRAM(s) Rectal every 6 hours PRN  ascorbic acid 500 milliGRAM(s) Oral daily  clopidogrel Tablet 75 milliGRAM(s) Oral daily  dextrose 5% 1000 milliLiter(s) IV Continuous <Continuous>  dextrose 5%. 1000 milliLiter(s) IV Continuous <Continuous>  dextrose 50% Injectable 12.5 Gram(s) IV Push once  dextrose 50% Injectable 25 Gram(s) IV Push once  dextrose 50% Injectable 25 Gram(s) IV Push once  dextrose Gel 1 Dose(s) Oral once PRN  docusate sodium 100 milliGRAM(s) Oral two times a day  glucagon  Injectable 1 milliGRAM(s) IntraMuscular once PRN  insulin lispro (HumaLOG) corrective regimen sliding scale   SubCutaneous three times a day before meals  mirtazapine 15 milliGRAM(s) Oral at bedtime  piperacillin/tazobactam IVPB. 3.375 Gram(s) IV Intermittent every 8 hours  senna 2 Tablet(s) Oral at bedtime  sodium biphosphate Rectal Enema 1 Enema Rectal daily PRN  venlafaxine XR. 37.5 milliGRAM(s) Oral daily        CULTURES  Culture Results: blood  No growth to date. ( @ 10:32)  Culture Results: urine   50,000 - 99,000 CFU/mL Enterococcus faecium ( @ 10:10)        RADIOLOGY  < from: Xray Chest 1 View- PORTABLE-Urgent (18 @ 20:00) >    EXAM:  XR CHEST PORTABLE URGENT 1V                            PROCEDURE DATE:  2018          INTERPRETATION:  PORTABLE CHEST X-RAY    HISTORY: fever.    COMPARISON: 2018.    FINDINGS:  Reverse lordotic view.    Slight worsening of right lower lung opacity. There are milder left   basilar opacities.  No pneumothorax or pleural effusion.   The cardiac silhouette is not accurately assessed by AP technique. Aortic   calcifications.    IMPRESSION:  Slight worsening of right lower lung opacity. Milder left basilar   opacities.    < end of copied text >

## 2018-02-01 NOTE — PROGRESS NOTE ADULT - SUBJECTIVE AND OBJECTIVE BOX
NP Note discussed with  Primary Attending    Patient is a 88y old  Female who presents with a chief complaint of failure to thrive (20 Jan 2018 03:27)    INTERVAL HPI: Patient is a 88F from [facility], alert, non-ambulatory, with PMH Alzheimer's dementia, HTN, constipation, anxiety, dysphagia, heart disease, major depressive d/o, presenting with poor po intake and low BP in the NH. Patient was seen and examined, awake, does not follow commands, appears comfortable. Spoke to daughter Phil at bedside, who is HCP, states that she wants patient to be DNR and DNI, despite prior MOLST. ROS unobtainable from patient due to dementia. Pt was started on IVF and treated w/ ceftriaxone for a UTI. Surgery discussed PEG placement vs palliative for pt given FTT and family wishes to have PEG placed. S/p PEG on 1/30. Found febrile, now with RLL HCAP.     Overnight events: No acute events. afebrile x24hrs. Started on Zosyn today.    MEDICATIONS  (STANDING):  ascorbic acid 500 milliGRAM(s) Oral daily  dextrose 5% 1000 milliLiter(s) (75 mL/Hr) IV Continuous <Continuous>  dextrose 5%. 1000 milliLiter(s) (50 mL/Hr) IV Continuous <Continuous>  dextrose 50% Injectable 12.5 Gram(s) IV Push once  dextrose 50% Injectable 25 Gram(s) IV Push once  dextrose 50% Injectable 25 Gram(s) IV Push once  docusate sodium 100 milliGRAM(s) Oral two times a day  ertapenem  IVPB      ertapenem  IVPB 1000 milliGRAM(s) IV Intermittent every 24 hours  insulin lispro (HumaLOG) corrective regimen sliding scale   SubCutaneous three times a day before meals  mirtazapine 15 milliGRAM(s) Oral at bedtime  senna 2 Tablet(s) Oral at bedtime  venlafaxine XR. 37.5 milliGRAM(s) Oral daily    MEDICATIONS  (PRN):  acetaminophen   Tablet 650 milliGRAM(s) Oral every 6 hours PRN mild pain 1-3  dextrose Gel 1 Dose(s) Oral once PRN Blood Glucose LESS THAN 70 milliGRAM(s)/deciliter  glucagon  Injectable 1 milliGRAM(s) IntraMuscular once PRN Glucose LESS THAN 70 milligrams/deciliter  sodium biphosphate Rectal Enema 1 Enema Rectal daily PRN constipation  __________________________________________________  REVIEW OF SYSTEMS:  deferred due to nonverbal pt    Vital Signs Last 24 Hrs  T(C): 36.9 (01 Feb 2018 05:24), Max: 37.5 (31 Jan 2018 20:15)  T(F): 98.4 (01 Feb 2018 05:24), Max: 99.5 (31 Jan 2018 20:15)  HR: 77 (01 Feb 2018 05:24) (53 - 96)  BP: 112/54 (01 Feb 2018 05:24) (108/49 - 112/54)  BP(mean): --  RR: 14 (01 Feb 2018 05:24) (14 - 17)  SpO2: 94% (01 Feb 2018 05:24) (94% - 95%)    ________________________________________________  PHYSICAL EXAM:  GENERAL: NAD  HEENT: Normocephalic;  conjunctivae and sclerae clear; moist mucous membranes;   NECK : supple  CHEST/LUNG: Clear to auscultation bilaterally anteriorly, diminished at bases  HEART: S1 S2  regular; no murmurs, gallops or rubs  ABDOMEN: Soft, Nontender, Nondistended; Bowel sounds present +PEG tube  EXTREMITIES: no cyanosis; no edema; no calf tenderness  SKIN: warm and dry;  NERVOUS SYSTEM:  Awake and alert; nonverbal, no focal deficit  ________________________________________________  LABS:    01 Feb 2018 09:51    139    |  105    |  12     ----------------------------<  134    3.8     |  25     |  0.52     Ca    8.4        01 Feb 2018 09:51      Culture - Blood (collected 31 Jan 2018 10:32)  Source: .Blood Blood  Preliminary Report (01 Feb 2018 11:01):    No growth to date.      RADIOLOGY & ADDITIONAL TESTS:    Imaging Personally Reviewed:  YES    Consultant(s) Notes Reviewed:   YES, ID NP Note discussed with  Primary Attending    Patient is a 88y old  Female who presents with a chief complaint of failure to thrive (20 Jan 2018 03:27)    INTERVAL HPI: Patient is a 88F from [facility], alert, non-ambulatory, with PMH Alzheimer's dementia, HTN, constipation, anxiety, dysphagia, heart disease, major depressive d/o, presenting with poor po intake and low BP in the NH. Patient was seen and examined, awake, does not follow commands, appears comfortable. Spoke to daughter Phil at bedside, who is HCP, states that she wants patient to be DNR and DNI, despite prior MOLST. ROS unobtainable from patient due to dementia. Pt was started on IVF and treated w/ ceftriaxone for a UTI. Surgery discussed PEG placement vs palliative for pt given FTT and family wishes to have PEG placed. S/p PEG on 1/30. Found febrile, now with RLL HCAP.     Overnight events: No acute events. afebrile x24hrs. Started on Zosyn today.    MEDICATIONS  (STANDING):  ascorbic acid 500 milliGRAM(s) Oral daily  dextrose 5% 1000 milliLiter(s) (75 mL/Hr) IV Continuous <Continuous>  dextrose 5%. 1000 milliLiter(s) (50 mL/Hr) IV Continuous <Continuous>  dextrose 50% Injectable 12.5 Gram(s) IV Push once  dextrose 50% Injectable 25 Gram(s) IV Push once  dextrose 50% Injectable 25 Gram(s) IV Push once  docusate sodium 100 milliGRAM(s) Oral two times a day  ertapenem  IVPB      ertapenem  IVPB 1000 milliGRAM(s) IV Intermittent every 24 hours  insulin lispro (HumaLOG) corrective regimen sliding scale   SubCutaneous three times a day before meals  mirtazapine 15 milliGRAM(s) Oral at bedtime  senna 2 Tablet(s) Oral at bedtime  venlafaxine XR. 37.5 milliGRAM(s) Oral daily    MEDICATIONS  (PRN):  acetaminophen   Tablet 650 milliGRAM(s) Oral every 6 hours PRN mild pain 1-3  dextrose Gel 1 Dose(s) Oral once PRN Blood Glucose LESS THAN 70 milliGRAM(s)/deciliter  glucagon  Injectable 1 milliGRAM(s) IntraMuscular once PRN Glucose LESS THAN 70 milligrams/deciliter  sodium biphosphate Rectal Enema 1 Enema Rectal daily PRN constipation  __________________________________________________  REVIEW OF SYSTEMS:  deferred due to nonverbal pt    Vital Signs Last 24 Hrs  T(C): 36.9 (01 Feb 2018 05:24), Max: 37.5 (31 Jan 2018 20:15)  T(F): 98.4 (01 Feb 2018 05:24), Max: 99.5 (31 Jan 2018 20:15)  HR: 77 (01 Feb 2018 05:24) (53 - 96)  BP: 112/54 (01 Feb 2018 05:24) (108/49 - 112/54)  BP(mean): --  RR: 14 (01 Feb 2018 05:24) (14 - 17)  SpO2: 94% (01 Feb 2018 05:24) (94% - 95%)    ________________________________________________  PHYSICAL EXAM:  GENERAL: NAD  HEENT: Normocephalic;  conjunctivae and sclerae clear; moist mucous membranes;   NECK : supple  CHEST/LUNG: Clear to auscultation bilaterally anteriorly, diminished at bases  HEART: S1 S2  regular; no murmurs, gallops or rubs  ABDOMEN: Soft, Nontender, Nondistended; Bowel sounds present +PEG tube  EXTREMITIES: no cyanosis; no edema; no calf tenderness  SKIN: warm and dry;  NERVOUS SYSTEM:  Awake and alert; nonverbal, no focal deficit  ________________________________________________  LABS:    01 Feb 2018 09:51    139    |  105    |  12     ----------------------------<  134    3.8     |  25     |  0.52     Ca    8.4        01 Feb 2018 09:51      Culture - Blood (collected 31 Jan 2018 10:32)  Source: .Blood Blood  Preliminary Report (01 Feb 2018 11:01):    No growth to date.      RADIOLOGY & ADDITIONAL TESTS:    Imaging Personally Reviewed:  YES    Consultant(s) Notes Reviewed:   YES, ID        Left msg for daughter for call back to discuss plan of care.

## 2018-02-01 NOTE — PROGRESS NOTE ADULT - PROBLEM SELECTOR PLAN 1
-started on Zosyn Today  - cxr with RLL worsening opacity  - afebrile x24hrs  - ID follow up dr Rogers -started on Zosyn Today 1/7  - cxr with RLL worsening opacity  - afebrile x24hrs  - ID follow up dr Rogers  - for PICC line

## 2018-02-01 NOTE — PROGRESS NOTE ADULT - PROBLEM SELECTOR PLAN 3
- Family wishing to have peg placed; plavix held x 5 days.  - PEg placed 1/29  - tolerating feeds    DC planning today to [facility] - Family wishing to have peg placed; plavix held x 5 days.  - PEg placed 1/29  - tolerating feeds    DC planning today to [facility] once PICC placed.

## 2018-02-01 NOTE — PROGRESS NOTE ADULT - ASSESSMENT
1.  Worsening RLL opacity - PNA
88F w/dementia and dysphagia s/p PEG placement yesterday.  -PEG looks good  -meds started yesterday  -feeds started today w/o issue  -would restart Plavix tomorrow  -please reconsult prn
Patient is a 88F from [facility], alert, non-ambulatory, with PMH Alzheimer's dementia, HTN, constipation, anxiety, dysphagia, heart disease, major depressive d/o, presenting with poor po intake and low BP in the NH. Admitted for UTI, dehydration and failure to thrive.
Patient is a 88F from [facility], alert, non-ambulatory, with PMH Alzheimer's dementia, HTN, constipation, anxiety, dysphagia, heart disease, major depressive d/o, presenting with poor po intake, hypernatremia and hypokalemia  along with  low BP in the NH. Patient was seen and examined, awake, does not follow commands, appears comfortable. Spoke to daughter Phil at bedside, who is HCP, states that she wants patient to be DNR and DNI, despite prior MOLST. ROS unobtainable from patient due to dementia. afebrile on adm with stable bp , u/a pos , cxr pos for RLL pna    # ESBL klebsiella uti     # RLL pna     PLAN  blood cx times 2 f/u ( neg )   cont  with ertapenem 1000mg q 24 hours for 7 days   for peg     thnx will f/u   d/w pa
Patient is a 88F from [facility], alert, non-ambulatory, with PMH Alzheimer's dementia, HTN, constipation, anxiety, dysphagia, heart disease, major depressive d/o, presenting with poor po intake and low BP in the NH. Admitted for UTI, dehydration and failure to thrive.
Patient is a 88F from [facility], alert, non-ambulatory, with PMH Alzheimer's dementia, HTN, constipation, anxiety, dysphagia, heart disease, major depressive d/o, presenting with poor po intake and low BP in the NH. Admitted for UTI, dehydration and failure to thrive, s/p PEG placement, now with HCAP.
Patient is a 88F from [facility], alert, non-ambulatory, with PMH Alzheimer's dementia, HTN, constipation, anxiety, dysphagia, heart disease, major depressive d/o, presenting with poor po intake, hypernatremia and hypokalemia  along with  low BP in the NH. Patient was seen and examined, awake, does not follow commands, appears comfortable. Spoke to daughter Phil at bedside, who is HCP, states that she wants patient to be DNR and DNI, despite prior MOLST. ROS unobtainable from patient due to dementia. afebrile on adm with stable bp , u/a pos , cxr pos for RLL pna    # ESBL klebsiella uti     # RLL pna     PLAN  blood cx times 2 f/u ( neg )   cont  with ertapenem 1000mg q 24 hours for 7 days ( 2 more days )   for peg     thnx will f/u   d/w pa
Patient is a 88F from [facility], alert, non-ambulatory, with PMH Alzheimer's dementia, HTN, constipation, anxiety, dysphagia, heart disease, major depressive d/o, presenting with poor po intake and low BP in the NH. Admitted for UTI, dehydration and failure to thrive.
Patient is a 88F from [facility], alert, non-ambulatory, with PMH Alzheimer's dementia, HTN, constipation, anxiety, dysphagia, heart disease, major depressive d/o, presenting with poor po intake, hypernatremia and hypokalemia  along with  low BP in the NH. Patient was seen and examined, awake, does not follow commands, appears comfortable. Spoke to daughter Phil at bedside, who is HCP, states that she wants patient to be DNR and DNI, despite prior MOLST. ROS unobtainable from patient due to dementia. afebrile on adm with stable bp , u/a pos , cxr pos for RLL pna    # ESBL klebsiella uti     # RLL pna     PLAN  blood cx times 2 f/u ( neg )   cont  with ertapenem 1000mg q 24 hours for 7 days ( 3 more days )   for peg     thnx will f/u   d/w pa
Patient is a 88F from [facility], alert, non-ambulatory, with PMH Alzheimer's dementia, HTN, constipation, anxiety, dysphagia, heart disease, major depressive d/o, presenting with poor po intake, hypernatremia and hypokalemia  along with  low BP in the NH. Patient was seen and examined, awake, does not follow commands, appears comfortable. Spoke to daughter Phil at bedside, who is HCP, states that she wants patient to be DNR and DNI, despite prior MOLST. ROS unobtainable from patient due to dementia. afebrile on adm with stable bp , u/a pos , cxr pos for RLL pna    # ESBL klebsiella uti     # RLL pna     PLAN  completed course of ab       stable for d/c from id pt of view   d/w pa
Patient is a 88F from [facility], alert, non-ambulatory, with PMH Alzheimer's dementia, HTN, constipation, anxiety, dysphagia, heart disease, major depressive d/o, presenting with poor po intake and low BP in the NH. Admitted for UTI, dehydration and failure to thrive, peg placed yesterday. Started PEG feeds today. for dc back to NH, facility unable to accept today, dc delayed until tomorrow.
Patient is a 88F from [facility], alert, non-ambulatory, with PMH Alzheimer's dementia, HTN, constipation, anxiety, dysphagia, heart disease, major depressive d/o, presenting with poor po intake, hypernatremia and hypokalemia  along with  low BP in the NH. Patient was seen and examined, awake, does not follow commands, appears comfortable. Spoke to daughter Phil at bedside, who is HCP, states that she wants patient to be DNR and DNI, despite prior MOLST. ROS unobtainable from patient due to dementia. afebrile on adm with stable bp , u/a pos , cxr pos for RLL pna    # ESBL klebsiella uti     # RLL pna     # spiking temps likely sec to worsening asp pna . cxr with worsening RLL pna     PLAN  start zosyn for 7 days which she can get at nh   picc line /midline for completion of ab        stable for d/c from id pt of view   d/w pa
Patient is a 88F from [facility], alert, non-ambulatory, with PMH Alzheimer's dementia, HTN, constipation, anxiety, dysphagia, heart disease, major depressive d/o, presenting with poor po intake and low BP in the NH. Admitted for UTI, dehydration and failure to thrive now awaiting PEg placement
Patient is a 88F from [facility], alert, non-ambulatory, with PMH Alzheimer's dementia, HTN, constipation, anxiety, dysphagia, heart disease, major depressive d/o, presenting with poor po intake and low BP in the NH. Admitted for UTI, dehydration and failure to thrive.
Patient is a 88F from [facility], alert, non-ambulatory, with PMH Alzheimer's dementia, HTN, constipation, anxiety, dysphagia, heart disease, major depressive d/o, presenting with poor po intake and low BP in the NH. Admitted for UTI, dehydration and failure to thrive.
Patient is a 88F from [facility], alert, non-ambulatory, with PMH Alzheimer's dementia, HTN, constipation, anxiety, dysphagia, heart disease, major depressive d/o, presenting with poor po intake and low BP in the NH. Admitted for UTI, dehydration and failure to thrive.

## 2018-02-02 VITALS
DIASTOLIC BLOOD PRESSURE: 49 MMHG | SYSTOLIC BLOOD PRESSURE: 103 MMHG | HEART RATE: 77 BPM | TEMPERATURE: 99 F | OXYGEN SATURATION: 98 % | RESPIRATION RATE: 14 BRPM

## 2018-02-02 LAB
-  AMPICILLIN: SIGNIFICANT CHANGE UP
-  CIPROFLOXACIN: SIGNIFICANT CHANGE UP
-  NITROFURANTOIN: SIGNIFICANT CHANGE UP
-  TETRACYCLINE: SIGNIFICANT CHANGE UP
-  VANCOMYCIN: SIGNIFICANT CHANGE UP
ANION GAP SERPL CALC-SCNC: 9 MMOL/L — SIGNIFICANT CHANGE UP (ref 5–17)
APTT BLD: 32.4 SEC — SIGNIFICANT CHANGE UP (ref 27.5–37.4)
BUN SERPL-MCNC: 10 MG/DL — SIGNIFICANT CHANGE UP (ref 7–18)
CALCIUM SERPL-MCNC: 8.5 MG/DL — SIGNIFICANT CHANGE UP (ref 8.4–10.5)
CHLORIDE SERPL-SCNC: 104 MMOL/L — SIGNIFICANT CHANGE UP (ref 96–108)
CO2 SERPL-SCNC: 26 MMOL/L — SIGNIFICANT CHANGE UP (ref 22–31)
CREAT SERPL-MCNC: 0.5 MG/DL — SIGNIFICANT CHANGE UP (ref 0.5–1.3)
CULTURE RESULTS: SIGNIFICANT CHANGE UP
GLUCOSE BLDC GLUCOMTR-MCNC: 100 MG/DL — HIGH (ref 70–99)
GLUCOSE BLDC GLUCOMTR-MCNC: 87 MG/DL — SIGNIFICANT CHANGE UP (ref 70–99)
GLUCOSE BLDC GLUCOMTR-MCNC: 90 MG/DL — SIGNIFICANT CHANGE UP (ref 70–99)
GLUCOSE SERPL-MCNC: 74 MG/DL — SIGNIFICANT CHANGE UP (ref 70–99)
HCT VFR BLD CALC: 31 % — LOW (ref 34.5–45)
HGB BLD-MCNC: 9.7 G/DL — LOW (ref 11.5–15.5)
INR BLD: 1.09 RATIO — SIGNIFICANT CHANGE UP (ref 0.88–1.16)
MCHC RBC-ENTMCNC: 30.1 PG — SIGNIFICANT CHANGE UP (ref 27–34)
MCHC RBC-ENTMCNC: 31.2 GM/DL — LOW (ref 32–36)
MCV RBC AUTO: 96.2 FL — SIGNIFICANT CHANGE UP (ref 80–100)
METHOD TYPE: SIGNIFICANT CHANGE UP
ORGANISM # SPEC MICROSCOPIC CNT: SIGNIFICANT CHANGE UP
ORGANISM # SPEC MICROSCOPIC CNT: SIGNIFICANT CHANGE UP
PLATELET # BLD AUTO: 249 K/UL — SIGNIFICANT CHANGE UP (ref 150–400)
POTASSIUM SERPL-MCNC: 3.9 MMOL/L — SIGNIFICANT CHANGE UP (ref 3.5–5.3)
POTASSIUM SERPL-SCNC: 3.9 MMOL/L — SIGNIFICANT CHANGE UP (ref 3.5–5.3)
PROTHROM AB SERPL-ACNC: 11.9 SEC — SIGNIFICANT CHANGE UP (ref 9.8–12.7)
RBC # BLD: 3.22 M/UL — LOW (ref 3.8–5.2)
RBC # FLD: 14.7 % — HIGH (ref 10.3–14.5)
SODIUM SERPL-SCNC: 139 MMOL/L — SIGNIFICANT CHANGE UP (ref 135–145)
SPECIMEN SOURCE: SIGNIFICANT CHANGE UP
WBC # BLD: 7.1 K/UL — SIGNIFICANT CHANGE UP (ref 3.8–10.5)
WBC # FLD AUTO: 7.1 K/UL — SIGNIFICANT CHANGE UP (ref 3.8–10.5)

## 2018-02-02 PROCEDURE — 81001 URINALYSIS AUTO W/SCOPE: CPT

## 2018-02-02 PROCEDURE — 87486 CHLMYD PNEUM DNA AMP PROBE: CPT

## 2018-02-02 PROCEDURE — 76937 US GUIDE VASCULAR ACCESS: CPT

## 2018-02-02 PROCEDURE — 76937 US GUIDE VASCULAR ACCESS: CPT | Mod: 26

## 2018-02-02 PROCEDURE — 83605 ASSAY OF LACTIC ACID: CPT

## 2018-02-02 PROCEDURE — 71045 X-RAY EXAM CHEST 1 VIEW: CPT

## 2018-02-02 PROCEDURE — 87086 URINE CULTURE/COLONY COUNT: CPT

## 2018-02-02 PROCEDURE — 82607 VITAMIN B-12: CPT

## 2018-02-02 PROCEDURE — 85610 PROTHROMBIN TIME: CPT

## 2018-02-02 PROCEDURE — 77001 FLUOROGUIDE FOR VEIN DEVICE: CPT

## 2018-02-02 PROCEDURE — 82306 VITAMIN D 25 HYDROXY: CPT

## 2018-02-02 PROCEDURE — 80048 BASIC METABOLIC PNL TOTAL CA: CPT

## 2018-02-02 PROCEDURE — 99285 EMERGENCY DEPT VISIT HI MDM: CPT | Mod: 25

## 2018-02-02 PROCEDURE — 83036 HEMOGLOBIN GLYCOSYLATED A1C: CPT

## 2018-02-02 PROCEDURE — 84100 ASSAY OF PHOSPHORUS: CPT

## 2018-02-02 PROCEDURE — 84443 ASSAY THYROID STIM HORMONE: CPT

## 2018-02-02 PROCEDURE — 87040 BLOOD CULTURE FOR BACTERIA: CPT

## 2018-02-02 PROCEDURE — 83735 ASSAY OF MAGNESIUM: CPT

## 2018-02-02 PROCEDURE — C1889: CPT

## 2018-02-02 PROCEDURE — 85027 COMPLETE CBC AUTOMATED: CPT

## 2018-02-02 PROCEDURE — 80061 LIPID PANEL: CPT

## 2018-02-02 PROCEDURE — 36569 INSJ PICC 5 YR+ W/O IMAGING: CPT

## 2018-02-02 PROCEDURE — 71045 X-RAY EXAM CHEST 1 VIEW: CPT | Mod: 26

## 2018-02-02 PROCEDURE — 87798 DETECT AGENT NOS DNA AMP: CPT

## 2018-02-02 PROCEDURE — 80053 COMPREHEN METABOLIC PANEL: CPT

## 2018-02-02 PROCEDURE — 87581 M.PNEUMON DNA AMP PROBE: CPT

## 2018-02-02 PROCEDURE — 82746 ASSAY OF FOLIC ACID SERUM: CPT

## 2018-02-02 PROCEDURE — 85730 THROMBOPLASTIN TIME PARTIAL: CPT

## 2018-02-02 PROCEDURE — 77001 FLUOROGUIDE FOR VEIN DEVICE: CPT | Mod: 26

## 2018-02-02 PROCEDURE — 93005 ELECTROCARDIOGRAM TRACING: CPT

## 2018-02-02 PROCEDURE — 82962 GLUCOSE BLOOD TEST: CPT

## 2018-02-02 PROCEDURE — 87186 SC STD MICRODIL/AGAR DIL: CPT

## 2018-02-02 PROCEDURE — C1751: CPT

## 2018-02-02 PROCEDURE — 87633 RESP VIRUS 12-25 TARGETS: CPT

## 2018-02-02 RX ADMIN — CLOPIDOGREL BISULFATE 75 MILLIGRAM(S): 75 TABLET, FILM COATED ORAL at 13:13

## 2018-02-02 RX ADMIN — PIPERACILLIN AND TAZOBACTAM 25 GRAM(S): 4; .5 INJECTION, POWDER, LYOPHILIZED, FOR SOLUTION INTRAVENOUS at 06:04

## 2018-02-02 RX ADMIN — Medication 37.5 MILLIGRAM(S): at 13:14

## 2018-02-02 RX ADMIN — PIPERACILLIN AND TAZOBACTAM 25 GRAM(S): 4; .5 INJECTION, POWDER, LYOPHILIZED, FOR SOLUTION INTRAVENOUS at 13:13

## 2018-02-02 RX ADMIN — Medication 500 MILLIGRAM(S): at 13:12

## 2018-02-02 RX ADMIN — Medication 100 MILLIGRAM(S): at 06:04

## 2018-02-02 NOTE — PROGRESS NOTE ADULT - ATTENDING COMMENTS
Events noted Patient  is  non verbal  awake  but  responsive  Patient  daughter  at bedside  treatment  for UTI in progress  ESBL complete  Ertapenem CXR possible  RLL Pneumonia worsening infiltrate patient  spiked fever  for past 2 days started  on doxycycline  but  changed to Zosyn by ID    Picc line in place      Failure to thrive  patient   Peg placement   Tolerated  GT feeding well    Dementia AT stable  Quadriplegia  supportive  care Mental status  is  stable Discharge  planning after  PICC line placed  for today

## 2018-02-02 NOTE — PROGRESS NOTE ADULT - PROVIDER SPECIALTY LIST ADULT
Gastroenterology
Infectious Disease
Internal Medicine
Palliative Care
Internal Medicine

## 2018-02-02 NOTE — PROGRESS NOTE ADULT - SUBJECTIVE AND OBJECTIVE BOX
Patient is a 88y old  Female who presents with a chief complaint of failure to thrive (29 Jan 2018 16:29)      INTERVAL HPI/OVERNIGHT EVENTS: patient  is  comfortable  non verbal  s/p Picc line  placement  Pag feeding  tolerated well    T(C): 37 (02-02-18 @ 13:17), Max: 37.2 (02-01-18 @ 20:22)  HR: 77 (02-02-18 @ 13:17) (77 - 89)  BP: 103/49 (02-02-18 @ 13:17) (92/52 - 103/49)  RR: 14 (02-02-18 @ 13:17) (14 - 16)  SpO2: 98% (02-02-18 @ 13:17) (96% - 100%)  Wt(kg): --Vital Signs Last 24 Hrs  T(C): 37 (02 Feb 2018 13:17), Max: 37.2 (01 Feb 2018 20:22)  T(F): 98.6 (02 Feb 2018 13:17), Max: 99 (02 Feb 2018 04:50)  HR: 77 (02 Feb 2018 13:17) (77 - 89)  BP: 103/49 (02 Feb 2018 13:17) (92/52 - 103/49)  BP(mean): --  RR: 14 (02 Feb 2018 13:17) (14 - 16)  SpO2: 98% (02 Feb 2018 13:17) (96% - 100%)  I&O's Summary    01 Feb 2018 07:01  -  02 Feb 2018 07:00  --------------------------------------------------------  IN: 100 mL / OUT: 0 mL / NET: 100 mL        LABS:                        9.7    7.1   )-----------( 249      ( 02 Feb 2018 06:07 )             31.0     02-02    139  |  104  |  10  ----------------------------<  74  3.9   |  26  |  0.50    Ca    8.5      02 Feb 2018 06:07      PT/INR - ( 02 Feb 2018 06:07 )   PT: 11.9 sec;   INR: 1.09 ratio         PTT - ( 02 Feb 2018 06:07 )  PTT:32.4 sec    CAPILLARY BLOOD GLUCOSE      POCT Blood Glucose.: 90 mg/dL (02 Feb 2018 11:32)  POCT Blood Glucose.: 100 mg/dL (02 Feb 2018 07:25)  POCT Blood Glucose.: 101 mg/dL (01 Feb 2018 21:45)  POCT Blood Glucose.: 135 mg/dL (01 Feb 2018 16:10)    MEDICATIONS  (STANDING):  ascorbic acid 500 milliGRAM(s) Oral daily  clopidogrel Tablet 75 milliGRAM(s) Oral daily  dextrose 5%. 1000 milliLiter(s) (50 mL/Hr) IV Continuous <Continuous>  dextrose 50% Injectable 12.5 Gram(s) IV Push once  dextrose 50% Injectable 25 Gram(s) IV Push once  dextrose 50% Injectable 25 Gram(s) IV Push once  docusate sodium 100 milliGRAM(s) Oral two times a day  insulin lispro (HumaLOG) corrective regimen sliding scale   SubCutaneous three times a day before meals  mirtazapine 15 milliGRAM(s) Oral at bedtime  piperacillin/tazobactam IVPB. 3.375 Gram(s) IV Intermittent every 8 hours  senna 2 Tablet(s) Oral at bedtime  venlafaxine XR. 37.5 milliGRAM(s) Oral daily    MEDICATIONS  (PRN):  acetaminophen   Tablet 650 milliGRAM(s) Oral every 6 hours PRN mild pain 1-3  acetaminophen  Suppository 650 milliGRAM(s) Rectal every 6 hours PRN For Temp greater than 38 C (100.4 F)  dextrose Gel 1 Dose(s) Oral once PRN Blood Glucose LESS THAN 70 milliGRAM(s)/deciliter  glucagon  Injectable 1 milliGRAM(s) IntraMuscular once PRN Glucose LESS THAN 70 milligrams/deciliter  sodium biphosphate Rectal Enema 1 Enema Rectal daily PRN constipation        REVIEW OF SYSTEMS:  CONSTITUTIONAL: No fever, weight loss, or fatigue  EYES: No eye pain, visual disturbances, or discharge  ENMT:  No difficulty hearing, tinnitus, vertigo; No sinus or throat pain  NECK: No pain or stiffness  BREASTS: No pain, masses, or nipple discharge  RESPIRATORY: No cough, wheezing, chills or hemoptysis; No shortness of breath  CARDIOVASCULAR: No chest pain, palpitations, dizziness, or leg swelling  GASTROINTESTINAL: No abdominal or epigastric pain. No nausea, vomiting, or hematemesis; No diarrhea or constipation. No melena or hematochezia. PEG   GENITOURINARY: No dysuria, frequency, hematuria, or incontinence  NEUROLOGICAL: No headaches, memory loss, loss of strength, numbness, or tremors  SKIN: No itching, burning, rashes, or lesions   LYMPH NODES: No enlarged glands  ENDOCRINE: No heat or cold intolerance; No hair loss  MUSCULOSKELETAL: No joint pain or swelling; No muscle, back, or extremity pain   PSYCHIATRIC: No depression, anxiety, mood swings, or difficulty sleeping  HEME/LYMPH: No easy bruising, or bleeding gums  ALLERGY AND IMMUNOLOGIC: No hives or eczema    RADIOLOGY & ADDITIONAL TESTS:    Imaging Personally Reviewed:  [ ] YES  [ ] NO    Consultant(s) Notes Reviewed:  [x ] YES  [ ] NO    PHYSICAL EXAM:  GENERAL: NAD, well-groomed, well-developed  HEAD:  Atraumatic, Normocephalic  EYES: EOMI, PERRLA, conjunctiva and sclera clear  ENMT: No tonsillar erythema, exudates, or enlargement; Moist mucous membranes, Good dentition, No lesions  NECK: Supple, No JVD, Normal thyroid  NERVOUS SYSTEM:  Alert & Oriented X3, Good concentration; Motor Strength 5/5 B/L upper and lower extremities; DTRs 2+ intact and symmetric  CHEST/LUNG: Clear to percussion bilaterally; No rales, rhonchi, wheezing, or rubs  HEART: Regular rate and rhythm; No murmurs, rubs, or gallops  ABDOMEN: Soft, Nontender, Nondistended; Bowel sounds present PEG   EXTREMITIES:  2+ Peripheral Pulses, No clubbing, cyanosis, or edema Picc line  right  upper  arm   LYMPH: No lymphadenopathy noted  SKIN: No rashes or lesions    Care Discussed with Consultants/Other Providers [ x] YES  [ ] NO

## 2018-02-05 LAB
CULTURE RESULTS: SIGNIFICANT CHANGE UP
SPECIMEN SOURCE: SIGNIFICANT CHANGE UP

## 2018-08-03 ENCOUNTER — EMERGENCY (EMERGENCY)
Facility: HOSPITAL | Age: 83
LOS: 1 days | Discharge: HOME | End: 2018-08-03
Attending: EMERGENCY MEDICINE
Payer: MEDICARE

## 2018-08-03 VITALS
SYSTOLIC BLOOD PRESSURE: 110 MMHG | DIASTOLIC BLOOD PRESSURE: 60 MMHG | TEMPERATURE: 98 F | RESPIRATION RATE: 16 BRPM | HEART RATE: 85 BPM | OXYGEN SATURATION: 98 %

## 2018-08-03 VITALS
SYSTOLIC BLOOD PRESSURE: 109 MMHG | TEMPERATURE: 98 F | WEIGHT: 145.06 LBS | HEART RATE: 87 BPM | HEIGHT: 61 IN | OXYGEN SATURATION: 95 % | DIASTOLIC BLOOD PRESSURE: 58 MMHG | RESPIRATION RATE: 16 BRPM

## 2018-08-03 PROBLEM — F32.9 MAJOR DEPRESSIVE DISORDER, SINGLE EPISODE, UNSPECIFIED: Chronic | Status: ACTIVE | Noted: 2018-01-20

## 2018-08-03 PROBLEM — I10 ESSENTIAL (PRIMARY) HYPERTENSION: Chronic | Status: ACTIVE | Noted: 2018-01-20

## 2018-08-03 PROBLEM — F03.90 UNSPECIFIED DEMENTIA WITHOUT BEHAVIORAL DISTURBANCE: Chronic | Status: ACTIVE | Noted: 2018-01-19

## 2018-08-03 PROBLEM — K59.00 CONSTIPATION, UNSPECIFIED: Chronic | Status: ACTIVE | Noted: 2018-01-20

## 2018-08-03 PROCEDURE — 99284 EMERGENCY DEPT VISIT MOD MDM: CPT

## 2018-08-03 PROCEDURE — 49465 FLUORO EXAM OF G/COLON TUBE: CPT

## 2018-08-03 PROCEDURE — 99283 EMERGENCY DEPT VISIT LOW MDM: CPT | Mod: 25

## 2018-08-03 NOTE — ED PROVIDER NOTE - OBJECTIVE STATEMENT
88 y/o F pt with a PMHx of constipation, dementia, depression and HTN and no significant PSHx presents to the ED with HHA who reports that pt was brought to GI today to have g-tube replaced but the GI doctor told her that she needs to go to ED to have it replaced. Pt denies any other complaints. Pt unable to give ROS due to dementia. NKDA.

## 2018-08-03 NOTE — ED ADULT NURSE REASSESSMENT NOTE - NS ED NURSE REASSESS COMMENT FT1
patient comfortable denies any pain ambulating independently with supervision comfort measures provided. will monitor

## 2018-08-03 NOTE — ED PROVIDER NOTE - MEDICAL DECISION MAKING DETAILS
89 y/o F pt with g-tube to be replaced by GI. F/u with PMD in 1-2 days. Return to ED if feeling worse or not feeling better or any new or concerning sx.

## 2018-08-03 NOTE — ED PROVIDER NOTE - PROGRESS NOTE DETAILS
Spoke with Dr. Kota Bailey who is fine wtih me replacing tube, however it does not have a balloon.  When I told him this, he said he would take pt to endoscopy and then return pt to ER to be DC'd. Spoke with Dr. Bailey again. Patient was brought up to endoscopy suite but Dr. Bailey did not use endoscopy or anesthesia when he replaced g-tube. G-tube in place on XR. Will return to NH.

## 2018-08-03 NOTE — ED ADULT NURSE NOTE - NSIMPLEMENTINTERV_GEN_ALL_ED
Implemented All Fall with Harm Risk Interventions:  [place] to call system. Call bell, personal items and telephone within reach. Instruct patient to call for assistance. Room bathroom lighting operational. Non-slip footwear when patient is off stretcher. Physically safe environment: no spills, clutter or unnecessary equipment. Stretcher in lowest position, wheels locked, appropriate side rails in place. Provide visual cue, wrist band, yellow gown, etc. Monitor gait and stability. Monitor for mental status changes and reorient to person, place, and time. Review medications for side effects contributing to fall risk. Reinforce activity limits and safety measures with patient and family. Provide visual clues: red socks.

## 2018-12-04 ENCOUNTER — EMERGENCY (EMERGENCY)
Facility: HOSPITAL | Age: 83
LOS: 1 days | Discharge: HOME | End: 2018-12-04
Attending: STUDENT IN AN ORGANIZED HEALTH CARE EDUCATION/TRAINING PROGRAM
Payer: MEDICARE

## 2018-12-04 VITALS
HEART RATE: 99 BPM | WEIGHT: 144.62 LBS | OXYGEN SATURATION: 95 % | TEMPERATURE: 98 F | RESPIRATION RATE: 17 BRPM | DIASTOLIC BLOOD PRESSURE: 69 MMHG | SYSTOLIC BLOOD PRESSURE: 114 MMHG

## 2018-12-04 PROCEDURE — 99284 EMERGENCY DEPT VISIT MOD MDM: CPT | Mod: 25

## 2018-12-04 PROCEDURE — 49465 FLUORO EXAM OF G/COLON TUBE: CPT

## 2018-12-04 PROCEDURE — 43760 CHANGE GASTROSTOMY TUBE PERCUTANEOUS W/O GUIDE: CPT

## 2018-12-04 PROCEDURE — L8699: CPT

## 2018-12-04 PROCEDURE — 43760: CPT

## 2018-12-04 NOTE — ED PROVIDER NOTE - MEDICAL DECISION MAKING DETAILS
patient presenting for concern for peg tube dislodgement. otherwise vital stable. mental status baseline per pmd. will replace peg tube

## 2018-12-04 NOTE — ED PROCEDURE NOTE - CPROC ED INFORMED CONSENT1
patient nonverbal/Benefits, risks, and possible complications of procedure explained to patient/caregiver who verbalized understanding and gave verbal consent.

## 2018-12-04 NOTE — ED PROVIDER NOTE - PROGRESS NOTE DETAILS
patient gtube replaced, 16 fr, same size as gtube that was in place. kub performed, on my review, contrast in stomach. patient vital stable. will send back to nursing home.

## 2018-12-04 NOTE — ED PROVIDER NOTE - OBJECTIVE STATEMENT
89 y.o sent in from nursing for peg tube displacement. patient nonverbal. discussed case with dr childress, per dr childress, patient accidently dislodge gtube. ostomy placed 8 months ago. no other medical complaint or concern from nursing home.

## 2018-12-05 NOTE — ED ADULT NURSE NOTE - NSIMPLEMENTINTERV_GEN_ALL_ED
Implemented All Fall with Harm Risk Interventions:  [place] to call system. Call bell, personal items and telephone within reach. Instruct patient to call for assistance. Room bathroom lighting operational. Non-slip footwear when patient is off stretcher. Physically safe environment: no spills, clutter or unnecessary equipment. Stretcher in lowest position, wheels locked, appropriate side rails in place. Provide visual cue, wrist band, yellow gown, etc. Monitor gait and stability. Monitor for mental status changes and reorient to person, place, and time. Review medications for side effects contributing to fall risk. Reinforce activity limits and safety measures with patient and family. Provide visual clues: red socks.

## 2019-03-25 ENCOUNTER — EMERGENCY (EMERGENCY)
Facility: HOSPITAL | Age: 84
LOS: 1 days | Discharge: HOME | End: 2019-03-25
Attending: EMERGENCY MEDICINE
Payer: MEDICARE

## 2019-03-25 VITALS
SYSTOLIC BLOOD PRESSURE: 117 MMHG | DIASTOLIC BLOOD PRESSURE: 81 MMHG | HEART RATE: 89 BPM | OXYGEN SATURATION: 96 % | TEMPERATURE: 98 F | RESPIRATION RATE: 16 BRPM

## 2019-03-25 VITALS
HEART RATE: 112 BPM | RESPIRATION RATE: 18 BRPM | TEMPERATURE: 98 F | OXYGEN SATURATION: 98 % | SYSTOLIC BLOOD PRESSURE: 124 MMHG | WEIGHT: 145.06 LBS | DIASTOLIC BLOOD PRESSURE: 68 MMHG

## 2019-03-25 PROCEDURE — L8699: CPT

## 2019-03-25 PROCEDURE — 49440 PLACE GASTROSTOMY TUBE PERC: CPT

## 2019-03-25 PROCEDURE — 74019 RADEX ABDOMEN 2 VIEWS: CPT

## 2019-03-25 PROCEDURE — 99284 EMERGENCY DEPT VISIT MOD MDM: CPT | Mod: 25

## 2019-03-25 PROCEDURE — 99283 EMERGENCY DEPT VISIT LOW MDM: CPT

## 2019-03-25 PROCEDURE — 74019 RADEX ABDOMEN 2 VIEWS: CPT | Mod: 26

## 2019-03-25 NOTE — ED PROVIDER NOTE - OBJECTIVE STATEMENT
89 y/o female sent to the ED from nursing home for feeding tube replacement after the tube became dislodged. Pt is nonverbal at baseline. HPI limited due to nonverbal status.

## 2019-03-25 NOTE — ED PROVIDER NOTE - CLINICAL SUMMARY MEDICAL DECISION MAKING FREE TEXT BOX
Replaced feeding tube; confirmed by physician and x-ray. Will discharge home. Replaced feeding tube; confirmed by physician and x-ray. Will discharge to nursing home.

## 2019-10-11 ENCOUNTER — INPATIENT (INPATIENT)
Facility: HOSPITAL | Age: 84
LOS: 0 days | Discharge: ANOTHER HOSPITAL | DRG: 442 | End: 2019-10-12
Attending: INTERNAL MEDICINE | Admitting: INTERNAL MEDICINE
Payer: MEDICARE

## 2019-10-11 VITALS
DIASTOLIC BLOOD PRESSURE: 73 MMHG | RESPIRATION RATE: 20 BRPM | SYSTOLIC BLOOD PRESSURE: 121 MMHG | TEMPERATURE: 97 F | HEART RATE: 84 BPM | OXYGEN SATURATION: 96 % | WEIGHT: 130.07 LBS

## 2019-10-11 VITALS
SYSTOLIC BLOOD PRESSURE: 115 MMHG | TEMPERATURE: 98 F | OXYGEN SATURATION: 95 % | RESPIRATION RATE: 18 BRPM | HEART RATE: 85 BPM | DIASTOLIC BLOOD PRESSURE: 50 MMHG

## 2019-10-11 DIAGNOSIS — R17 UNSPECIFIED JAUNDICE: ICD-10-CM

## 2019-10-11 LAB
ALBUMIN SERPL ELPH-MCNC: 2.7 G/DL — LOW (ref 3.5–5)
ALP SERPL-CCNC: 1366 U/L — HIGH (ref 40–120)
ALT FLD-CCNC: 120 U/L DA — HIGH (ref 10–60)
ANION GAP SERPL CALC-SCNC: 5 MMOL/L — SIGNIFICANT CHANGE UP (ref 5–17)
AST SERPL-CCNC: 175 U/L — HIGH (ref 10–40)
BASOPHILS # BLD AUTO: 0.02 K/UL — SIGNIFICANT CHANGE UP (ref 0–0.2)
BASOPHILS NFR BLD AUTO: 0.2 % — SIGNIFICANT CHANGE UP (ref 0–2)
BILIRUB SERPL-MCNC: 5.5 MG/DL — HIGH (ref 0.2–1.2)
BUN SERPL-MCNC: 21 MG/DL — HIGH (ref 7–18)
CALCIUM SERPL-MCNC: 9.6 MG/DL — SIGNIFICANT CHANGE UP (ref 8.4–10.5)
CHLORIDE SERPL-SCNC: 99 MMOL/L — SIGNIFICANT CHANGE UP (ref 96–108)
CO2 SERPL-SCNC: 28 MMOL/L — SIGNIFICANT CHANGE UP (ref 22–31)
CREAT SERPL-MCNC: 0.53 MG/DL — SIGNIFICANT CHANGE UP (ref 0.5–1.3)
EOSINOPHIL # BLD AUTO: 0.25 K/UL — SIGNIFICANT CHANGE UP (ref 0–0.5)
EOSINOPHIL NFR BLD AUTO: 2.8 % — SIGNIFICANT CHANGE UP (ref 0–6)
GLUCOSE SERPL-MCNC: 89 MG/DL — SIGNIFICANT CHANGE UP (ref 70–99)
HCT VFR BLD CALC: 35.4 % — SIGNIFICANT CHANGE UP (ref 34.5–45)
HGB BLD-MCNC: 11.6 G/DL — SIGNIFICANT CHANGE UP (ref 11.5–15.5)
IMM GRANULOCYTES NFR BLD AUTO: 0.3 % — SIGNIFICANT CHANGE UP (ref 0–1.5)
LIDOCAIN IGE QN: 445 U/L — HIGH (ref 73–393)
LYMPHOCYTES # BLD AUTO: 2.69 K/UL — SIGNIFICANT CHANGE UP (ref 1–3.3)
LYMPHOCYTES # BLD AUTO: 30.2 % — SIGNIFICANT CHANGE UP (ref 13–44)
MCHC RBC-ENTMCNC: 32.8 GM/DL — SIGNIFICANT CHANGE UP (ref 32–36)
MCHC RBC-ENTMCNC: 33.8 PG — SIGNIFICANT CHANGE UP (ref 27–34)
MCV RBC AUTO: 103.2 FL — HIGH (ref 80–100)
MONOCYTES # BLD AUTO: 0.9 K/UL — SIGNIFICANT CHANGE UP (ref 0–0.9)
MONOCYTES NFR BLD AUTO: 10.1 % — SIGNIFICANT CHANGE UP (ref 2–14)
NEUTROPHILS # BLD AUTO: 5.02 K/UL — SIGNIFICANT CHANGE UP (ref 1.8–7.4)
NEUTROPHILS NFR BLD AUTO: 56.4 % — SIGNIFICANT CHANGE UP (ref 43–77)
NRBC # BLD: 0 /100 WBCS — SIGNIFICANT CHANGE UP (ref 0–0)
PLATELET # BLD AUTO: 386 K/UL — SIGNIFICANT CHANGE UP (ref 150–400)
POTASSIUM SERPL-MCNC: 4.3 MMOL/L — SIGNIFICANT CHANGE UP (ref 3.5–5.3)
POTASSIUM SERPL-SCNC: 4.3 MMOL/L — SIGNIFICANT CHANGE UP (ref 3.5–5.3)
PROT SERPL-MCNC: 7.1 G/DL — SIGNIFICANT CHANGE UP (ref 6–8.3)
RBC # BLD: 3.43 M/UL — LOW (ref 3.8–5.2)
RBC # FLD: 16.2 % — HIGH (ref 10.3–14.5)
SODIUM SERPL-SCNC: 132 MMOL/L — LOW (ref 135–145)
WBC # BLD: 8.91 K/UL — SIGNIFICANT CHANGE UP (ref 3.8–10.5)
WBC # FLD AUTO: 8.91 K/UL — SIGNIFICANT CHANGE UP (ref 3.8–10.5)

## 2019-10-11 PROCEDURE — 99285 EMERGENCY DEPT VISIT HI MDM: CPT

## 2019-10-11 PROCEDURE — 36415 COLL VENOUS BLD VENIPUNCTURE: CPT

## 2019-10-11 PROCEDURE — 71045 X-RAY EXAM CHEST 1 VIEW: CPT

## 2019-10-11 PROCEDURE — 76705 ECHO EXAM OF ABDOMEN: CPT

## 2019-10-11 PROCEDURE — 82248 BILIRUBIN DIRECT: CPT

## 2019-10-11 PROCEDURE — 96374 THER/PROPH/DIAG INJ IV PUSH: CPT

## 2019-10-11 PROCEDURE — 99285 EMERGENCY DEPT VISIT HI MDM: CPT | Mod: 25

## 2019-10-11 PROCEDURE — 74177 CT ABD & PELVIS W/CONTRAST: CPT | Mod: 26

## 2019-10-11 PROCEDURE — 71045 X-RAY EXAM CHEST 1 VIEW: CPT | Mod: 26

## 2019-10-11 PROCEDURE — 96375 TX/PRO/DX INJ NEW DRUG ADDON: CPT

## 2019-10-11 PROCEDURE — 76705 ECHO EXAM OF ABDOMEN: CPT | Mod: 26

## 2019-10-11 PROCEDURE — 74177 CT ABD & PELVIS W/CONTRAST: CPT

## 2019-10-11 PROCEDURE — 80053 COMPREHEN METABOLIC PANEL: CPT

## 2019-10-11 PROCEDURE — 83690 ASSAY OF LIPASE: CPT

## 2019-10-11 PROCEDURE — 85027 COMPLETE CBC AUTOMATED: CPT

## 2019-10-11 RX ORDER — ACETAMINOPHEN 500 MG
5 TABLET ORAL
Qty: 0 | Refills: 0 | DISCHARGE

## 2019-10-11 RX ORDER — LOSARTAN POTASSIUM 100 MG/1
1 TABLET, FILM COATED ORAL
Qty: 0 | Refills: 0 | DISCHARGE

## 2019-10-11 RX ORDER — DOCUSATE SODIUM 100 MG
10 CAPSULE ORAL
Qty: 0 | Refills: 0 | DISCHARGE

## 2019-10-11 RX ORDER — SODIUM CHLORIDE 9 MG/ML
1000 INJECTION INTRAMUSCULAR; INTRAVENOUS; SUBCUTANEOUS
Refills: 0 | Status: DISCONTINUED | OUTPATIENT
Start: 2019-10-11 | End: 2019-10-12

## 2019-10-11 RX ORDER — SENNA PLUS 8.6 MG/1
1 TABLET ORAL
Qty: 0 | Refills: 0 | DISCHARGE

## 2019-10-11 RX ORDER — MIRTAZAPINE 45 MG/1
1 TABLET, ORALLY DISINTEGRATING ORAL
Qty: 0 | Refills: 0 | DISCHARGE

## 2019-10-11 RX ORDER — CLOPIDOGREL BISULFATE 75 MG/1
1 TABLET, FILM COATED ORAL
Qty: 0 | Refills: 0 | DISCHARGE

## 2019-10-11 RX ORDER — POLYETHYLENE GLYCOL 3350 17 G/17G
17 POWDER, FOR SOLUTION ORAL
Qty: 0 | Refills: 0 | DISCHARGE

## 2019-10-11 RX ORDER — BACITRACIN ZINC 500 UNIT/G
0.5 OINTMENT IN PACKET (EA) TOPICAL
Qty: 0 | Refills: 0 | DISCHARGE

## 2019-10-11 RX ORDER — VENLAFAXINE HCL 75 MG
1 CAPSULE, EXT RELEASE 24 HR ORAL
Qty: 0 | Refills: 0 | DISCHARGE

## 2019-10-11 RX ORDER — METRONIDAZOLE 500 MG
500 TABLET ORAL ONCE
Refills: 0 | Status: COMPLETED | OUTPATIENT
Start: 2019-10-11 | End: 2019-10-11

## 2019-10-11 RX ORDER — KETOROLAC TROMETHAMINE 30 MG/ML
15 SYRINGE (ML) INJECTION ONCE
Refills: 0 | Status: DISCONTINUED | OUTPATIENT
Start: 2019-10-11 | End: 2019-10-11

## 2019-10-11 RX ORDER — METRONIDAZOLE 500 MG
500 TABLET ORAL ONCE
Refills: 0 | Status: DISCONTINUED | OUTPATIENT
Start: 2019-10-11 | End: 2019-10-11

## 2019-10-11 RX ORDER — PIPERACILLIN AND TAZOBACTAM 4; .5 G/20ML; G/20ML
3.38 INJECTION, POWDER, LYOPHILIZED, FOR SOLUTION INTRAVENOUS
Qty: 0 | Refills: 0 | DISCHARGE

## 2019-10-11 RX ORDER — ASCORBIC ACID 60 MG
5 TABLET,CHEWABLE ORAL
Qty: 0 | Refills: 0 | DISCHARGE

## 2019-10-11 RX ORDER — VENLAFAXINE HCL 75 MG
0.5 CAPSULE, EXT RELEASE 24 HR ORAL
Qty: 0 | Refills: 0 | DISCHARGE

## 2019-10-11 RX ORDER — NYSTATIN CREAM 100000 [USP'U]/G
1 CREAM TOPICAL
Qty: 0 | Refills: 0 | DISCHARGE

## 2019-10-11 RX ORDER — CIPROFLOXACIN LACTATE 400MG/40ML
400 VIAL (ML) INTRAVENOUS ONCE
Refills: 0 | Status: DISCONTINUED | OUTPATIENT
Start: 2019-10-11 | End: 2019-10-11

## 2019-10-11 RX ORDER — CIPROFLOXACIN LACTATE 400MG/40ML
VIAL (ML) INTRAVENOUS
Refills: 0 | Status: DISCONTINUED | OUTPATIENT
Start: 2019-10-11 | End: 2019-10-11

## 2019-10-11 RX ORDER — METRONIDAZOLE 500 MG
TABLET ORAL
Refills: 0 | Status: DISCONTINUED | OUTPATIENT
Start: 2019-10-11 | End: 2019-10-11

## 2019-10-11 RX ADMIN — Medication 15 MILLIGRAM(S): at 22:08

## 2019-10-11 RX ADMIN — SODIUM CHLORIDE 70 MILLILITER(S): 9 INJECTION INTRAMUSCULAR; INTRAVENOUS; SUBCUTANEOUS at 22:13

## 2019-10-11 RX ADMIN — SODIUM CHLORIDE 70 MILLILITER(S): 9 INJECTION INTRAMUSCULAR; INTRAVENOUS; SUBCUTANEOUS at 23:09

## 2019-10-11 RX ADMIN — Medication 100 MILLIGRAM(S): at 23:09

## 2019-10-11 NOTE — ED PROVIDER NOTE - PROGRESS NOTE DETAILS
discussed the case with the admitting MD who accepts the patient for admission. dw dr nita reeder from gi - agrees to see pt. CT showed CBD stone. admitting resident called Dr. Bailey who recommended transfer to [facility]. Patient's admission not completed. D/w Dr. Goetz. Will initiate ED to ED transfer. Transfer center called. Spoke to Asha

## 2019-10-11 NOTE — ED ADULT NURSE NOTE - NSIMPLEMENTINTERV_GEN_ALL_ED
Implemented All Fall with Harm Risk Interventions:  [place] to call system. Call bell, personal items and telephone within reach. Instruct patient to call for assistance. Room bathroom lighting operational. Non-slip footwear when patient is off stretcher. Physically safe environment: no spills, clutter or unnecessary equipment. Stretcher in lowest position, wheels locked, appropriate side rails in place. Provide visual cue, wrist band, yellow gown, etc. Monitor gait and stability. Monitor for mental status changes and reorient to person, place, and time. Review medications for side effects contributing to fall risk. Reinforce activity limits and safety measures with patient and family. Provide visual clues: red socks.

## 2019-10-11 NOTE — ED PROVIDER NOTE - OBJECTIVE STATEMENT
Patient is a 89 y/o female with PMHx obtained from nursing home paperwork/family dementia, depression, htn, brought in by her family for 1-2 weeks of jaundice. They report that her G2 is supposedly working properly. They deny abd pain but are unable to tell. They deny fevers and the pt does not appear ill or uncomfortable. NKDA. Patient is a 89 y/o female with PMHx obtained from nursing home paperwork/family dementia, depression, htn, brought in by her family for 1-2 weeks of jaundice. They report that her G tube is supposedly working properly. They deny abd pain but are unable to tell. They deny fevers and the pt does not appear ill or uncomfortable. NKDA.

## 2019-10-11 NOTE — ED PROVIDER NOTE - MDM ORDERS SUBMITTED SELECTION
Imaging Studies Advancement-Rotation Flap Text: The defect edges were debeveled with a #15 scalpel blade.  Given the location of the defect, shape of the defect and the proximity to free margins an advancement-rotation flap was deemed most appropriate.  Using a sterile surgical marker, an appropriate flap was drawn incorporating the defect and placing the expected incisions within the relaxed skin tension lines where possible. The area thus outlined was incised deep to adipose tissue with a #15 scalpel blade.  The skin margins were undermined to an appropriate distance in all directions utilizing iris scissors.

## 2019-10-11 NOTE — ED PROVIDER NOTE - NSRISKOFTRANSFER_ED_A_ED
Increased Pain/Transportation Risk (There is always a risk of traffic delays resulting in deterioration of condition.)/Deterioration of Condition En Route/Death or Disability

## 2019-10-11 NOTE — ED PROVIDER NOTE - PHYSICAL EXAMINATION
Aphasic. Contracted. Chronically ill but not acutely. Jaundice.   Abd:  G2 in left abd. Soft, nontender, nondistended.   Resp:  Lungs clear.  Psych:  Unable to obtain.  Neuro:  Contracted all 4 extremities but does withdraw. Aphasic. Aphasic. Contracted. Chronically ill but not acutely. Jaundice.   Abd:  G tube in left abd. Soft, nontender, nondistended.   Resp:  Lungs clear.  Psych:  Unable to obtain.  Neuro:  Contracted all 4 extremities but does withdraw. Aphasic.

## 2019-10-11 NOTE — ED PROVIDER NOTE - CLINICAL SUMMARY MEDICAL DECISION MAKING FREE TEXT BOX
89 y/o female presents with jaundice. Will discuss with sending MD regarding plan of care. US/Ct scan to evaluate obstruction.

## 2019-10-11 NOTE — H&P ADULT - HISTORY OF PRESENT ILLNESS
Patient is a 89 y/o female with PMHx obtained from nursing home paperwork/family dementia, depression, htn, brought in by her family for 1-2 weeks of jaundice. They report that her G2 is supposedly working properly. They deny abd pain but are unable to tell. They deny fevers and the pt does not appear ill or uncomfortable. NKDA

## 2019-10-12 ENCOUNTER — INPATIENT (INPATIENT)
Facility: HOSPITAL | Age: 84
LOS: 5 days | Discharge: NURSING, REHAB OR LONG-TERM CARE | End: 2019-10-18
Attending: HOSPITALIST | Admitting: HOSPITALIST
Payer: MEDICARE

## 2019-10-12 VITALS
TEMPERATURE: 97 F | DIASTOLIC BLOOD PRESSURE: 64 MMHG | RESPIRATION RATE: 14 BRPM | SYSTOLIC BLOOD PRESSURE: 108 MMHG | HEART RATE: 90 BPM | OXYGEN SATURATION: 95 %

## 2019-10-12 DIAGNOSIS — Z29.9 ENCOUNTER FOR PROPHYLACTIC MEASURES, UNSPECIFIED: ICD-10-CM

## 2019-10-12 DIAGNOSIS — F03.90 UNSPECIFIED DEMENTIA WITHOUT BEHAVIORAL DISTURBANCE: ICD-10-CM

## 2019-10-12 DIAGNOSIS — Z71.89 OTHER SPECIFIED COUNSELING: ICD-10-CM

## 2019-10-12 DIAGNOSIS — F32.9 MAJOR DEPRESSIVE DISORDER, SINGLE EPISODE, UNSPECIFIED: ICD-10-CM

## 2019-10-12 DIAGNOSIS — K80.50 CALCULUS OF BILE DUCT WITHOUT CHOLANGITIS OR CHOLECYSTITIS WITHOUT OBSTRUCTION: ICD-10-CM

## 2019-10-12 DIAGNOSIS — K59.00 CONSTIPATION, UNSPECIFIED: ICD-10-CM

## 2019-10-12 DIAGNOSIS — I51.9 HEART DISEASE, UNSPECIFIED: ICD-10-CM

## 2019-10-12 DIAGNOSIS — I10 ESSENTIAL (PRIMARY) HYPERTENSION: ICD-10-CM

## 2019-10-12 LAB
ALBUMIN SERPL ELPH-MCNC: 3.3 G/DL — SIGNIFICANT CHANGE UP (ref 3.3–5)
ALP SERPL-CCNC: 1163 U/L — HIGH (ref 40–120)
ALT FLD-CCNC: 84 U/L — HIGH (ref 4–33)
ANION GAP SERPL CALC-SCNC: 12 MMO/L — SIGNIFICANT CHANGE UP (ref 7–14)
APTT BLD: 35.3 SEC — SIGNIFICANT CHANGE UP (ref 27.5–36.3)
AST SERPL-CCNC: 129 U/L — HIGH (ref 4–32)
BASOPHILS # BLD AUTO: 0.03 K/UL — SIGNIFICANT CHANGE UP (ref 0–0.2)
BASOPHILS NFR BLD AUTO: 0.4 % — SIGNIFICANT CHANGE UP (ref 0–2)
BILIRUB SERPL-MCNC: 5.5 MG/DL — HIGH (ref 0.2–1.2)
BLD GP AB SCN SERPL QL: NEGATIVE — SIGNIFICANT CHANGE UP
BUN SERPL-MCNC: 19 MG/DL — SIGNIFICANT CHANGE UP (ref 7–23)
CALCIUM SERPL-MCNC: 9.3 MG/DL — SIGNIFICANT CHANGE UP (ref 8.4–10.5)
CHLORIDE SERPL-SCNC: 97 MMOL/L — LOW (ref 98–107)
CO2 SERPL-SCNC: 24 MMOL/L — SIGNIFICANT CHANGE UP (ref 22–31)
CREAT SERPL-MCNC: 0.52 MG/DL — SIGNIFICANT CHANGE UP (ref 0.5–1.3)
EOSINOPHIL # BLD AUTO: 0.16 K/UL — SIGNIFICANT CHANGE UP (ref 0–0.5)
EOSINOPHIL NFR BLD AUTO: 2.1 % — SIGNIFICANT CHANGE UP (ref 0–6)
GLUCOSE SERPL-MCNC: 86 MG/DL — SIGNIFICANT CHANGE UP (ref 70–99)
HCT VFR BLD CALC: 31.3 % — LOW (ref 34.5–45)
HGB BLD-MCNC: 10.2 G/DL — LOW (ref 11.5–15.5)
IMM GRANULOCYTES NFR BLD AUTO: 0.3 % — SIGNIFICANT CHANGE UP (ref 0–1.5)
INR BLD: 1.19 — HIGH (ref 0.88–1.17)
LIDOCAIN IGE QN: 56.6 U/L — SIGNIFICANT CHANGE UP (ref 7–60)
LYMPHOCYTES # BLD AUTO: 2.54 K/UL — SIGNIFICANT CHANGE UP (ref 1–3.3)
LYMPHOCYTES # BLD AUTO: 33.6 % — SIGNIFICANT CHANGE UP (ref 13–44)
MCHC RBC-ENTMCNC: 32.6 % — SIGNIFICANT CHANGE UP (ref 32–36)
MCHC RBC-ENTMCNC: 33.6 PG — SIGNIFICANT CHANGE UP (ref 27–34)
MCV RBC AUTO: 103 FL — HIGH (ref 80–100)
MONOCYTES # BLD AUTO: 0.74 K/UL — SIGNIFICANT CHANGE UP (ref 0–0.9)
MONOCYTES NFR BLD AUTO: 9.8 % — SIGNIFICANT CHANGE UP (ref 2–14)
NEUTROPHILS # BLD AUTO: 4.07 K/UL — SIGNIFICANT CHANGE UP (ref 1.8–7.4)
NEUTROPHILS NFR BLD AUTO: 53.8 % — SIGNIFICANT CHANGE UP (ref 43–77)
NRBC # FLD: 0 K/UL — SIGNIFICANT CHANGE UP (ref 0–0)
PLATELET # BLD AUTO: 367 K/UL — SIGNIFICANT CHANGE UP (ref 150–400)
PMV BLD: 12.1 FL — SIGNIFICANT CHANGE UP (ref 7–13)
POTASSIUM SERPL-MCNC: 4.6 MMOL/L — SIGNIFICANT CHANGE UP (ref 3.5–5.3)
POTASSIUM SERPL-SCNC: 4.6 MMOL/L — SIGNIFICANT CHANGE UP (ref 3.5–5.3)
PROT SERPL-MCNC: 6.4 G/DL — SIGNIFICANT CHANGE UP (ref 6–8.3)
PROTHROM AB SERPL-ACNC: 13.6 SEC — HIGH (ref 9.8–13.1)
RBC # BLD: 3.04 M/UL — LOW (ref 3.8–5.2)
RBC # FLD: 15.9 % — HIGH (ref 10.3–14.5)
RH IG SCN BLD-IMP: POSITIVE — SIGNIFICANT CHANGE UP
SODIUM SERPL-SCNC: 133 MMOL/L — LOW (ref 135–145)
WBC # BLD: 7.56 K/UL — SIGNIFICANT CHANGE UP (ref 3.8–10.5)
WBC # FLD AUTO: 7.56 K/UL — SIGNIFICANT CHANGE UP (ref 3.8–10.5)

## 2019-10-12 PROCEDURE — 93010 ELECTROCARDIOGRAM REPORT: CPT

## 2019-10-12 PROCEDURE — 99223 1ST HOSP IP/OBS HIGH 75: CPT | Mod: AI,GC

## 2019-10-12 RX ORDER — PIPERACILLIN AND TAZOBACTAM 4; .5 G/20ML; G/20ML
3.38 INJECTION, POWDER, LYOPHILIZED, FOR SOLUTION INTRAVENOUS EVERY 6 HOURS
Refills: 0 | Status: DISCONTINUED | OUTPATIENT
Start: 2019-10-12 | End: 2019-10-12

## 2019-10-12 RX ORDER — ASCORBIC ACID 60 MG
500 TABLET,CHEWABLE ORAL DAILY
Refills: 0 | Status: DISCONTINUED | OUTPATIENT
Start: 2019-10-12 | End: 2019-10-12

## 2019-10-12 RX ORDER — NYSTATIN CREAM 100000 [USP'U]/G
1 CREAM TOPICAL
Refills: 0 | Status: DISCONTINUED | OUTPATIENT
Start: 2019-10-12 | End: 2019-10-18

## 2019-10-12 RX ORDER — MIRTAZAPINE 45 MG/1
7.5 TABLET, ORALLY DISINTEGRATING ORAL DAILY
Refills: 0 | Status: DISCONTINUED | OUTPATIENT
Start: 2019-10-12 | End: 2019-10-18

## 2019-10-12 RX ORDER — PIPERACILLIN AND TAZOBACTAM 4; .5 G/20ML; G/20ML
3.38 INJECTION, POWDER, LYOPHILIZED, FOR SOLUTION INTRAVENOUS ONCE
Refills: 0 | Status: COMPLETED | OUTPATIENT
Start: 2019-10-12 | End: 2019-10-12

## 2019-10-12 RX ORDER — ACETAMINOPHEN 500 MG
650 TABLET ORAL EVERY 6 HOURS
Refills: 0 | Status: DISCONTINUED | OUTPATIENT
Start: 2019-10-12 | End: 2019-10-18

## 2019-10-12 RX ORDER — ASCORBIC ACID 60 MG
500 TABLET,CHEWABLE ORAL DAILY
Refills: 0 | Status: DISCONTINUED | OUTPATIENT
Start: 2019-10-12 | End: 2019-10-18

## 2019-10-12 RX ORDER — HEPARIN SODIUM 5000 [USP'U]/ML
5000 INJECTION INTRAVENOUS; SUBCUTANEOUS EVERY 8 HOURS
Refills: 0 | Status: DISCONTINUED | OUTPATIENT
Start: 2019-10-12 | End: 2019-10-13

## 2019-10-12 RX ORDER — MULTIVIT-MIN/FERROUS GLUCONATE 9 MG/15 ML
15 LIQUID (ML) ORAL DAILY
Refills: 0 | Status: DISCONTINUED | OUTPATIENT
Start: 2019-10-12 | End: 2019-10-13

## 2019-10-12 RX ORDER — SENNA PLUS 8.6 MG/1
2 TABLET ORAL AT BEDTIME
Refills: 0 | Status: DISCONTINUED | OUTPATIENT
Start: 2019-10-12 | End: 2019-10-18

## 2019-10-12 RX ORDER — VENLAFAXINE HCL 75 MG
37.5 CAPSULE, EXT RELEASE 24 HR ORAL DAILY
Refills: 0 | Status: DISCONTINUED | OUTPATIENT
Start: 2019-10-12 | End: 2019-10-18

## 2019-10-12 RX ORDER — SODIUM CHLORIDE 9 MG/ML
1000 INJECTION, SOLUTION INTRAVENOUS
Refills: 0 | Status: DISCONTINUED | OUTPATIENT
Start: 2019-10-12 | End: 2019-10-13

## 2019-10-12 RX ADMIN — Medication 37.5 MILLIGRAM(S): at 13:55

## 2019-10-12 RX ADMIN — SENNA PLUS 2 TABLET(S): 8.6 TABLET ORAL at 21:33

## 2019-10-12 RX ADMIN — SODIUM CHLORIDE 100 MILLILITER(S): 9 INJECTION, SOLUTION INTRAVENOUS at 21:33

## 2019-10-12 RX ADMIN — HEPARIN SODIUM 5000 UNIT(S): 5000 INJECTION INTRAVENOUS; SUBCUTANEOUS at 13:56

## 2019-10-12 RX ADMIN — HEPARIN SODIUM 5000 UNIT(S): 5000 INJECTION INTRAVENOUS; SUBCUTANEOUS at 21:33

## 2019-10-12 RX ADMIN — Medication 500 MILLIGRAM(S): at 13:55

## 2019-10-12 RX ADMIN — SODIUM CHLORIDE 100 MILLILITER(S): 9 INJECTION, SOLUTION INTRAVENOUS at 10:32

## 2019-10-12 RX ADMIN — PIPERACILLIN AND TAZOBACTAM 200 GRAM(S): 4; .5 INJECTION, POWDER, LYOPHILIZED, FOR SOLUTION INTRAVENOUS at 09:07

## 2019-10-12 RX ADMIN — NYSTATIN CREAM 1 APPLICATION(S): 100000 CREAM TOPICAL at 18:11

## 2019-10-12 RX ADMIN — MIRTAZAPINE 7.5 MILLIGRAM(S): 45 TABLET, ORALLY DISINTEGRATING ORAL at 13:55

## 2019-10-12 NOTE — ED PROVIDER NOTE - CLINICAL SUMMARY MEDICAL DECISION MAKING FREE TEXT BOX
89 yo F presenting as transfer for CBD stone for GI evaluation supposed to see ASHWINI reeder per [facility] ED notes. Plan: Monitor VS, IVF/analgesia as needed, admission for GI c/s.

## 2019-10-12 NOTE — H&P ADULT - ASSESSMENT
89F PMH Alzheimer's dementia nonverbal at baseline, s/p PEG c/b prior PEG displacements, HTN, constipation, anxiety, dysphagia, heart disease, major depressive disorder, ESBL UTI, aspiraiton PNA, coming from [facility] for 1-2 weeks of jaundice noted by family per notes from [facility] Records, with CT A/P showing 3.4mm CBD stone with CBD dilation, elevated AL-P to 1000s, with TBili 5.5 consistent with choledocholithasis with obstructing stone.

## 2019-10-12 NOTE — H&P ADULT - PROBLEM SELECTOR PLAN 6
- will need to clarify GOC with family given advanced dementia, failure to thrive - patient with MOLST form DNR/DNI in chart from January 2018 - c/w remeron 7.5 qD for decreased appetite  - currently AAOx0, nonverbal, although makes eye contact when called, at baseline

## 2019-10-12 NOTE — H&P ADULT - NSHPSOCIALHISTORY_GEN_ALL_CORE
- negative toxic habits x 3  - transferred from [facility]  - dependent in all ADL and iADLs 2/2 dementia

## 2019-10-12 NOTE — ED ADULT NURSE NOTE - INTERVENTIONS DEFINITIONS
Stretcher in lowest position, wheels locked, appropriate side rails in place/Monitor gait and stability/Non-slip footwear when patient is off stretcher

## 2019-10-12 NOTE — H&P ADULT - HISTORY OF PRESENT ILLNESS
89 yo F PMH Alzheimer's dementia nonverbal at baseline, s/p PEG c/b prior PEG displacements, HTN, constipation, anxiety, dysphagia, heart disease, major depressive disorder, ESBL UTI, aspiraiton PNA, originally brought to [facility] for 1-2 weeks of jaundice noted by family per notes from [facility]. Found to have a CBD stone with elevated alk phos and bilirubin at [facility] and transferred to see GI Dr ASHWINI Bailey. Pt unable to provide history as she is a poor historian. 89 yo F PMH Alzheimer's dementia nonverbal at baseline, s/p PEG c/b prior PEG displacements, HTN, constipation, anxiety, dysphagia, heart disease, major depressive disorder, ESBL UTI, aspiraiton PNA, originally brought to [facility] for 1-2 weeks of jaundice noted by family per notes from [facility]. Found to have a CBD stone with elevated alk phos and bilirubin at [facility] and transferred to see GI Dr ASHWINI Bailey. Pt unable to provide history as she is a poor historian.     In the ED, vitals T: 97.4, HR 90, /64, RR 18, O2 100. Patient received 1X zosyn, started on 100/hr of fluids. 89F PMH Alzheimer's dementia nonverbal at baseline, s/p PEG c/b prior PEG displacements, HTN, constipation, anxiety, dysphagia, heart disease, major depressive disorder, ESBL UTI, aspiraiton PNA, coming from [facility] for 1-2 weeks of jaundice noted by family per notes from [facility] Records. Found to have a CBD stone with elevated alk phos and bilirubin at [facility] and transferred to see her GI Dr ASHWINI Bailey, although records indicate he is at [facility]. Pt unable to provide history as she is a poor historian.     In the ED, vitals T: 97.4, HR 90, /64, RR 18, O2 100. Patient received 1X zosyn, started on 100/hr of fluids.

## 2019-10-12 NOTE — ED ADULT NURSE NOTE - OBJECTIVE STATEMENT
Patient transferred from [facility] for ERCP for stone in cbd.  She is non-verbal and non-ambulatory at baseline with history of dementia.  Patient skin is jaundice, blanchable redness noted in sacral area and stage II on heels.  Right arm contracted and bilateral leg contracted,.  Arrived with 20g IV left AC from [facility].  Unable to obtain additional information from patient.  Vitals taken, connected to cardiac monitor at sinus rhythm and will continue to monitor patient.

## 2019-10-12 NOTE — ED ADULT NURSE NOTE - CHIEF COMPLAINT QUOTE
pt received as a transfer from [facility], awake, eyes open, aphasic and contracted as baseline. pt transferred for stone in cbd, need for ercp.

## 2019-10-12 NOTE — H&P ADULT - PROBLEM SELECTOR PLAN 7
- IMPROVE score 2 (age >60, immobilization)  - HSQ, SCDs - IMPROVE score 2 (age >60, immobilization)  - HSQ q8, SCDs - patient with MOLST form DNR/DNI in chart from January 2018

## 2019-10-12 NOTE — ED PROVIDER NOTE - ATTENDING CONTRIBUTION TO CARE
MD Campuzano:  I performed a face to face bedside interview with patient regarding history of present illness, review of symptoms and past medical history. I completed an independent physical exam(documented below).  I have discussed patient's plan of care with resident.   I agree with note as stated above, having amended the EMR as needed to reflect my findings. I have discussed the assessment and plan of care.  This includes during the time I functioned as the attending physician for this patient.  PE:  Gen: Alert, NAD  Head: NC, AT,  EOMI, normal lids; scleral icterus b/l  ENT:  normal hearing, patent oropharynx without erythema/exudate  Neck: +supple, no tenderness/meningismus/JVD, +Trachea midline  Chest: no chest wall tenderness, equal chest rise  Pulm: Bilateral BS, normal resp effort, no wheeze/stridor/retractions  CV: RRR, no M/R/G, +dist pulses  Abd: +BS, soft, NT/ND  Rectal: deferred  Mskel: no edema/erythema/cyanosis  Skin: jaundiced  Neuro: Awake, alert, non-verbal, moves all extremities, follows some commands  MDM:   89yo F w/ pmh of dementia, depression, htn, PEG dependent, esbl uti, seen earlier at [facility] for jaundice X 2wks, found to have CBD stones w/ elevated alk sampson and bilirubin, transferred here for ERCP and to see Dr. Bailey (pt's GI doctor). Labs, admission.

## 2019-10-12 NOTE — H&P ADULT - PROBLEM SELECTOR PLAN 5
- c/w remeron 15 qD for increased appetite - c/w remeron 7.5 qD for decreased appetite  - currently AAOx0, nonverbal, although makes eye contact when called, at baseline - on home losartan 25  - will hold BP meds at this time in the setting of soft/target BPs off meds

## 2019-10-12 NOTE — ED ADULT TRIAGE NOTE - CHIEF COMPLAINT QUOTE
pt received as a transfer from [facility], awake, eyes open, aphasic and contracted as baseline. pt transferred for stone in cbd, need for ercp.

## 2019-10-12 NOTE — ED PROVIDER NOTE - OBJECTIVE STATEMENT
89 yo F hx dementia nonverbal at baseline originally brought to [facility] for 1-2 weeks of jaundice noted by family per notes from [facility]. Found to have a CBD stone with elevated alk phos and bilirubin at [facility] and transferred to see GI Dr ASHWINI Bailey. Pt unable to provide history.

## 2019-10-12 NOTE — H&P ADULT - PROBLEM SELECTOR PLAN 1
- found with 9 mm CBD dilation, 3.4 mm gallstone in CBD c/w obstructive choledocholithiasis   - patient follows with Dr. ASHWINI Bailey however he is domon[facility] [place]  - will c/s GI for ERCP  - currently afebrile, HD stable  - cholangitis watch, s/p zosyn, consider antibiotics as clinically necessary - found with 9 mm CBD dilation, 3.4 mm gallstone in CBD c/w obstructive choledocholithiasis   - patient follows with Dr. ASHWINI Bailey however he is [facility]on[facility] [place]  - will c/s GI for ERCP  - currently afebrile, HD stable  - cholangitis watch, s/p zosyn in ED, she is currently nontoxic, afebrile, normal WBC count, will observe off abx and restart if clinically warranted

## 2019-10-12 NOTE — H&P ADULT - NSHPLABSRESULTS_GEN_ALL_CORE
CBC Full  -  ( 12 Oct 2019 06:38 )  WBC Count : 7.56 K/uL  Hemoglobin : 10.2 g/dL  Hematocrit : 31.3 %  Platelet Count - Automated : 367 K/uL  Mean Cell Volume : 103.0 fL  Mean Cell Hemoglobin : 33.6 pg  Mean Cell Hemoglobin Concentration : 32.6 %  Auto Neutrophil # : 4.07 K/uL  Auto Lymphocyte # : 2.54 K/uL  Auto Monocyte # : 0.74 K/uL  Auto Eosinophil # : 0.16 K/uL  Auto Basophil # : 0.03 K/uL  Auto Neutrophil % : 53.8 %  Auto Lymphocyte % : 33.6 %  Auto Monocyte % : 9.8 %  Auto Eosinophil % : 2.1 %  Auto Basophil % : 0.4 %    10-12    133<L>  |  97<L>  |  19  ----------------------------<  86  4.6   |  24  |  0.52    Ca    9.3      12 Oct 2019 06:38    TPro  6.4  /  Alb  3.3  /  TBili  5.5<H>  /  DBili  x   /  AST  129<H>  /  ALT  84<H>  /  AlkPhos  1163<H>  10-12    LIVER FUNCTIONS - ( 12 Oct 2019 06:38 )  Alb: 3.3 g/dL / Pro: 6.4 g/dL / ALK PHOS: 1163 u/L / ALT: 84 u/L / AST: 129 u/L / GGT: x             PT/INR - ( 12 Oct 2019 06:38 )   PT: 13.6 SEC;   INR: 1.19          PTT - ( 12 Oct 2019 06:38 )  PTT:35.3 SEC      Imaging: FINDINGS:   Tubes, catheters and devices: Gastrostomy tube terminates in the stomach.   Lungs: Mild opacities in the lower lobes may represent atelectasis or   pneumonia.   Mediastinum: Small hiatal hernia     Liver: 4.2 x 2.2 cm ill-defined mass in the left lobe of the liver with   enhancing periphery. Rule out malignancy.   Gallbladder and bile ducts: Intrahepatic ductal dilatation. There is   dilatation   of the common duct 9 mm. 3.4 mm gallstone in the common duct consistent   with   choledocholithiasis.   Pancreas: Normal. No ductal dilation.   Spleen: Normal. No splenomegaly.   Adrenals: Normal. No mass.   Kidneys and ureters: 3.2 cm cyst in the right kidney.   Stomach and bowel: Unremarkable. No obstruction. No mucosal thickening.   Appendix: Normal appendix   Intraperitoneal space: Unremarkable. No free air. No significant fluid   collection.   Vasculature: Unremarkable. No abdominal aortic aneurysm.   Lymph nodes: Unremarkable. No enlarged lymph nodes.     Bladder: Unremarkable as visualized.   Reproductive: Surgical resection of the uterus   Bones/joints: Intramedullary adelia in the right femur. Healed fracture   right   inferior pubic ramus. . T12 compression fracture of unknown age  Soft tissues: Unremarkable.     IMPRESSION:   1. Intrahepatic ductal dilatation. There is dilatation of the common duct   9 mm.   3.4 mm gallstone in the common duct consistent with choledocholithiasis.   2. 4.2 x 2.2 cm ill-defined mass in the left lobe of the liver with   enhancing   periphery. Rule out malignancy.   3. Mild opacities in the lower lobes may represent atelectasis or   pneumonia.

## 2019-10-12 NOTE — H&P ADULT - NSHPPHYSICALEXAM_GEN_ALL_CORE
PHYSICAL EXAM:    Vital Signs Last 24 Hrs  T(C): 37.1 (12 Oct 2019 04:07), Max: 37.1 (12 Oct 2019 04:07)  T(F): 98.8 (12 Oct 2019 04:07), Max: 98.8 (12 Oct 2019 04:07)  HR: 85 (12 Oct 2019 04:07) (84 - 90)  BP: 136/71 (12 Oct 2019 04:07) (100/50 - 136/71)  BP(mean): --  RR: 18 (12 Oct 2019 04:07) (14 - 20)  SpO2: 100% (12 Oct 2019 04:07) (95% - 100%)    General: Elderly female, nonverbal, in No acute distress. diffuse jaundice of skin  HEENT: NCAT.  PERRL.  EOMI.  +SCleral icterus.  Moist MM.  No oropharyngeal exudates.    Neck: Supple.  Full ROM.  No JVD.  No thyromegaly. No lymphadenopathy.   Heart: RRR.  Normal S1 and S2.  No murmurs, rubs, or gallops.   Lungs: CTAB. No wheezes, crackles, or rhonchi.    Abdomen: BS+, soft, NT/ND.  No organomegaly.  Skin: jaundice  Extremities: No edema, clubbing, or cyanosis.  2+ peripheral pulses b/l.  Musculoskeletal: No deformities.  No spinal or paraspinal tenderness.  Neuro: A&Ox3.  CN II-XII intact.  5/5 strength in UE and LE b/l.  Tactile sensation intact in UE and LE b/l.  Cerebellar function intact

## 2019-10-12 NOTE — H&P ADULT - PROBLEM SELECTOR PLAN 2
- c/w home senna - c/w home senna via PEG - per chart, patient on Plavix 75mg for "heart disease"  - per family they are unsure whether she has h/o heart attack or stroke  - no h/o ASA use per chart and no h/o ASA allergy per chart   - will reach out to Dr. Goetz PCP on Monday 10/14 for more information

## 2019-10-13 LAB
ALBUMIN SERPL ELPH-MCNC: 3.2 G/DL — LOW (ref 3.3–5)
ALP SERPL-CCNC: 1364 U/L — HIGH (ref 40–120)
ALT FLD-CCNC: 94 U/L — HIGH (ref 4–33)
ANION GAP SERPL CALC-SCNC: 15 MMO/L — HIGH (ref 7–14)
APTT BLD: 62.3 SEC — HIGH (ref 27.5–36.3)
AST SERPL-CCNC: 169 U/L — HIGH (ref 4–32)
BASOPHILS # BLD AUTO: 0.03 K/UL — SIGNIFICANT CHANGE UP (ref 0–0.2)
BASOPHILS NFR BLD AUTO: 0.4 % — SIGNIFICANT CHANGE UP (ref 0–2)
BILIRUB SERPL-MCNC: 7.7 MG/DL — HIGH (ref 0.2–1.2)
BUN SERPL-MCNC: 12 MG/DL — SIGNIFICANT CHANGE UP (ref 7–23)
CALCIUM SERPL-MCNC: 9.6 MG/DL — SIGNIFICANT CHANGE UP (ref 8.4–10.5)
CHLORIDE SERPL-SCNC: 97 MMOL/L — LOW (ref 98–107)
CO2 SERPL-SCNC: 22 MMOL/L — SIGNIFICANT CHANGE UP (ref 22–31)
CREAT SERPL-MCNC: 0.45 MG/DL — LOW (ref 0.5–1.3)
EOSINOPHIL # BLD AUTO: 0.07 K/UL — SIGNIFICANT CHANGE UP (ref 0–0.5)
EOSINOPHIL NFR BLD AUTO: 0.9 % — SIGNIFICANT CHANGE UP (ref 0–6)
GLUCOSE SERPL-MCNC: 92 MG/DL — SIGNIFICANT CHANGE UP (ref 70–99)
HCT VFR BLD CALC: 33.8 % — LOW (ref 34.5–45)
HGB BLD-MCNC: 11.1 G/DL — LOW (ref 11.5–15.5)
IMM GRANULOCYTES NFR BLD AUTO: 0.3 % — SIGNIFICANT CHANGE UP (ref 0–1.5)
INR BLD: 1.33 — HIGH (ref 0.88–1.17)
LYMPHOCYTES # BLD AUTO: 2.22 K/UL — SIGNIFICANT CHANGE UP (ref 1–3.3)
LYMPHOCYTES # BLD AUTO: 29.2 % — SIGNIFICANT CHANGE UP (ref 13–44)
MAGNESIUM SERPL-MCNC: 2 MG/DL — SIGNIFICANT CHANGE UP (ref 1.6–2.6)
MCHC RBC-ENTMCNC: 32.8 % — SIGNIFICANT CHANGE UP (ref 32–36)
MCHC RBC-ENTMCNC: 33.2 PG — SIGNIFICANT CHANGE UP (ref 27–34)
MCV RBC AUTO: 101.2 FL — HIGH (ref 80–100)
MONOCYTES # BLD AUTO: 0.78 K/UL — SIGNIFICANT CHANGE UP (ref 0–0.9)
MONOCYTES NFR BLD AUTO: 10.3 % — SIGNIFICANT CHANGE UP (ref 2–14)
NEUTROPHILS # BLD AUTO: 4.48 K/UL — SIGNIFICANT CHANGE UP (ref 1.8–7.4)
NEUTROPHILS NFR BLD AUTO: 58.9 % — SIGNIFICANT CHANGE UP (ref 43–77)
NRBC # FLD: 0 K/UL — SIGNIFICANT CHANGE UP (ref 0–0)
PHOSPHATE SERPL-MCNC: 3 MG/DL — SIGNIFICANT CHANGE UP (ref 2.5–4.5)
PLATELET # BLD AUTO: 381 K/UL — SIGNIFICANT CHANGE UP (ref 150–400)
PMV BLD: 12.3 FL — SIGNIFICANT CHANGE UP (ref 7–13)
POTASSIUM SERPL-MCNC: 4.3 MMOL/L — SIGNIFICANT CHANGE UP (ref 3.5–5.3)
POTASSIUM SERPL-SCNC: 4.3 MMOL/L — SIGNIFICANT CHANGE UP (ref 3.5–5.3)
PROT SERPL-MCNC: 6.5 G/DL — SIGNIFICANT CHANGE UP (ref 6–8.3)
PROTHROM AB SERPL-ACNC: 14.9 SEC — HIGH (ref 9.8–13.1)
RBC # BLD: 3.34 M/UL — LOW (ref 3.8–5.2)
RBC # FLD: 15.5 % — HIGH (ref 10.3–14.5)
SODIUM SERPL-SCNC: 134 MMOL/L — LOW (ref 135–145)
VIT B12 SERPL-MCNC: > 2000 PG/ML — HIGH (ref 200–900)
WBC # BLD: 7.6 K/UL — SIGNIFICANT CHANGE UP (ref 3.8–10.5)
WBC # FLD AUTO: 7.6 K/UL — SIGNIFICANT CHANGE UP (ref 3.8–10.5)

## 2019-10-13 PROCEDURE — 99222 1ST HOSP IP/OBS MODERATE 55: CPT | Mod: GC

## 2019-10-13 PROCEDURE — 99233 SBSQ HOSP IP/OBS HIGH 50: CPT | Mod: GC

## 2019-10-13 RX ADMIN — MIRTAZAPINE 7.5 MILLIGRAM(S): 45 TABLET, ORALLY DISINTEGRATING ORAL at 13:03

## 2019-10-13 RX ADMIN — HEPARIN SODIUM 5000 UNIT(S): 5000 INJECTION INTRAVENOUS; SUBCUTANEOUS at 05:10

## 2019-10-13 RX ADMIN — Medication 37.5 MILLIGRAM(S): at 13:03

## 2019-10-13 RX ADMIN — NYSTATIN CREAM 1 APPLICATION(S): 100000 CREAM TOPICAL at 18:15

## 2019-10-13 RX ADMIN — SENNA PLUS 2 TABLET(S): 8.6 TABLET ORAL at 21:21

## 2019-10-13 RX ADMIN — Medication 500 MILLIGRAM(S): at 13:04

## 2019-10-13 RX ADMIN — NYSTATIN CREAM 1 APPLICATION(S): 100000 CREAM TOPICAL at 05:10

## 2019-10-13 RX ADMIN — SODIUM CHLORIDE 100 MILLILITER(S): 9 INJECTION, SOLUTION INTRAVENOUS at 05:11

## 2019-10-13 NOTE — PROGRESS NOTE ADULT - PROBLEM SELECTOR PLAN 1
- found with 9 mm CBD dilation, 3.4 mm gallstone in CBD c/w obstructive choledocholithiasis   - patient follows with Dr. ASHWINI Bailey however he is domon[facility] [place]  - GI consulted: plan for ERCP but timing TBD. Need cardiac clearance to hold plavix. Trend CMP, platelets, INR   - currently afebrile, HD stable  - cholangitis watch, s/p zosyn in ED, she is currently nontoxic, afebrile, normal WBC count, will observe off abx and restart if clinically warranted - found with 9 mm CBD dilation, 3.4 mm gallstone in CBD c/w obstructive choledocholithiasis. LFTs uptrending    - patient follows with Dr. ASHWINI Bailey however he is [place]  - GI consulted: Plan for ERCP; timing TBD. Ideally would wait 5 days post Plavix, but can do more urgently as needed. Make NPO @ [facility] on 10/13  - Trend CMP, platelets, INR   - currently afebrile, HD stable  - cholangitis watch, s/p zosyn in ED, she is currently nontoxic, afebrile, normal WBC count, will observe off abx and restart if clinically warranted - found with 9 mm CBD dilation, 3.4 mm gallstone in CBD c/w obstructive choledocholithiasis. LFTs uptrending    - patient follows with Dr. ASHWINI Bailey however he is [place]  - GI consulted: Plan for ERCP; timing TBD. Ideally would wait 5 days post Plavix, but can do more urgently as needed. Make NPO @ [facility] on 10/14  - Trend CMP, platelets, INR   - currently afebrile, HD stable  - cholangitis watch, s/p zosyn in ED, she is currently nontoxic, afebrile, normal WBC count, will observe off abx and restart if clinically warranted - found with 9 mm CBD dilation, 3.4 mm gallstone in CBD c/w obstructive choledocholithiasis. LFTs uptrending    - patient follows with Dr. ASHWINI Bailey however he is [place]  - GI consulted: Plan for ERCP; timing TBD. Ideally would wait 5 days post Plavix, but can do more urgently as needed. Make NPO @ [facility] on 10/14  - Trend CMP, platelets, INR   - currently afebrile, HD stable  - cholangitis watch, s/p zosyn in ED, she is currently nontoxic, afebrile, normal WBC count, will observe off abx and restart if clinically warranted  - Pt's family denies history of MI. Thus RCRI 0 points Class I Risk thus 3.9 % 30-day risk of death, MI, or cardiac arrest

## 2019-10-13 NOTE — DIETITIAN INITIAL EVALUATION ADULT. - ADD RECOMMEND
1). Monitor tolerance to enteral feeding at recommended rate. 2). Monitor weights, labs, BM's, skin integrity and edema. 3). Follow pt as per protocol.

## 2019-10-13 NOTE — DISCHARGE NOTE PROVIDER - CARE PROVIDER_API CALL
Yevgeniy Goetz)  Medicine  [address] [place] [address], [address] [place] [address]  Phone: [phone]  Fax: [phone]  Follow Up Time: 1 week

## 2019-10-13 NOTE — DIETITIAN INITIAL EVALUATION ADULT. - PERTINENT MEDS FT
MEDICATIONS  (STANDING):  ascorbic acid 500 milliGRAM(s) Oral daily  mirtazapine 7.5 milliGRAM(s) Oral daily  multivitamin/minerals/iron Oral Solution (CENTRUM) 15 milliLiter(s) Oral daily  nystatin Powder 1 Application(s) Topical two times a day  senna 2 Tablet(s) Oral at bedtime  venlafaxine 37.5 milliGRAM(s) Oral daily

## 2019-10-13 NOTE — DIETITIAN INITIAL EVALUATION ADULT. - OTHER INFO
89 y/o female with h/o Alzheimer's Dementia s/p PEG, admitted with dx of Choledocholithiasis with plan for ERCP tomorrow. Pt is a resident of [facility] center - non verbal. Spoke with RN who reports that pt is tolerating current enteral feeding. IVF of lactated ringers being infused continuously. Jevity 1.2 @ 40 ml/hr x 24 hrs is providing pt with 1152 kcals, 53 gms protein, 774 ml free H2O in a total volume of 960 ml. To better meet pt's estimated needs, recommend increasing rate of TF to 55 ml/hr x 24 hrs, which would offer pt 1584 kcals, 73 gms protein, 1065 ml free H2O in a total volume of 1320 ml/day. This would give pt 22.3 kcals/kg and 1.02 gms protein/kg of current wt. Pt is without edema or skin injuries. Pt otherwise appears well-nourished.

## 2019-10-13 NOTE — CONSULT NOTE ADULT - ASSESSMENT
89F w/ Alzheimer's dementia, hx of prior PEG placement, CAD on Plavix only, sent in from nursing facility for jaundice. Found to have elevated liver enzymes in cholestatic pattern, mildly dilated CBD, and 3.4mm obstructing gallstone.   Impression  #Obstructing gallstone - no evidence of cholangitis  #Elevated liver enzymes - cholestatic pattern, 2/2 obstructing gallstone  #Liver lesion - unclear etiology, no prior abd imaging to compare    Recommendations:  - Plan for ERCP, timing TBD  - Will need cardiology clearance to hold plavix prior to procedure  - trend CMP, plts, INR daily  - low threshold to pan-cx and  start abx (would start piperacillin-tazobactam) if any c/f infection  - [facility] will continue to follow 89F w/ Alzheimer's dementia, hx of prior PEG placement, CAD on Plavix only, sent in from nursing facility for jaundice. Found to have elevated liver enzymes in cholestatic pattern, mildly dilated CBD, and 3.4mm obstructing gallstone. Rising bilirubin but no evidence of cholangitis currently.  Impression  #Obstructing gallstone - no evidence of cholangitis  #Elevated liver enzymes - cholestatic pattern, 2/2 obstructing gallstone  #Liver lesion - unclear etiology, no prior abd imaging to compare    Recommendations:  - Plan for ERCP; timing TBD. Ideally would wait 5 days post Plavix, but can do more urgently as needed. Make NPO @ [facility] on 10/13.   - trend CMP, plts, INR daily  - low threshold to pan-cx and  start abx (would start piperacillin-tazobactam) if any c/f infection  - Please page GI (see below for pager - on until 8am Monday) at any point if patient is febrile or hypotensive and will consider emergent ERCP  - [facility] will continue to follow    Darrel Wray  Gastroenterology Fellow  Pager# [phone] or [phone]  Page on-call GI fellow after 5pm or on weekends

## 2019-10-13 NOTE — PROGRESS NOTE ADULT - ASSESSMENT
89F PMH Alzheimer's dementia nonverbal at baseline, s/p PEG c/b prior PEG displacements, HTN, constipation, anxiety, dysphagia, heart disease, major depressive disorder, ESBL UTI, aspiraiton PNA, coming from [facility] for 1-2 weeks of jaundice noted by family per notes from [facility], with CT A/P showing 3.4mm CBD stone with CBD dilation, elevated AL-P to 1000s, with TBili 5.5 consistent with choledocholithasis with obstructing stone.

## 2019-10-13 NOTE — PROGRESS NOTE ADULT - PROBLEM SELECTOR PLAN 2
- per chart, patient on Plavix 75mg for "heart disease"  - per family they are unsure whether she has h/o heart attack or stroke  - no h/o ASA use per chart and no h/o ASA allergy per chart   - will reach out to Dr. Goetz PCP on Monday 10/14 for more information - per chart, patient on Plavix 75mg for "heart disease"  - per family they are unsure whether she has h/o heart attack or stroke  - no h/o ASA use per chart and no h/o ASA allergy per chart   - will reach out to Dr. Goetz PCP on Monday 10/14 for more information  - Continue to hold Plavix

## 2019-10-13 NOTE — CONSULT NOTE ADULT - SUBJECTIVE AND OBJECTIVE BOX
Chief Complaint:  Patient is a 89y old  Female who presents with a chief complaint of CBD stone found at [facility] (12 Oct 2019 09:02)    HPI:YANETH ZENDEJAS is a 89y Female w/ PMHx of Alzheimer's (nonverbal), s/p PEG c/b multiple PEG dislodgements, CAD, [facility]  center resident, sent in for 1-2 weeks of jaundice. Pt unable to provide hx. On arrival VSS, noted to be jaundiced. Labs notable for no leukocytosis and significantly abnormal liver enzymes. TBili elevated to 5.5 (normal in 2018), AlkP 1366 -> 1163, and transaminases elevated to 175/120 -> 129/84. U/S Abd was limited, but showed mild intra and extrahepatic ductal dilatation (CBD = 9mm), and normal gall bladder. CT Abd showed an ill-defined mass in the L lobe of the liver w/ enhancing periphery, concerning for possible malignancy. The CBD was again noted to be 9mm and a 3.4mm gallstone was seen in the CBD. Pt was given 1 dose of zosyn but this was discontinued.     PMHX/PSHX:  Constipation  Depression  Hypertension  Dementia  No significant past surgical history    Allergies:  No Known Allergies    Home Medications: reviewed    Hospital Medications:  acetaminophen    Suspension .. 650 milliGRAM(s) Oral every 6 hours PRN  ascorbic acid 500 milliGRAM(s) Oral daily  heparin  Injectable 5000 Unit(s) SubCutaneous every 8 hours  lactated ringers. 1000 milliLiter(s) IV Continuous <Continuous>  mirtazapine 7.5 milliGRAM(s) Oral daily  multivitamin/minerals/iron Oral Solution (CENTRUM) 15 milliLiter(s) Oral daily  nystatin Powder 1 Application(s) Topical two times a day  senna 2 Tablet(s) Oral at bedtime  venlafaxine 37.5 milliGRAM(s) Oral daily    Social History: unable to provide    Family history: HTN (hypertension)      ROS: Unable to provide    PHYSICAL EXAM:   Vital Signs:  Vital Signs Last 24 Hrs  T(C): 36.3 (13 Oct 2019 05:09), Max: 37.4 (12 Oct 2019 15:21)  T(F): 97.4 (13 Oct 2019 05:09), Max: 99.3 (12 Oct 2019 15:21)  HR: 82 (13 Oct 2019 05:09) (82 - 90)  BP: 140/56 (13 Oct 2019 05:09) (119/61 - 140/56)  BP(mean): --  RR: 16 (13 Oct 2019 05:09) (16 - 18)  SpO2: 96% (13 Oct 2019 05:09) (96% - 100%)  Daily Height in cm: 157.48 (12 Oct 2019 10:24)    Daily     GENERAL: no acute distress  NEURO: non-verbal, not cooperative w/ exam  HEENT: ++icteric sclera, no conjunctival pallor appreciated  CHEST: no respiratory distress, no accessory muscle use, clear to auscultation bilaterally  CARDIAC: regular rate, rhythm, no mrg appreciated  ABDOMEN: soft, non-tender, non-distended, no rebound or guarding  EXTREMITIES: warm, well perfused, no edema  SKIN: +++ jaundice    LABS: reviewed                        11.1   7.60  )-----------( 381      ( 13 Oct 2019 06:10 )             33.8     10-12    133<L>  |  97<L>  |  19  ----------------------------<  86  4.6   |  24  |  0.52    Ca    9.3      12 Oct 2019 06:38    TPro  6.4  /  Alb  3.3  /  TBili  5.5<H>  /  DBili  x   /  AST  129<H>  /  ALT  84<H>  /  AlkPhos  1163<H>  10-12    LIVER FUNCTIONS - ( 12 Oct 2019 06:38 )  Alb: 3.3 g/dL / Pro: 6.4 g/dL / ALK PHOS: 1163 u/L / ALT: 84 u/L / AST: 129 u/L / GGT: x           Diagnostic Studies: see sunrise for full report

## 2019-10-13 NOTE — PROGRESS NOTE ADULT - PROBLEM SELECTOR PLAN 8
- IMPROVE score 2 (age >60, immobilization)  - HSQ q8, SCDs  - Diet: NPO w/ tube feeds  - Dispo: pending clinical improvement - IMPROVE score 2 (age >60, immobilization)  - Will hold heparin SQ pending ERCP tomorrow, SCDs  - Diet: NPO w/ tube feeds. NPO after midnight on 10/14 for ERCP tomorrow   - Dispo: pending clinical improvement

## 2019-10-13 NOTE — PROGRESS NOTE ADULT - SUBJECTIVE AND OBJECTIVE BOX
Harmony Salazar MD  [facility]  Pager: [phone] / [phone]     Patient is a 89y old  Female who presents with a chief complaint of CBD stone found at [place] (13 Oct 2019 07:09)      SUBJECTIVE / OVERNIGHT EVENTS:  Patient seen and examined at bedside. No acute events overnight.     Other Review of Systems Negative.    MEDICATIONS  (STANDING):  ascorbic acid 500 milliGRAM(s) Oral daily  heparin  Injectable 5000 Unit(s) SubCutaneous every 8 hours  lactated ringers. 1000 milliLiter(s) (100 mL/Hr) IV Continuous <Continuous>  mirtazapine 7.5 milliGRAM(s) Oral daily  multivitamin/minerals/iron Oral Solution (CENTRUM) 15 milliLiter(s) Oral daily  nystatin Powder 1 Application(s) Topical two times a day  senna 2 Tablet(s) Oral at bedtime  venlafaxine 37.5 milliGRAM(s) Oral daily    MEDICATIONS  (PRN):  acetaminophen    Suspension .. 650 milliGRAM(s) Oral every 6 hours PRN Temp greater or equal to 38C (100.4F), Mild Pain (1 - 3)      OBJECTIVE:    Vital Signs Last 24 Hrs  T(C): 36.3 (13 Oct 2019 05:09), Max: 37.4 (12 Oct 2019 15:21)  T(F): 97.4 (13 Oct 2019 05:09), Max: 99.3 (12 Oct 2019 15:21)  HR: 82 (13 Oct 2019 05:09) (82 - 90)  BP: 140/56 (13 Oct 2019 05:09) (119/61 - 140/56)  BP(mean): --  RR: 16 (13 Oct 2019 05:09) (16 - 18)  SpO2: 96% (13 Oct 2019 05:09) (96% - 100%)    CAPILLARY BLOOD GLUCOSE        I&O's Summary    12 Oct 2019 07:01  -  13 Oct 2019 07:00  --------------------------------------------------------  IN: 837 mL / OUT: 850 mL / NET: -13 mL      Physical Exam:   General: Elderly female, nonverbal, in No acute distress. diffuse jaundice of skin  	HEENT: NCAT.  PERRL.  EOMI.  +SCleral icterus.  Moist MM.  No oropharyngeal exudates.    	Neck: Supple.  Full ROM.  No JVD.  No thyromegaly. No lymphadenopathy.   	Heart: RRR.  Normal S1 and S2.  No murmurs, rubs, or gallops.   	Lungs: CTAB. No wheezes, crackles, or rhonchi.    	Abdomen: BS+, soft, NT/ND.  No organomegaly.  	Skin: jaundice  	Extremities: No edema, clubbing, or cyanosis.  2+ peripheral pulses b/l.  	Musculoskeletal: No deformities.  No spinal or paraspinal tenderness.  Neuro: A&Ox3.  CN II-XII intact.  5/5 strength in UE and LE b/l.  Tactile sensation intact in UE and LE b/l.  Cerebellar function intact      LABS:                        11.1   7.60  )-----------( 381      ( 13 Oct 2019 06:10 )             33.8     Auto Eosinophil # 0.07  / Auto Eosinophil % 0.9   / Auto Neutrophil # 4.48  / Auto Neutrophil % 58.9  / BANDS % x                            10.2   7.56  )-----------( 367      ( 12 Oct 2019 06:38 )             31.3     Auto Eosinophil # 0.16  / Auto Eosinophil % 2.1   / Auto Neutrophil # 4.07  / Auto Neutrophil % 53.8  / BANDS % x                            11.6   8.91  )-----------( 386      ( 11 Oct 2019 17:09 )             35.4     Auto Eosinophil # 0.25  / Auto Eosinophil % 2.8   / Auto Neutrophil # 5.02  / Auto Neutrophil % 56.4  / BANDS % x        10-13    134<L>  |  97<L>  |  12  ----------------------------<  92  4.3   |  22  |  0.45<L>  10-12    133<L>  |  97<L>  |  19  ----------------------------<  86  4.6   |  24  |  0.52  10-11    132<L>  |  99  |  21<H>  ----------------------------<  89  4.3   |  28  |  0.53    Ca    9.6      13 Oct 2019 06:10  Mg     2.0     10-13  Phos  3.0     10-13  TPro  6.5  /  Alb  3.2<L>  /  TBili  7.7<H>  /  DBili  x   /  AST  169<H>  /  ALT  94<H>  /  AlkPhos  1364<H>  10-13  TPro  6.4  /  Alb  3.3  /  TBili  5.5<H>  /  DBili  x   /  AST  129<H>  /  ALT  84<H>  /  AlkPhos  1163<H>  10-12  TPro  7.1  /  Alb  2.7<L>  /  TBili  5.5<H>  /  DBili  4.7<H>  /  AST  175<H>  /  ALT  120<H>  /  AlkPhos  1366<H>  10-11    PT/INR - ( 13 Oct 2019 06:10 )   PT: 14.9 SEC;   INR: 1.33          PTT - ( 13 Oct 2019 06:10 )  PTT:62.3 SEC    CT abd/p:     	IMPRESSION:   	1. Intrahepatic ductal dilatation. There is dilatation of the common duct   	9 mm.   	3.4 mm gallstone in the common duct consistent with choledocholithiasis.   	2. 4.2 x 2.2 cm ill-defined mass in the left lobe of the liver with   	enhancing   	periphery. Rule out malignancy.   	3. Mild opacities in the lower lobes may represent atelectasis or   pneumonia.      Care Discussed with Consultants/Other Providers: Yes, GI     RADIOLOGY & ADDITIONAL TESTS: Yes   (Imaging Personally Reviewed) Harmony Salazar MD  [facility]  Pager: [phone] / IDbyME [phone]     Patient is a 89y old  Female who presents with a chief complaint of CBD stone found at [place] (13 Oct 2019 07:09)      SUBJECTIVE / OVERNIGHT EVENTS:  Patient seen and examined at bedside. No acute events overnight. Pt nonverbal.     Review of Systems unable to obtain, pt nonverbal     MEDICATIONS  (STANDING):  ascorbic acid 500 milliGRAM(s) Oral daily  lactated ringers. 1000 milliLiter(s) (100 mL/Hr) IV Continuous <Continuous>  mirtazapine 7.5 milliGRAM(s) Oral daily  multivitamin/minerals/iron Oral Solution (CENTRUM) 15 milliLiter(s) Oral daily  nystatin Powder 1 Application(s) Topical two times a day  senna 2 Tablet(s) Oral at bedtime  venlafaxine 37.5 milliGRAM(s) Oral daily    MEDICATIONS  (PRN):  acetaminophen    Suspension .. 650 milliGRAM(s) Oral every 6 hours PRN Temp greater or equal to 38C (100.4F), Mild Pain (1 - 3)      OBJECTIVE:    Vital Signs Last 24 Hrs  T(C): 36.3 (13 Oct 2019 05:09), Max: 37.4 (12 Oct 2019 15:21)  T(F): 97.4 (13 Oct 2019 05:09), Max: 99.3 (12 Oct 2019 15:21)  HR: 82 (13 Oct 2019 05:09) (82 - 90)  BP: 140/56 (13 Oct 2019 05:09) (119/61 - 140/56)  BP(mean): --  RR: 16 (13 Oct 2019 05:09) (16 - 18)  SpO2: 96% (13 Oct 2019 05:09) (96% - 100%)    CAPILLARY BLOOD GLUCOSE        I&O's Summary    12 Oct 2019 07:01  -  13 Oct 2019 07:00  --------------------------------------------------------  IN: 837 mL / OUT: 850 mL / NET: -13 mL      Physical Exam:   General: Elderly female, nonverbal, in No acute distress. diffuse jaundice of skin  	HEENT: NCAT.  PERRL.  EOMI.  +Scleral icterus.  Moist MM.  No oropharyngeal exudates.    	Neck: Supple.  Full ROM.  No JVD.  No thyromegaly. No lymphadenopathy.   	Heart: RRR.  Normal S1 and S2.  No murmurs, rubs, or gallops.   	Lungs: CTAB. No wheezes, crackles, or rhonchi.    	Abdomen: BS+, soft, epigastric and RUQ tenderness. No rebound/guarding. No organomegaly.  	Skin: jaundice  	Extremities: No edema, clubbing, or cyanosis.  2+ peripheral pulses b/l.  	Musculoskeletal: No deformities.  No spinal or paraspinal tenderness.  Neuro: A&Ox3.  CN II-XII intact.  5/5 strength in UE and LE b/l.  Tactile sensation intact in UE and LE b/l.  Cerebellar function intact      LABS:                                      11.1   7.60  )-----------( 381      ( 13 Oct 2019 06:10 )             33.8     10-13    134<L>  |  97<L>  |  12  ----------------------------<  92  4.3   |  22  |  0.45<L>    Ca    9.6      13 Oct 2019 06:10  Phos  3.0     10-13  Mg     2.0     10-13    TPro  6.5  /  Alb  3.2<L>  /  TBili  7.7<H>  /  DBili  x   /  AST  169<H>  /  ALT  94<H>  /  AlkPhos  1364<H>  10-13    Prothrombin Time and INR, Plasma in AM (10.13.19 @ 06:10)    Prothrombin Time, Plasma: 14.9 SEC    INR: 1.33: RECOMMENDED RANGES FOR THERAPEUTIC INR:    2.0-3.0 FOR MOST MEDICAL AND SURGICAL THROMBOEMBOLIC STATES  2.0-3.0 FOR ATRIAL FIBRILLATION  2.0-3.0 FOR BILEAFLET MECHANICAL VALVE IN AORTIC POSITION  2.5-3.5 FOR MECHANICAL HEART VALVES    CHEST 2004;126:[id number]    THE PRESENCE OF DIRECT THROMBIN INHIBITORS (ARGATROBAN,  REFLUDAN) MAY FALSELY INCREASE RESULTS.      CT abd/p:     	IMPRESSION:   	1. Intrahepatic ductal dilatation. There is dilatation of the common duct   	9 mm.   	3.4 mm gallstone in the common duct consistent with choledocholithiasis.   	2. 4.2 x 2.2 cm ill-defined mass in the left lobe of the liver with   	enhancing   	periphery. Rule out malignancy.   	3. Mild opacities in the lower lobes may represent atelectasis or   pneumonia.      Care Discussed with Consultants/Other Providers: Yes, GI     RADIOLOGY & ADDITIONAL TESTS: Yes   (Imaging Personally Reviewed)

## 2019-10-13 NOTE — PROGRESS NOTE ADULT - ATTENDING COMMENTS
Pt was seen and examined in AM. Pt is nonverbal, bedbound, open eyes to voice. + jaundice.   obstructive gallstone, no signs of acute cholangitis as of now. libia trending up. Pt is acutely ill.   f/u GI for ERCP. If fever or unstable, will start abx STAT and consider emergent ERCP  monitor closely

## 2019-10-13 NOTE — DISCHARGE NOTE PROVIDER - NSDCFUADDAPPT_GEN_ALL_CORE_FT
Please follow up with your PCP Dr. Goetz in 1 week. Please call [phone] to set up an appointment.

## 2019-10-13 NOTE — DISCHARGE NOTE PROVIDER - NSDCCPCAREPLAN_GEN_ALL_CORE_FT
PRINCIPAL DISCHARGE DIAGNOSIS  Diagnosis: Choledocholithiasis  Assessment and Plan of Treatment: You came in with yellowing of the skin and eyes. A CAT scan of the abdomen showed duct dilation and a gallstone in the common bile duct. You were admitted for an obstructing bile duct stone. You were seen by the Gastroenterologist. They did an ERCP, where they... PRINCIPAL DISCHARGE DIAGNOSIS  Diagnosis: Choledocholithiasis  Assessment and Plan of Treatment: You came in with yellowing of the skin and eyes. A CAT scan of the abdomen showed duct dilation and a gallstone in the common bile duct. You were admitted for an obstructing bile duct stone. You were seen by the Gastroenterologist, but refused an ERCP to relieve the obstruction. A hospice consult was placed to make you comfortable. PRINCIPAL DISCHARGE DIAGNOSIS  Diagnosis: Choledocholithiasis  Assessment and Plan of Treatment: You came in with yellowing of the skin and eyes. A CAT scan of the abdomen showed duct dilation and a gallstone in the common bile duct. You were admitted for an obstructing bile duct stone. You were seen by the Gastroenterologist, but refused an ERCP to relieve the obstruction. A hospice consult was placed and your daughter agreed to have you transferred back to [place] for hospice services.

## 2019-10-13 NOTE — DISCHARGE NOTE PROVIDER - HOSPITAL COURSE
89F [facility] Alzheimer's dementia nonverbal at baseline, s/p PEG c/b prior PEG displacements, HTN, constipation, anxiety, dysphagia, heart disease, major depressive disorder, ESBL UTI, aspiraiton PNA, coming from [facility] for 1-2 weeks of jaundice noted by family per notes from [facility] Records. Found to have a CBD stone with elevated alk phos and bilirubin at [facility] and transferred to see her GI Dr ASHWINI Bailey, although records indicate he is at [facility]. Pt unable to provide history as she is a poor historian.         In the ED, vitals T: 97.4, HR 90, /64, RR 18, O2 100. Patient received 1X zosyn, started on 100/hr of fluids.             	IMPRESSION:     	1. Intrahepatic ductal dilatation. There is dilatation of the common duct     	9 mm.     	3.4 mm gallstone in the common duct consistent with choledocholithiasis.     	2. 4.2 x 2.2 cm ill-defined mass in the left lobe of the liver with     	enhancing     	periphery. Rule out malignancy.     	3. Mild opac        89F [facility] Alzheimer's dementia nonverbal at baseline, s/p PEG c/b prior PEG displacements, HTN, constipation, anxiety, dysphagia, heart disease, major depressive disorder, ESBL UTI, aspiraiton PNA, coming from [facility] for 1-2 weeks of jaundice noted by family per notes from [facility] Records, with CT A/P showing 3.4mm CBD stone with CBD dilation, elevated AL-P to 1000s, with TBili 5.5 consistent with choledocholithasis with obstructing stone.         89F [facility] Alzheimer's dementia nonverbal at baseline, s/p PEG c/b prior PEG displacements, HTN, constipation, anxiety, dysphagia, heart disease, major depressive disorder, ESBL UTI, aspiraiton PNA, coming from [facility] for 1-2 weeks of jaundice noted by family per notes from [facility] Records, with CT A/P showing 3.4mm CBD stone with CBD dilation, elevated AL-P to 1000s, with TBili 5.5 consistent with choledocholithasis with obstructing stone.          Problem/Plan - 1:    ·  Problem: Choledocholithiasis.  Plan: - found with 9 mm CBD dilation, 3.4 mm gallstone in CBD c/w obstructive choledocholithiasis. LFTs uptrending      - patient follows with Dr. ASHWINI Bailey however he is [place]    - GI consulted: Plan for ERCP; timing TBD. Ideally would wait 5 days post Plavix, but can do more urgently as needed. Make NPO @ [facility] on 10/14    - Trend CMP, platelets, INR     - currently afebrile, HD stable    - cholangitis watch, s/p zosyn in ED, she is currently nontoxic, afebrile, normal WBC count, will observe off abx and restart if clinically warranted    - Pt's family denies history of MI. Thus RCRI 0 points Class I Risk thus 3.9 % 30-day risk of death, MI, or cardiac arrest.             Problem/Plan - 2:    ·  Problem: Heart disease.  Plan: - per chart, patient on Plavix 75mg for "heart disease"    - per family they are unsure whether she has h/o heart attack or stroke    - no h/o ASA use per chart and no h/o ASA allergy per chart     - will reach out to Dr. Goetz PCP on Monday 10/14 for more information    - Continue to hold Plavix.          Problem/Plan - 3:    ·  Problem: Constipation.  Plan: - c/w home senna via PEG.          Problem/Plan - 4:    ·  Problem: Depression.  Plan: - c/w home venlafaxine.          Problem/Plan - 5:    ·  Problem: Hypertension.  Plan: - on home losartan 25    - will hold BP meds at this time in the setting of soft/target BPs off meds.          Problem/Plan - 6:    Problem: Dementia. Plan: - c/w remeron 7.5 qD for decreased appetite    - currently AAOx0, nonverbal, although makes eye contact when called, at baseline.         Problem/Plan - 7:    ·  Problem: Goals of care, counseling/discussion.  Plan: - patient with MOLST form DNR/DNI in chart from January 2018.          Problem/Plan - 8:    ·  Problem: Prophylactic measure.  Plan: - IMPROVE score 2 (age >60, immobilization)    - Will hold heparin SQ pending ERCP tomorrow, SCDs    - Diet: NPO w/ tube feeds. NPO after midnight on 10/14 for ERCP tomorrow     - Dispo: pending clinical improvement. The pt is a 89F [facility] Alzheimer's dementia nonverbal at baseline, s/p PEG c/b prior PEG displacements, HTN, constipation, anxiety, dysphagia, heart disease, major depressive disorder, ESBL UTI, aspiraiton PNA, coming from [facility] for 1-2 weeks of jaundice noted by family per notes from [facility] Records. Found to have a CBD stone with elevated alk phos and bilirubin at [facility] and transferred to see her GI Dr ASHWINI Bailey, although records indicate he is at [facility]. Pt unable to provide history as she is a poor historian.         In the ED, vitals T: 97.4, HR 90, /64, RR 18, O2 100. Patient received 1X zosyn, started on 100/hr of fluids.             	IMPRESSION:     	1. Intrahepatic ductal dilatation. There is dilatation of the common duct     	9 mm.     	3.4 mm gallstone in the common duct consistent with choledocholithiasis.     	2. 4.2 x 2.2 cm ill-defined mass in the left lobe of the liver with     	enhancing     	periphery. Rule out malignancy.     	3. Mild opac        89F [facility] Alzheimer's dementia nonverbal at baseline, s/p PEG c/b prior PEG displacements, HTN, constipation, anxiety, dysphagia, heart disease, major depressive disorder, ESBL UTI, aspiraiton PNA, coming from [facility] for 1-2 weeks of jaundice noted by family per notes from [facility] Records, with CT A/P showing 3.4mm CBD stone with CBD dilation, elevated AL-P to 1000s, with TBili 5.5 consistent with choledocholithasis with obstructing stone.         89F [facility] Alzheimer's dementia nonverbal at baseline, s/p PEG c/b prior PEG displacements, HTN, constipation, anxiety, dysphagia, heart disease, major depressive disorder, ESBL UTI, aspiraiton PNA, coming from [facility] for 1-2 weeks of jaundice noted by family per notes from [facility] Records, with CT A/P showing 3.4mm CBD stone with CBD dilation, elevated AL-P to 1000s, with TBili 5.5 consistent with choledocholithasis with obstructing stone.          Problem/Plan - 1:    ·  Problem: Choledocholithiasis.  Plan: - found with 9 mm CBD dilation, 3.4 mm gallstone in CBD c/w obstructive choledocholithiasis. LFTs uptrending      - patient follows with Dr. ASHWINI Bailey however he is [place]    - GI consulted: Plan for ERCP; timing TBD. Ideally would wait 5 days post Plavix, but can do more urgently as needed. Make NPO @ [facility] on 10/14    - Trend CMP, platelets, INR     - currently afebrile, HD stable    - cholangitis watch, s/p zosyn in ED, she is currently nontoxic, afebrile, normal WBC count, will observe off abx and restart if clinically warranted    - Pt's family denies history of MI. Thus RCRI 0 points Class I Risk thus 3.9 % 30-day risk of death, MI, or cardiac arrest.             Problem/Plan - 2:    ·  Problem: Heart disease.  Plan: - per chart, patient on Plavix 75mg for "heart disease"    - per family they are unsure whether she has h/o heart attack or stroke    - no h/o ASA use per chart and no h/o ASA allergy per chart     - will reach out to Dr. Goetz PCP on Monday 10/14 for more information    - Continue to hold Plavix.          Problem/Plan - 3:    ·  Problem: Constipation.  Plan: - c/w home senna via PEG.          Problem/Plan - 4:    ·  Problem: Depression.  Plan: - c/w home venlafaxine.          Problem/Plan - 5:    ·  Problem: Hypertension.  Plan: - on home losartan 25    - will hold BP meds at this time in the setting of soft/target BPs off meds.          Problem/Plan - 6:    Problem: Dementia. Plan: - c/w remeron 7.5 qD for decreased appetite    - currently AAOx0, nonverbal, although makes eye contact when called, at baseline.         Problem/Plan - 7:    ·  Problem: Goals of care, counseling/discussion.  Plan: - patient with MOLST form DNR/DNI in chart from January 2018.          Problem/Plan - 8:    ·  Problem: Prophylactic measure.  Plan: - IMPROVE score 2 (age >60, immobilization)    - Will hold heparin SQ pending ERCP tomorrow, SCDs    - Diet: NPO w/ tube feeds. NPO after midnight on 10/14 for ERCP tomorrow     - Dispo: pending clinical improvement. The pt is a 89F [facility] Alzheimer's dementia nonverbal at baseline, s/p PEG c/b prior PEG displacements, HTN, constipation, anxiety, dysphagia, heart disease, major depressive disorder, ESBL UTI, aspiraiton PNA, coming from [facility] for 1-2 weeks of jaundice noted by family per notes from [place] [facility]. Found to have a CBD stone with elevated alk phos and bilirubin at [facility] and transferred to see her GI Dr ASHWINI Bailey, although records indicate he is at [place]. Pt unable to provide history as she is a poor historian. In the ED, vitals T: 97.4, HR 90, /64, RR 18, O2 100. Patient received 1X zosyn, started on 100/hr of fluids. CT abd/p showed intrahepatic ductal dilation, dilation of the common duct 9 mm, 3.4 mm gallstone in the common duct consistent with choledocholithiasis, and 4.2 x 2.2 cm ill-defined mass in the left lobe of the liver with enhancing periphery rule out malignancy. Pt also w/ elevated alk phos to 1000s and total bilirubin 5.5, admitted for choledocholithasis with obstructing stone. GI was consulted, and they planned to do ERCP. Pt was afebrile and hemodynamically stable, but was carefully monitored for toxic signs. As per charbel, pt is on plavic 75 mg for "heart disease," but family is unsure whether she had a history of MI or CVA.                      Problem/Plan - 2:    ·  Problem: Heart disease.  Plan: - per chart, patient on Plavix 75mg for "heart disease"    - per family they are unsure whether she has h/o heart attack or stroke    - no h/o ASA use per chart and no h/o ASA allergy per chart     - will reach out to Dr. Goetz PCP on Monday 10/14 for more information    - Continue to hold Plavix.          Problem/Plan - 3:    ·  Problem: Constipation.  Plan: - c/w home senna via PEG.          Problem/Plan - 4:    ·  Problem: Depression.  Plan: - c/w home venlafaxine.                  Problem/Plan - 5:    ·  Problem: Hypertension.  Plan: - on home losartan 25    - will hold BP meds at this time in the setting of soft/target BPs off meds.          Problem/Plan - 6:    Problem: Dementia. Plan: - c/w remeron 7.5 qD for decreased appetite    - currently AAOx0, nonverbal, although makes eye contact when called, at baseline.         Problem/Plan - 7:    ·  Problem: Goals of care, counseling/discussion.  Plan: - patient with MOLST form DNR/DNI in chart from January 2018.          Problem/Plan - 8:    ·  Problem: Prophylactic measure.  Plan: - IMPROVE score 2 (age >60, immobilization)    - Will hold heparin SQ pending ERCP tomorrow, SCDs    - Diet: NPO w/ tube feeds. NPO after midnight on 10/14 for ERCP tomorrow     - Dispo: pending clinical improvement. The pt is a 89F [facility] Alzheimer's dementia nonverbal at baseline, s/p PEG c/b prior PEG displacements, HTN, constipation, anxiety, dysphagia, heart disease, major depressive disorder, ESBL UTI, aspiraiton PNA, coming from [facility] for 1-2 weeks of jaundice noted by family per notes from [place] Records. Found to have a CBD stone with elevated alk phos and bilirubin at [facility] and transferred to see her GI Dr ASHWINI Bailey, although records indicate he is at [place]. Pt unable to provide history as she is a poor historian. In the ED, vitals T: 97.4, HR 90, /64, RR 18, O2 100. Patient received 1X zosyn, started on 100/hr of fluids. CT abd/p showed intrahepatic ductal dilation, dilation of the common duct 9 mm, 3.4 mm gallstone in the common duct consistent with choledocholithiasis, and 4.2 x 2.2 cm ill-defined mass in the left lobe of the liver with enhancing periphery rule out malignancy. Pt also w/ elevated alk phos to 1000s and total bilirubin 5.5, admitted for choledocholithasis with obstructing stone. GI was consulted, and they planned to do ERCP. Pt was afebrile and hemodynamically stable, but was carefully monitored for toxic signs. As per chart, pt is on plavix 75 mg for "heart disease," but family is unsure whether she had a history of MI or CVA. There is no history of ASA use per chart and no history of ASA allergy per chart. We called her PCP Dr. Goetz to obtain more information; in the meantime, we held her Plavix. The pt is a 89F [facility] Alzheimer's dementia nonverbal at baseline, s/p PEG c/b prior PEG displacements, HTN, constipation, anxiety, dysphagia, heart disease, major depressive disorder, ESBL UTI, aspiraiton PNA, coming from [facility] for 1-2 weeks of jaundice noted by family per notes from [place] [facility]. Found to have a CBD stone with elevated alk phos and bilirubin at [facility] and transferred to see her GI Dr ASHWINI Bailey, although records indicate he is at [place]. Pt unable to provide history as she is a poor historian. In the ED, vitals T: 97.4, HR 90, /64, RR 18, O2 100. Patient received 1X zosyn, started on 100/hr of fluids. CT abd/p showed intrahepatic ductal dilation, dilation of the common duct 9 mm, 3.4 mm gallstone in the common duct consistent with choledocholithiasis, and 4.2 x 2.2 cm ill-defined mass in the left lobe of the liver with enhancing periphery rule out malignancy. Pt also w/ elevated alk phos to 1000s and total bilirubin 5.5, admitted for choledocholithasis with obstructing stone. GI was consulted, and they planned to do ERCP. Pt was afebrile and hemodynamically stable, but was carefully monitored for toxic signs. As per chart, pt is on plavix 75 mg for "heart disease," but family is unsure whether she had a history of MI or CVA. There is no history of ASA use per chart and no history of ASA allergy per chart. We called her PCP Dr. Goetz to obtain more information but he did now know why pt was on plavix. We withheld it for an ERCP, but the HCP refused ot have it done and requested a hospice consult. The pt is a 89F [facility] Alzheimer's dementia nonverbal at baseline, s/p PEG c/b prior PEG displacements, HTN, constipation, anxiety, dysphagia, heart disease, major depressive disorder, ESBL UTI, aspiraiton PNA, coming from [facility] for 1-2 weeks of jaundice noted by family per notes from [place] [facility]. Found to have a CBD stone with elevated alk phos and bilirubin at [facility] and transferred to see her GI Dr ASHWINI Bailey, although records indicate he is at [place]. Pt unable to provide history as she is a poor historian. In the ED, vitals T: 97.4, HR 90, /64, RR 18, O2 100. Patient received 1X zosyn, started on 100/hr of fluids. CT abd/p showed intrahepatic ductal dilation, dilation of the common duct 9 mm, 3.4 mm gallstone in the common duct consistent with choledocholithiasis, and 4.2 x 2.2 cm ill-defined mass in the left lobe of the liver with enhancing periphery rule out malignancy. Pt also w/ elevated alk phos to 1000s and total bilirubin 5.5, admitted for choledocholithasis with obstructing stone. GI was consulted, and they planned to do ERCP. Pt was afebrile and hemodynamically stable, but was carefully monitored for toxic signs. As per chart, pt is on plavix 75 mg for "heart disease," but family is unsure whether she had a history of MI or CVA. There is no history of ASA use per chart and no history of ASA allergy per chart. We called her PCP Dr. Goetz to obtain more information but he did now know why pt was on plavix. We withheld it for an ERCP, but the HCP refused to have it done and requested a hospice consult. She shubham be transferred back to [place] to be admitted to the hospice unit.

## 2019-10-14 LAB
ALBUMIN SERPL ELPH-MCNC: 3.3 G/DL — SIGNIFICANT CHANGE UP (ref 3.3–5)
ALP SERPL-CCNC: 1410 U/L — HIGH (ref 40–120)
ALT FLD-CCNC: 115 U/L — HIGH (ref 4–33)
ANION GAP SERPL CALC-SCNC: 14 MMO/L — SIGNIFICANT CHANGE UP (ref 7–14)
APTT BLD: 37.5 SEC — HIGH (ref 27.5–36.3)
AST SERPL-CCNC: 207 U/L — HIGH (ref 4–32)
BASOPHILS # BLD AUTO: 0.02 K/UL — SIGNIFICANT CHANGE UP (ref 0–0.2)
BASOPHILS NFR BLD AUTO: 0.3 % — SIGNIFICANT CHANGE UP (ref 0–2)
BILIRUB SERPL-MCNC: 8.8 MG/DL — HIGH (ref 0.2–1.2)
BLD GP AB SCN SERPL QL: NEGATIVE — SIGNIFICANT CHANGE UP
BUN SERPL-MCNC: 9 MG/DL — SIGNIFICANT CHANGE UP (ref 7–23)
CALCIUM SERPL-MCNC: 9.4 MG/DL — SIGNIFICANT CHANGE UP (ref 8.4–10.5)
CHLORIDE SERPL-SCNC: 97 MMOL/L — LOW (ref 98–107)
CO2 SERPL-SCNC: 22 MMOL/L — SIGNIFICANT CHANGE UP (ref 22–31)
CREAT SERPL-MCNC: 0.45 MG/DL — LOW (ref 0.5–1.3)
EOSINOPHIL # BLD AUTO: 0.09 K/UL — SIGNIFICANT CHANGE UP (ref 0–0.5)
EOSINOPHIL NFR BLD AUTO: 1.3 % — SIGNIFICANT CHANGE UP (ref 0–6)
GLUCOSE SERPL-MCNC: 108 MG/DL — HIGH (ref 70–99)
HCT VFR BLD CALC: 34.1 % — LOW (ref 34.5–45)
HGB BLD-MCNC: 10.8 G/DL — LOW (ref 11.5–15.5)
IMM GRANULOCYTES NFR BLD AUTO: 0.4 % — SIGNIFICANT CHANGE UP (ref 0–1.5)
INR BLD: 1.23 — HIGH (ref 0.88–1.17)
LYMPHOCYTES # BLD AUTO: 2.37 K/UL — SIGNIFICANT CHANGE UP (ref 1–3.3)
LYMPHOCYTES # BLD AUTO: 34.9 % — SIGNIFICANT CHANGE UP (ref 13–44)
MAGNESIUM SERPL-MCNC: 2 MG/DL — SIGNIFICANT CHANGE UP (ref 1.6–2.6)
MCHC RBC-ENTMCNC: 31.7 % — LOW (ref 32–36)
MCHC RBC-ENTMCNC: 32.6 PG — SIGNIFICANT CHANGE UP (ref 27–34)
MCV RBC AUTO: 103 FL — HIGH (ref 80–100)
MONOCYTES # BLD AUTO: 0.63 K/UL — SIGNIFICANT CHANGE UP (ref 0–0.9)
MONOCYTES NFR BLD AUTO: 9.3 % — SIGNIFICANT CHANGE UP (ref 2–14)
NEUTROPHILS # BLD AUTO: 3.65 K/UL — SIGNIFICANT CHANGE UP (ref 1.8–7.4)
NEUTROPHILS NFR BLD AUTO: 53.8 % — SIGNIFICANT CHANGE UP (ref 43–77)
NRBC # FLD: 0 K/UL — SIGNIFICANT CHANGE UP (ref 0–0)
PHOSPHATE SERPL-MCNC: 2.8 MG/DL — SIGNIFICANT CHANGE UP (ref 2.5–4.5)
PLATELET # BLD AUTO: 354 K/UL — SIGNIFICANT CHANGE UP (ref 150–400)
PMV BLD: 12.5 FL — SIGNIFICANT CHANGE UP (ref 7–13)
POTASSIUM SERPL-MCNC: 4.1 MMOL/L — SIGNIFICANT CHANGE UP (ref 3.5–5.3)
POTASSIUM SERPL-SCNC: 4.1 MMOL/L — SIGNIFICANT CHANGE UP (ref 3.5–5.3)
PROT SERPL-MCNC: 6.3 G/DL — SIGNIFICANT CHANGE UP (ref 6–8.3)
PROTHROM AB SERPL-ACNC: 14.1 SEC — HIGH (ref 9.8–13.1)
RBC # BLD: 3.31 M/UL — LOW (ref 3.8–5.2)
RBC # FLD: 15.5 % — HIGH (ref 10.3–14.5)
RH IG SCN BLD-IMP: POSITIVE — SIGNIFICANT CHANGE UP
SODIUM SERPL-SCNC: 133 MMOL/L — LOW (ref 135–145)
WBC # BLD: 6.79 K/UL — SIGNIFICANT CHANGE UP (ref 3.8–10.5)
WBC # FLD AUTO: 6.79 K/UL — SIGNIFICANT CHANGE UP (ref 3.8–10.5)

## 2019-10-14 PROCEDURE — 99232 SBSQ HOSP IP/OBS MODERATE 35: CPT | Mod: GC

## 2019-10-14 PROCEDURE — 99233 SBSQ HOSP IP/OBS HIGH 50: CPT | Mod: GC

## 2019-10-14 RX ORDER — HEPARIN SODIUM 5000 [USP'U]/ML
5000 INJECTION INTRAVENOUS; SUBCUTANEOUS EVERY 8 HOURS
Refills: 0 | Status: DISCONTINUED | OUTPATIENT
Start: 2019-10-14 | End: 2019-10-18

## 2019-10-14 RX ADMIN — NYSTATIN CREAM 1 APPLICATION(S): 100000 CREAM TOPICAL at 17:09

## 2019-10-14 RX ADMIN — MIRTAZAPINE 7.5 MILLIGRAM(S): 45 TABLET, ORALLY DISINTEGRATING ORAL at 12:41

## 2019-10-14 RX ADMIN — SENNA PLUS 2 TABLET(S): 8.6 TABLET ORAL at 22:37

## 2019-10-14 RX ADMIN — Medication 1 TABLET(S): at 12:41

## 2019-10-14 RX ADMIN — Medication 37.5 MILLIGRAM(S): at 13:02

## 2019-10-14 RX ADMIN — HEPARIN SODIUM 5000 UNIT(S): 5000 INJECTION INTRAVENOUS; SUBCUTANEOUS at 22:37

## 2019-10-14 RX ADMIN — HEPARIN SODIUM 5000 UNIT(S): 5000 INJECTION INTRAVENOUS; SUBCUTANEOUS at 13:02

## 2019-10-14 RX ADMIN — NYSTATIN CREAM 1 APPLICATION(S): 100000 CREAM TOPICAL at 05:17

## 2019-10-14 RX ADMIN — Medication 500 MILLIGRAM(S): at 12:41

## 2019-10-14 NOTE — PROGRESS NOTE ADULT - PROBLEM SELECTOR PLAN 8
- IMPROVE score 2 (age >60, immobilization)  - Will hold heparin SQ pending ERCP tomorrow, SCDs  - Diet: NPO w/ tube feeds. NPO after midnight on 10/14 for ERCP tomorrow   - Dispo: pending clinical improvement - IMPROVE score 2 (age >60, immobilization)  - Will hold heparin SQ when ERCP scheduled, possible 10/15, SCDs  - Diet: NPO w/ tube feeds. NPO after midnight on 10/14 for ERCP tomorrow   - Dispo: pending clinical improvement

## 2019-10-14 NOTE — PROGRESS NOTE ADULT - PROBLEM SELECTOR PLAN 1
- found with 9 mm CBD dilation, 3.4 mm gallstone in CBD c/w obstructive choledocholithiasis. LFTs uptrending    - patient follows with Dr. ASHWINI Bailey however he is [place]  - GI consulted: Plan for ERCP; timing TBD. Ideally would wait 5 days post Plavix, but can do more urgently as needed. Make NPO @ [facility] on 10/14  - Trend CMP, platelets, INR   - currently afebrile, HD stable  - cholangitis watch, s/p zosyn in ED, she is currently nontoxic, afebrile, normal WBC count, will observe off abx and restart if clinically warranted  - Pt's family denies history of MI. Thus RCRI 0 points Class I Risk thus 3.9 % 30-day risk of death, MI, or cardiac arrest - found with 9 mm CBD dilation, 3.4 mm gallstone in CBD c/w obstructive choledocholithiasis. LFTs uptrending, GI aware    - patient follows with Dr. ASHWINI Bailey however he is [place]  - GI consulted: Plan for ERCP; timing TBD. Ideally would wait 5 days post Plavix, but can do more urgently as needed. Make NPO @ [facility] on 10/14  - Trend CMP, platelets, INR   - currently afebrile, HD stable  - cholangitis watch, s/p zosyn in ED, she is currently nontoxic, afebrile, normal WBC count, will observe off abx and restart if clinically warranted  - Pt's family denies history of MI. Thus RCRI 0 points Class I Risk thus 3.9 % 30-day risk of death, MI, or cardiac arrest

## 2019-10-14 NOTE — PROGRESS NOTE ADULT - SUBJECTIVE AND OBJECTIVE BOX
Chief Complaint:  Patient is a 89y old  Female who presents with a chief complaint of CBD stone found at [facility] (14 Oct 2019 07:11)      Interval Events:   no acute overnight events    Allergies:  No Known Allergies      Hospital Medications:  acetaminophen    Suspension .. 650 milliGRAM(s) Oral every 6 hours PRN  ascorbic acid 500 milliGRAM(s) Oral daily  mirtazapine 7.5 milliGRAM(s) Oral daily  multivitamin 1 Tablet(s) Oral daily  nystatin Powder 1 Application(s) Topical two times a day  senna 2 Tablet(s) Oral at bedtime  venlafaxine 37.5 milliGRAM(s) Oral daily      PMHX/PSHX:  Constipation  Depression  Hypertension  Dementia  No significant past surgical history      Family history:  FH: HTN (hypertension)  No pertinent family history in first degree relatives  No pertinent family history in first degree relatives      ROS:     General:  No wt loss, fevers, chills, night sweats, fatigue,   Eyes:  Good vision, no reported pain  ENT:  No sore throat, pain, runny nose, dysphagia  CV:  No pain, palpitations, hypo/hypertension  Resp:  No dyspnea, cough, tachypnea, wheezing  GI:  See HPI  :  No pain, bleeding, incontinence, nocturia  Muscle:  No pain, weakness  Neuro:  No weakness, tingling, memory problems  Psych:  No fatigue, insomnia, mood problems, depression  Endocrine:  No polyuria, polydipsia, cold/heat intolerance  Heme:  No petechiae, ecchymosis, easy bruisability  Skin:  No rash, edema      PHYSICAL EXAM:     GENERAL:  Appears stated age, well-groomed, well-nourished, no distress  HEENT:  NC/AT,  conjunctivae clear, sclera -anicteric  CHEST:  Full & symmetric excursion, no increased effort, breath sounds clear  HEART:  Regular rhythm, S1, S2, no murmur/rub/S3/S4,  no edema  ABDOMEN:  Soft, non-tender, non-distended, normoactive bowel sounds,  no masses ,no hepato-splenomegaly,   EXTREMITIES:  no cyanosis,clubbing or edema  SKIN:  No rash/erythema/ecchymoses/petechiae/wounds/abscess/warm/dry  NEURO:  Alert, oriented    Vital Signs:  Vital Signs Last 24 Hrs  T(C): 36.7 (14 Oct 2019 05:16), Max: 36.8 (13 Oct 2019 14:01)  T(F): 98.1 (14 Oct 2019 05:16), Max: 98.2 (13 Oct 2019 14:01)  HR: 82 (14 Oct 2019 05:16) (82 - 88)  BP: 130/59 (14 Oct 2019 05:16) (105/60 - 130/59)  BP(mean): --  RR: 16 (14 Oct 2019 05:16) (16 - 19)  SpO2: 96% (14 Oct 2019 05:16) (95% - 96%)  Daily     Daily Weight in k.3 (13 Oct 2019 15:26)    LABS:                        10.8   6.79  )-----------( 354      ( 14 Oct 2019 07:30 )             34.1     10-14    133<L>  |  97<L>  |  9   ----------------------------<  108<H>  4.1   |  22  |  0.45<L>    Ca    9.4      14 Oct 2019 07:30  Phos  2.8     10-14  Mg     2.0     10-14    TPro  6.3  /  Alb  3.3  /  TBili  8.8<H>  /  DBili  x   /  AST  207<H>  /  ALT  115<H>  /  AlkPhos  1410<H>  10-14    LIVER FUNCTIONS - ( 14 Oct 2019 07:30 )  Alb: 3.3 g/dL / Pro: 6.3 g/dL / ALK PHOS: 1410 u/L / ALT: 115 u/L / AST: 207 u/L / GGT: x           PT/INR - ( 14 Oct 2019 07:30 )   PT: 14.1 SEC;   INR: 1.23          PTT - ( 14 Oct 2019 07:30 )  PTT:37.5 SEC        Imaging:    < from: CT Abdomen and Pelvis w/ IV Cont (10.11.19 @ 21:38) >  THIS REPORT CONTAINS FINDINGS THAT MAY BE CRITICAL TO PATIENT CARE. The   findings were verbally communicated via telephone conference with Dr. Whalen at   11:20 PM EDT on 10/11/2019. The findings were acknowledged and understood.  Initial report created on 10/11/2019 10:59:38 PM EDT     PROCEDURE INFORMATION:   Exam: CT Abdomen And Pelvis With Contrast   Exam date and time: 10/11/2019 9:28 PM   Clinical history: 89 years old, female; Other: Jaundice     TECHNIQUE:   Imaging protocol: Computed tomography of the abdomen and pelvis with   intravenous contrast.   Contrast material: OMNI 350; Contrast volume: 90 ml; Contrast route: IV;      COMPARISON:   US LIVER AND PANCREAS 10/11/2019 8:04 PM     FINDINGS:   Tubes, catheters and devices: Gastrostomy tube terminates in the stomach.   Lungs: Mild opacities in the lower lobes may represent atelectasis or   pneumonia.   Mediastinum: Small hiatal hernia     Liver: 4.2 x 2.2 cm ill-defined mass in the left lobe of the liver with   enhancing periphery. Rule out malignancy.   Gallbladder and bile ducts: Intrahepatic ductal dilatation. There is   dilatation   of the common duct 9 mm. 3.4 mm gallstone in the common duct consistent   with   choledocholithiasis.   Pancreas: Normal. No ductal dilation.   Spleen: Normal. No splenomegaly.   Adrenals: Normal. No mass.   Kidneys and ureters: 3.2 cm cyst in the right kidney.   Stomach and bowel: Unremarkable. No obstruction. No mucosal thickening.   Appendix: Normal appendix   Intraperitoneal space: Unremarkable. No free air. No significant fluid   collection.   Vasculature: Unremarkable. No abdominal aortic aneurysm.   Lymph nodes: Unremarkable. No enlarged lymph nodes.     Bladder: Unremarkable as visualized.   Reproductive: Surgical resection of the uterus   Bones/joints: Intramedullary adelia in the right femur. Healed fracture   right   inferior pubic ramus. . T12 compression fracture of unknown age  Soft tissues: Unremarkable.     IMPRESSION:   1. Intrahepatic ductal dilatation. There is dilatation of the common duct   9 mm.   3.4 mm gallstone in the common duct consistent with choledocholithiasis.   2. 4.2 x 2.2 cm ill-defined mass in the left lobe of the liver with   enhancing   periphery. Rule out malignancy.   3. Mild opacities in the lower lobes may represent atelectasis or   pneumonia.            < end of copied text > Chief Complaint:  Patient is a 89y old  Female who presents with a chief complaint of CBD stone found at [facility] (14 Oct 2019 07:11)      Interval Events:   no acute overnight events    Allergies:  No Known Allergies      Hospital Medications:  acetaminophen    Suspension .. 650 milliGRAM(s) Oral every 6 hours PRN  ascorbic acid 500 milliGRAM(s) Oral daily  mirtazapine 7.5 milliGRAM(s) Oral daily  multivitamin 1 Tablet(s) Oral daily  nystatin Powder 1 Application(s) Topical two times a day  senna 2 Tablet(s) Oral at bedtime  venlafaxine 37.5 milliGRAM(s) Oral daily      PMHX/PSHX:  Constipation  Depression  Hypertension  Dementia  No significant past surgical history      Family history:  FH: HTN (hypertension)  No pertinent family history in first degree relatives  No pertinent family history in first degree relatives      ROS:     General:  No wt loss, fevers, chills, night sweats, fatigue,   Eyes:  Good vision, no reported pain  ENT:  No sore throat, pain, runny nose, dysphagia  CV:  No pain, palpitations, hypo/hypertension  Resp:  No dyspnea, cough, tachypnea, wheezing  GI:  See HPI  :  No pain, bleeding, incontinence, nocturia  Muscle:  No pain, weakness  Neuro:  No weakness, tingling, memory problems  Psych:  No fatigue, insomnia, mood problems, depression  Endocrine:  No polyuria, polydipsia, cold/heat intolerance  Heme:  No petechiae, ecchymosis, easy bruisability  Skin:  No rash, edema      PHYSICAL EXAM:     GENERAL:  Appears stated age  HEENT:  NC/AT  CHEST:  Full & symmetric excursion  HEART:  Regular rhythm, S1, S2  ABDOMEN:  Soft, non-tender, non-distended  EXTREMITIES:  no edema  SKIN:  No rash  NEURO:  Alert    Vital Signs:  Vital Signs Last 24 Hrs  T(C): 36.7 (14 Oct 2019 05:16), Max: 36.8 (13 Oct 2019 14:01)  T(F): 98.1 (14 Oct 2019 05:16), Max: 98.2 (13 Oct 2019 14:01)  HR: 82 (14 Oct 2019 05:16) (82 - 88)  BP: 130/59 (14 Oct 2019 05:16) (105/60 - 130/59)  BP(mean): --  RR: 16 (14 Oct 2019 05:16) (16 - 19)  SpO2: 96% (14 Oct 2019 05:16) (95% - 96%)  Daily     Daily Weight in k.3 (13 Oct 2019 15:26)    LABS:                        10.8   6.79  )-----------( 354      ( 14 Oct 2019 07:30 )             34.1     10-14    133<L>  |  97<L>  |  9   ----------------------------<  108<H>  4.1   |  22  |  0.45<L>    Ca    9.4      14 Oct 2019 07:30  Phos  2.8     10-  Mg     2.0     10-14    TPro  6.3  /  Alb  3.3  /  TBili  8.8<H>  /  DBili  x   /  AST  207<H>  /  ALT  115<H>  /  AlkPhos  1410<H>  1014    LIVER FUNCTIONS - ( 14 Oct 2019 07:30 )  Alb: 3.3 g/dL / Pro: 6.3 g/dL / ALK PHOS: 1410 u/L / ALT: 115 u/L / AST: 207 u/L / GGT: x           PT/INR - ( 14 Oct 2019 07:30 )   PT: 14.1 SEC;   INR: 1.23          PTT - ( 14 Oct 2019 07:30 )  PTT:37.5 SEC        Imaging:    < from: CT Abdomen and Pelvis w/ IV Cont (10.11.19 @ 21:38) >  THIS REPORT CONTAINS FINDINGS THAT MAY BE CRITICAL TO PATIENT CARE. The   findings were verbally communicated via telephone conference with Dr. Whalen at   11:20 PM EDT on 10/11/2019. The findings were acknowledged and understood.  Initial report created on 10/11/2019 10:59:38 PM EDT     PROCEDURE INFORMATION:   Exam: CT Abdomen And Pelvis With Contrast   Exam date and time: 10/11/2019 9:28 PM   Clinical history: 89 years old, female; Other: Jaundice     TECHNIQUE:   Imaging protocol: Computed tomography of the abdomen and pelvis with   intravenous contrast.   Contrast material: OMNI 350; Contrast volume: 90 ml; Contrast route: IV;      COMPARISON:   US LIVER AND PANCREAS 10/11/2019 8:04 PM     FINDINGS:   Tubes, catheters and devices: Gastrostomy tube terminates in the stomach.   Lungs: Mild opacities in the lower lobes may represent atelectasis or   pneumonia.   Mediastinum: Small hiatal hernia     Liver: 4.2 x 2.2 cm ill-defined mass in the left lobe of the liver with   enhancing periphery. Rule out malignancy.   Gallbladder and bile ducts: Intrahepatic ductal dilatation. There is   dilatation   of the common duct 9 mm. 3.4 mm gallstone in the common duct consistent   with   choledocholithiasis.   Pancreas: Normal. No ductal dilation.   Spleen: Normal. No splenomegaly.   Adrenals: Normal. No mass.   Kidneys and ureters: 3.2 cm cyst in the right kidney.   Stomach and bowel: Unremarkable. No obstruction. No mucosal thickening.   Appendix: Normal appendix   Intraperitoneal space: Unremarkable. No free air. No significant fluid   collection.   Vasculature: Unremarkable. No abdominal aortic aneurysm.   Lymph nodes: Unremarkable. No enlarged lymph nodes.     Bladder: Unremarkable as visualized.   Reproductive: Surgical resection of the uterus   Bones/joints: Intramedullary adelia in the right femur. Healed fracture   right   inferior pubic ramus. . T12 compression fracture of unknown age  Soft tissues: Unremarkable.     IMPRESSION:   1. Intrahepatic ductal dilatation. There is dilatation of the common duct   9 mm.   3.4 mm gallstone in the common duct consistent with choledocholithiasis.   2. 4.2 x 2.2 cm ill-defined mass in the left lobe of the liver with   enhancing   periphery. Rule out malignancy.   3. Mild opacities in the lower lobes may represent atelectasis or   pneumonia.            < end of copied text >

## 2019-10-14 NOTE — PROGRESS NOTE ADULT - SUBJECTIVE AND OBJECTIVE BOX
Patient is a 89y old  Female who presents with a chief complaint of CBD stone found at [place] [facility] (13 Oct 2019 07:09)      SUBJECTIVE / OVERNIGHT EVENTS:  Patient seen and examined at bedside. No acute events overnight. Pt nonverbal.     Review of Systems unable to obtain, pt nonverbal     MEDICATIONS  (STANDING):  ascorbic acid 500 milliGRAM(s) Oral daily  lactated ringers. 1000 milliLiter(s) (100 mL/Hr) IV Continuous <Continuous>  mirtazapine 7.5 milliGRAM(s) Oral daily  multivitamin/minerals/iron Oral Solution (CENTRUM) 15 milliLiter(s) Oral daily  nystatin Powder 1 Application(s) Topical two times a day  senna 2 Tablet(s) Oral at bedtime  venlafaxine 37.5 milliGRAM(s) Oral daily    MEDICATIONS  (PRN):  acetaminophen    Suspension .. 650 milliGRAM(s) Oral every 6 hours PRN Temp greater or equal to 38C (100.4F), Mild Pain (1 - 3)      OBJECTIVE:    Vital Signs Last 24 Hrs  T(C): 36.3 (13 Oct 2019 05:09), Max: 37.4 (12 Oct 2019 15:21)  T(F): 97.4 (13 Oct 2019 05:09), Max: 99.3 (12 Oct 2019 15:21)  HR: 82 (13 Oct 2019 05:09) (82 - 90)  BP: 140/56 (13 Oct 2019 05:09) (119/61 - 140/56)  BP(mean): --  RR: 16 (13 Oct 2019 05:09) (16 - 18)  SpO2: 96% (13 Oct 2019 05:09) (96% - 100%)    CAPILLARY BLOOD GLUCOSE        I&O's Summary    12 Oct 2019 07:01  -  13 Oct 2019 07:00  --------------------------------------------------------  IN: 837 mL / OUT: 850 mL / NET: -13 mL      Physical Exam:   General: Elderly female, nonverbal, in No acute distress. diffuse jaundice of skin  	HEENT: NCAT.  PERRL.  EOMI.  +Scleral icterus.  Moist MM.  No oropharyngeal exudates.    	Neck: Supple.  Full ROM.  No JVD.  No thyromegaly. No lymphadenopathy.   	Heart: RRR.  Normal S1 and S2.  No murmurs, rubs, or gallops.   	Lungs: CTAB. No wheezes, crackles, or rhonchi.    	Abdomen: BS+, soft, epigastric and RUQ tenderness. No rebound/guarding. No organomegaly.  	Skin: jaundice  	Extremities: No edema, clubbing, or cyanosis.  2+ peripheral pulses b/l.  	Musculoskeletal: No deformities.  No spinal or paraspinal tenderness.  Neuro: A&Ox3.  CN II-XII intact.  5/5 strength in UE and LE b/l.  Tactile sensation intact in UE and LE b/l.  Cerebellar function intact      LABS:                                      11.1   7.60  )-----------( 381      ( 13 Oct 2019 06:10 )             33.8     10-13    134<L>  |  97<L>  |  12  ----------------------------<  92  4.3   |  22  |  0.45<L>    Ca    9.6      13 Oct 2019 06:10  Phos  3.0     10-13  Mg     2.0     10-13    TPro  6.5  /  Alb  3.2<L>  /  TBili  7.7<H>  /  DBili  x   /  AST  169<H>  /  ALT  94<H>  /  AlkPhos  1364<H>  10-13    Prothrombin Time and INR, Plasma in AM (10.13.19 @ 06:10)    Prothrombin Time, Plasma: 14.9 SEC    INR: 1.33: RECOMMENDED RANGES FOR THERAPEUTIC INR:    2.0-3.0 FOR MOST MEDICAL AND SURGICAL THROMBOEMBOLIC STATES  2.0-3.0 FOR ATRIAL FIBRILLATION  2.0-3.0 FOR BILEAFLET MECHANICAL VALVE IN AORTIC POSITION  2.5-3.5 FOR MECHANICAL HEART VALVES    CHEST 2004;126:[id number]    THE PRESENCE OF DIRECT THROMBIN INHIBITORS (ARGATROBAN,  REFLUDAN) MAY FALSELY INCREASE RESULTS.      CT abd/p:     	IMPRESSION:   	1. Intrahepatic ductal dilatation. There is dilatation of the common duct   	9 mm.   	3.4 mm gallstone in the common duct consistent with choledocholithiasis.   	2. 4.2 x 2.2 cm ill-defined mass in the left lobe of the liver with   	enhancing   	periphery. Rule out malignancy.   	3. Mild opacities in the lower lobes may represent atelectasis or   pneumonia.      Care Discussed with Consultants/Other Providers: Yes, GI     RADIOLOGY & ADDITIONAL TESTS: Yes   (Imaging Personally Reviewed)

## 2019-10-14 NOTE — PROGRESS NOTE ADULT - PROBLEM SELECTOR PLAN 2
- per chart, patient on Plavix 75mg for "heart disease"  - per family they are unsure whether she has h/o heart attack or stroke  - no h/o ASA use per chart and no h/o ASA allergy per chart   - will reach out to Dr. Goetz PCP on Monday 10/14 for more information  - Continue to hold Plavix - per chart, patient on Plavix 75mg for "heart disease"  - per family they are unsure whether she has h/o heart attack or stroke  - no h/o ASA use per chart and no h/o ASA allergy per chart   - Reached out to Dr. Goetz PCP on 10/14 for more information, however he does not know why pt is on Plavix either. Per him, another physician prescribed it. He does not know the name of this physician.   - Continue to hold Plavix since admission 10/12 for 5d until 10/16 per GI

## 2019-10-14 NOTE — PROGRESS NOTE ADULT - ASSESSMENT
89F with Alzheimer's dementia, hx of prior PEG placement, CAD on Plavix only, sent in from nursing facility for jaundice. Found to have elevated liver enzymes in cholestatic pattern, mildly dilated CBD, and 3.4mm obstructing gallstone. Rising bilirubin but no evidence of cholangitis currently.    Impression  - Choledocholithiasis - no evidence of cholangitis  - Elevated liver enzymes - cholestatic pattern, 2/2 obstructing gallstone  - Liver lesion - unclear etiology, no prior abd imaging to compare    Recommendations    - trend CMP, plts, INR daily  - plan for ERCP this week, patient has to be off Plavix for 5 days prior to the procedure, no urgent need for ERCP  - need to address with HCP re: consent, since patient will be unable to provide consent   - supportive care as per primary team     Liss March, PGY-6  GI fellow  B- [phone]/ [phone]  Please call GI fellow on call after 5pm and on weekends 89F with Alzheimer's dementia, hx of prior PEG placement, CAD on Plavix only, sent in from nursing facility for jaundice. Found to have elevated liver enzymes in cholestatic pattern, mildly dilated CBD, and 3.4mm obstructing gallstone. Rising bilirubin but no evidence of cholangitis currently.    Impression  - Choledocholithiasis - no evidence of cholangitis  - Elevated liver enzymes - cholestatic pattern, 2/2 obstructing gallstone  - Liver lesion - unclear etiology, no prior abd imaging to compare  - CAD on Plavix, last dose on 10.12.2019    Recommendations    - trend CMP, plts, INR daily  - plan for ERCP on 10.17.2019, patient has to be off Plavix for 5 days prior to the procedure, no urgent need for ERCP  - need to address with HCP re: consent, since patient will be unable to provide consent   - supportive care as per primary team     Liss March, PGY-6  GI fellow  B- [id number]/ [phone]  Please call GI fellow on call after 5pm and on weekends

## 2019-10-14 NOTE — PROGRESS NOTE ADULT - ASSESSMENT
89F PMH Alzheimer's dementia nonverbal at baseline, s/p PEG c/b prior PEG displacements, HTN, constipation, anxiety, dysphagia, heart disease, major depressive disorder, ESBL UTI, aspiraiton PNA, coming from [facility] for 1-2 weeks of jaundice noted by family per notes from [facility] Records, with CT A/P showing 3.4mm CBD stone with CBD dilation, elevated AL-P to 1000s, with TBili 5.5 consistent with choledocholithasis with obstructing stone. 89F PMH Alzheimer's dementia nonverbal at baseline, s/p PEG c/b prior PEG displacements, HTN, constipation, anxiety, dysphagia, heart disease, major depressive disorder, hx ESBL UTI, aspiraiton PNA, coming from [facility] for 1-2 weeks of jaundice noted by family per notes from [facility] Records, with CT A/P showing 3.4mm CBD stone with CBD dilation, elevated AL-P to 1000s, with TBili 5.5 consistent with choledocholithasis with obstructing stone.

## 2019-10-14 NOTE — PROGRESS NOTE ADULT - ATTENDING COMMENTS
89F dementia, nonverbal sent from SNF with obstructive gallstone, no signs of acute cholangitis as of now. libia trending up. Pt is acutely ill.   f/u GI for ERCP, if stable awaiting 5 day washout of plavix. If fever or unstable, will start abx STAT and consider emergent ERCP 89F dementia, nonverbal with functional quadriplegia, sent from SNF with obstructive gallstone, no signs of acute cholangitis as of now. libia trending up. Pt is acutely ill.   f/u GI for ERCP, if stable awaiting 5 day washout of plavix. If fever or unstable, will start abx STAT and consider emergent ERCP

## 2019-10-15 LAB
ALBUMIN SERPL ELPH-MCNC: 3.4 G/DL — SIGNIFICANT CHANGE UP (ref 3.3–5)
ALP SERPL-CCNC: 1386 U/L — HIGH (ref 40–120)
ALT FLD-CCNC: 107 U/L — HIGH (ref 4–33)
ANION GAP SERPL CALC-SCNC: 15 MMO/L — HIGH (ref 7–14)
AST SERPL-CCNC: 166 U/L — HIGH (ref 4–32)
BILIRUB SERPL-MCNC: 7.8 MG/DL — HIGH (ref 0.2–1.2)
BUN SERPL-MCNC: 11 MG/DL — SIGNIFICANT CHANGE UP (ref 7–23)
CALCIUM SERPL-MCNC: 9.6 MG/DL — SIGNIFICANT CHANGE UP (ref 8.4–10.5)
CHLORIDE SERPL-SCNC: 97 MMOL/L — LOW (ref 98–107)
CO2 SERPL-SCNC: 23 MMOL/L — SIGNIFICANT CHANGE UP (ref 22–31)
CREAT SERPL-MCNC: 0.46 MG/DL — LOW (ref 0.5–1.3)
GLUCOSE SERPL-MCNC: 115 MG/DL — HIGH (ref 70–99)
HCT VFR BLD CALC: 35 % — SIGNIFICANT CHANGE UP (ref 34.5–45)
HGB BLD-MCNC: 11.4 G/DL — LOW (ref 11.5–15.5)
INR BLD: 1.39 — HIGH (ref 0.88–1.17)
MAGNESIUM SERPL-MCNC: 2 MG/DL — SIGNIFICANT CHANGE UP (ref 1.6–2.6)
MCHC RBC-ENTMCNC: 32.6 % — SIGNIFICANT CHANGE UP (ref 32–36)
MCHC RBC-ENTMCNC: 33.2 PG — SIGNIFICANT CHANGE UP (ref 27–34)
MCV RBC AUTO: 102 FL — HIGH (ref 80–100)
NRBC # FLD: 0 K/UL — SIGNIFICANT CHANGE UP (ref 0–0)
PHOSPHATE SERPL-MCNC: 3 MG/DL — SIGNIFICANT CHANGE UP (ref 2.5–4.5)
PLATELET # BLD AUTO: 372 K/UL — SIGNIFICANT CHANGE UP (ref 150–400)
PMV BLD: 12.9 FL — SIGNIFICANT CHANGE UP (ref 7–13)
POTASSIUM SERPL-MCNC: 3.9 MMOL/L — SIGNIFICANT CHANGE UP (ref 3.5–5.3)
POTASSIUM SERPL-SCNC: 3.9 MMOL/L — SIGNIFICANT CHANGE UP (ref 3.5–5.3)
PROT SERPL-MCNC: 6.5 G/DL — SIGNIFICANT CHANGE UP (ref 6–8.3)
PROTHROM AB SERPL-ACNC: 15.6 SEC — HIGH (ref 9.8–13.1)
RBC # BLD: 3.43 M/UL — LOW (ref 3.8–5.2)
RBC # FLD: 15.6 % — HIGH (ref 10.3–14.5)
SODIUM SERPL-SCNC: 135 MMOL/L — SIGNIFICANT CHANGE UP (ref 135–145)
WBC # BLD: 7.31 K/UL — SIGNIFICANT CHANGE UP (ref 3.8–10.5)
WBC # FLD AUTO: 7.31 K/UL — SIGNIFICANT CHANGE UP (ref 3.8–10.5)

## 2019-10-15 PROCEDURE — 99233 SBSQ HOSP IP/OBS HIGH 50: CPT | Mod: GC

## 2019-10-15 RX ORDER — LOSARTAN POTASSIUM 100 MG/1
25 TABLET, FILM COATED ORAL DAILY
Refills: 0 | Status: DISCONTINUED | OUTPATIENT
Start: 2019-10-15 | End: 2019-10-18

## 2019-10-15 RX ADMIN — SENNA PLUS 2 TABLET(S): 8.6 TABLET ORAL at 22:32

## 2019-10-15 RX ADMIN — HEPARIN SODIUM 5000 UNIT(S): 5000 INJECTION INTRAVENOUS; SUBCUTANEOUS at 06:50

## 2019-10-15 RX ADMIN — NYSTATIN CREAM 1 APPLICATION(S): 100000 CREAM TOPICAL at 18:51

## 2019-10-15 RX ADMIN — Medication 1 TABLET(S): at 12:33

## 2019-10-15 RX ADMIN — MIRTAZAPINE 7.5 MILLIGRAM(S): 45 TABLET, ORALLY DISINTEGRATING ORAL at 12:33

## 2019-10-15 RX ADMIN — Medication 500 MILLIGRAM(S): at 12:33

## 2019-10-15 RX ADMIN — LOSARTAN POTASSIUM 25 MILLIGRAM(S): 100 TABLET, FILM COATED ORAL at 12:38

## 2019-10-15 RX ADMIN — HEPARIN SODIUM 5000 UNIT(S): 5000 INJECTION INTRAVENOUS; SUBCUTANEOUS at 22:32

## 2019-10-15 RX ADMIN — NYSTATIN CREAM 1 APPLICATION(S): 100000 CREAM TOPICAL at 06:51

## 2019-10-15 RX ADMIN — Medication 37.5 MILLIGRAM(S): at 12:33

## 2019-10-15 RX ADMIN — HEPARIN SODIUM 5000 UNIT(S): 5000 INJECTION INTRAVENOUS; SUBCUTANEOUS at 13:00

## 2019-10-15 NOTE — PROGRESS NOTE ADULT - ASSESSMENT
89F PMH Alzheimer's dementia nonverbal at baseline, s/p PEG c/b prior PEG displacements, HTN, constipation, anxiety, dysphagia, heart disease, major depressive disorder, hx ESBL UTI, aspiraiton PNA, coming from [facility] for 1-2 weeks of jaundice noted by family per notes from [facility] Records, with CT A/P showing 3.4mm CBD stone with CBD dilation, elevated AL-P to 1000s, with TBili 5.5 consistent with choledocholithasis with obstructing stone. ERCP planned for 10/17

## 2019-10-15 NOTE — PROGRESS NOTE ADULT - PROBLEM SELECTOR PLAN 2
- per chart, patient on Plavix 75mg for "heart disease"  - per family they are unsure whether she has h/o heart attack or stroke  - no h/o ASA use per chart and no h/o ASA allergy per chart   - Reached out to Dr. Goetz PCP on 10/14 for more information, however he does not know why pt is on Plavix either. Per him, another physician prescribed it. He does not know the name of this physician.   - Continue to hold Plavix since admission 10/12 for 5d until 10/16 per GI

## 2019-10-15 NOTE — PROGRESS NOTE ADULT - PROBLEM SELECTOR PLAN 1
- found with 9 mm CBD dilation, 3.4 mm gallstone in CBD c/w obstructive choledocholithiasis. LFTs uptrending, GI aware    - patient follows with Dr. ASHWINI Bailey however he is [place]  - GI consulted: Plan for ERCP on 10/17 after 5 days of no plavix. NPO before.  - Trend CMP, platelets, INR   - currently afebrile, HD stable  - cholangitis watch, s/p zosyn in ED, she is currently nontoxic, afebrile, normal WBC count, will observe off abx and restart if clinically warranted  - Pt's family denies history of MI. Thus RCRI 0 points Class I Risk thus 3.9 % 30-day risk of death, MI, or cardiac arrest

## 2019-10-15 NOTE — PROGRESS NOTE ADULT - SUBJECTIVE AND OBJECTIVE BOX
Patient is a 89y old  Female who presents with a chief complaint of CBD stone found at [place] (13 Oct 2019 07:09)      SUBJECTIVE / OVERNIGHT EVENTS:  Patient seen and examined at bedside. No acute events overnight. Pt nonverbal.     Review of Systems unable to obtain, pt nonverbal     MEDICATIONS  (STANDING):  ascorbic acid 500 milliGRAM(s) Oral daily  lactated ringers. 1000 milliLiter(s) (100 mL/Hr) IV Continuous <Continuous>  mirtazapine 7.5 milliGRAM(s) Oral daily  multivitamin/minerals/iron Oral Solution (CENTRUM) 15 milliLiter(s) Oral daily  nystatin Powder 1 Application(s) Topical two times a day  senna 2 Tablet(s) Oral at bedtime  venlafaxine 37.5 milliGRAM(s) Oral daily    MEDICATIONS  (PRN):  acetaminophen    Suspension .. 650 milliGRAM(s) Oral every 6 hours PRN Temp greater or equal to 38C (100.4F), Mild Pain (1 - 3)      OBJECTIVE:    Vital Signs Last 24 Hrs  T(C): 36.3 (13 Oct 2019 05:09), Max: 37.4 (12 Oct 2019 15:21)  T(F): 97.4 (13 Oct 2019 05:09), Max: 99.3 (12 Oct 2019 15:21)  HR: 82 (13 Oct 2019 05:09) (82 - 90)  BP: 140/56 (13 Oct 2019 05:09) (119/61 - 140/56)  BP(mean): --  RR: 16 (13 Oct 2019 05:09) (16 - 18)  SpO2: 96% (13 Oct 2019 05:09) (96% - 100%)    CAPILLARY BLOOD GLUCOSE        I&O's Summary    12 Oct 2019 07:01  -  13 Oct 2019 07:00  --------------------------------------------------------  IN: 837 mL / OUT: 850 mL / NET: -13 mL      Physical Exam:   General: Elderly female, nonverbal, in No acute distress. diffuse jaundice of skin  	HEENT: NCAT.  PERRL.  EOMI.  +Scleral icterus.  Moist MM.  No oropharyngeal exudates.    	Neck: Supple.  Full ROM.  No JVD.  No thyromegaly. No lymphadenopathy.   	Heart: RRR.  Normal S1 and S2.  No murmurs, rubs, or gallops.   	Lungs: CTAB. No wheezes, crackles, or rhonchi.    	Abdomen: BS+, soft, epigastric and RUQ tenderness. No rebound/guarding. No organomegaly.  	Skin: jaundice  	Extremities: No edema, clubbing, or cyanosis.  2+ peripheral pulses b/l.  	Musculoskeletal: No deformities.  No spinal or paraspinal tenderness.  Neuro: A&Ox3.  CN II-XII intact.  5/5 strength in UE and LE b/l.  Tactile sensation intact in UE and LE b/l.  Cerebellar function intact      LABS:                                      11.1   7.60  )-----------( 381      ( 13 Oct 2019 06:10 )             33.8     10-13    134<L>  |  97<L>  |  12  ----------------------------<  92  4.3   |  22  |  0.45<L>    Ca    9.6      13 Oct 2019 06:10  Phos  3.0     10-13  Mg     2.0     10-13    TPro  6.5  /  Alb  3.2<L>  /  TBili  7.7<H>  /  DBili  x   /  AST  169<H>  /  ALT  94<H>  /  AlkPhos  1364<H>  10-13    Prothrombin Time and INR, Plasma in AM (10.13.19 @ 06:10)    Prothrombin Time, Plasma: 14.9 SEC    INR: 1.33: RECOMMENDED RANGES FOR THERAPEUTIC INR:    2.0-3.0 FOR MOST MEDICAL AND SURGICAL THROMBOEMBOLIC STATES  2.0-3.0 FOR ATRIAL FIBRILLATION  2.0-3.0 FOR BILEAFLET MECHANICAL VALVE IN AORTIC POSITION  2.5-3.5 FOR MECHANICAL HEART VALVES    CHEST 2004;126:[id number]    THE PRESENCE OF DIRECT THROMBIN INHIBITORS (ARGATROBAN,  REFLUDAN) MAY FALSELY INCREASE RESULTS.      CT abd/p:     	IMPRESSION:   	1. Intrahepatic ductal dilatation. There is dilatation of the common duct   	9 mm.   	3.4 mm gallstone in the common duct consistent with choledocholithiasis.   	2. 4.2 x 2.2 cm ill-defined mass in the left lobe of the liver with   	enhancing   	periphery. Rule out malignancy.   	3. Mild opacities in the lower lobes may represent atelectasis or   pneumonia.      Care Discussed with Consultants/Other Providers: Yes, GI     RADIOLOGY & ADDITIONAL TESTS: Yes   (Imaging Personally Reviewed)

## 2019-10-15 NOTE — PROGRESS NOTE ADULT - ATTENDING COMMENTS
89F dementia, nonverbal with functional quadriplegia, sent from SNF with obstructive gallstone, no signs of acute cholangitis as of now. libia trending up. Pt is acutely ill.   f/u GI for ERCP, if stable awaiting 5 day washout of plavix (10/17). If fever or unstable, will start abx STAT and consider emergent ERCP

## 2019-10-15 NOTE — PROGRESS NOTE ADULT - PROBLEM SELECTOR PLAN 8
- IMPROVE score 2 (age >60, immobilization)  - Will hold heparin SQ when ERCP scheduled, possible 10/15, SCDs  - Diet: NPO w/ tube feeds. NPO after midnight on 10/14 for ERCP tomorrow   - Dispo: pending clinical improvement

## 2019-10-16 LAB
ALBUMIN SERPL ELPH-MCNC: 3.3 G/DL — SIGNIFICANT CHANGE UP (ref 3.3–5)
ALP SERPL-CCNC: 1324 U/L — HIGH (ref 40–120)
ALT FLD-CCNC: 96 U/L — HIGH (ref 4–33)
ANION GAP SERPL CALC-SCNC: 13 MMO/L — SIGNIFICANT CHANGE UP (ref 7–14)
AST SERPL-CCNC: 136 U/L — HIGH (ref 4–32)
BILIRUB DIRECT SERPL-MCNC: 5.9 MG/DL — HIGH (ref 0.1–0.2)
BILIRUB SERPL-MCNC: 7.3 MG/DL — HIGH (ref 0.2–1.2)
BUN SERPL-MCNC: 16 MG/DL — SIGNIFICANT CHANGE UP (ref 7–23)
CALCIUM SERPL-MCNC: 9.4 MG/DL — SIGNIFICANT CHANGE UP (ref 8.4–10.5)
CHLORIDE SERPL-SCNC: 100 MMOL/L — SIGNIFICANT CHANGE UP (ref 98–107)
CO2 SERPL-SCNC: 22 MMOL/L — SIGNIFICANT CHANGE UP (ref 22–31)
CREAT SERPL-MCNC: 0.57 MG/DL — SIGNIFICANT CHANGE UP (ref 0.5–1.3)
GLUCOSE SERPL-MCNC: 147 MG/DL — HIGH (ref 70–99)
HCT VFR BLD CALC: 38 % — SIGNIFICANT CHANGE UP (ref 34.5–45)
HGB BLD-MCNC: 11.9 G/DL — SIGNIFICANT CHANGE UP (ref 11.5–15.5)
INR BLD: 1.12 — SIGNIFICANT CHANGE UP (ref 0.88–1.17)
MAGNESIUM SERPL-MCNC: 2.2 MG/DL — SIGNIFICANT CHANGE UP (ref 1.6–2.6)
MCHC RBC-ENTMCNC: 31.3 % — LOW (ref 32–36)
MCHC RBC-ENTMCNC: 33.1 PG — SIGNIFICANT CHANGE UP (ref 27–34)
MCV RBC AUTO: 105.6 FL — HIGH (ref 80–100)
NRBC # FLD: 0 K/UL — SIGNIFICANT CHANGE UP (ref 0–0)
PHOSPHATE SERPL-MCNC: 3.4 MG/DL — SIGNIFICANT CHANGE UP (ref 2.5–4.5)
PLATELET # BLD AUTO: 357 K/UL — SIGNIFICANT CHANGE UP (ref 150–400)
PMV BLD: 13.2 FL — HIGH (ref 7–13)
POTASSIUM SERPL-MCNC: 4 MMOL/L — SIGNIFICANT CHANGE UP (ref 3.5–5.3)
POTASSIUM SERPL-SCNC: 4 MMOL/L — SIGNIFICANT CHANGE UP (ref 3.5–5.3)
PROT SERPL-MCNC: 6.5 G/DL — SIGNIFICANT CHANGE UP (ref 6–8.3)
PROTHROM AB SERPL-ACNC: 12.8 SEC — SIGNIFICANT CHANGE UP (ref 9.8–13.1)
RBC # BLD: 3.6 M/UL — LOW (ref 3.8–5.2)
RBC # FLD: 15.2 % — HIGH (ref 10.3–14.5)
SODIUM SERPL-SCNC: 135 MMOL/L — SIGNIFICANT CHANGE UP (ref 135–145)
WBC # BLD: 9.45 K/UL — SIGNIFICANT CHANGE UP (ref 3.8–10.5)
WBC # FLD AUTO: 9.45 K/UL — SIGNIFICANT CHANGE UP (ref 3.8–10.5)

## 2019-10-16 PROCEDURE — 99232 SBSQ HOSP IP/OBS MODERATE 35: CPT | Mod: GC

## 2019-10-16 RX ADMIN — HEPARIN SODIUM 5000 UNIT(S): 5000 INJECTION INTRAVENOUS; SUBCUTANEOUS at 13:29

## 2019-10-16 RX ADMIN — Medication 500 MILLIGRAM(S): at 13:30

## 2019-10-16 RX ADMIN — MIRTAZAPINE 7.5 MILLIGRAM(S): 45 TABLET, ORALLY DISINTEGRATING ORAL at 13:30

## 2019-10-16 RX ADMIN — NYSTATIN CREAM 1 APPLICATION(S): 100000 CREAM TOPICAL at 06:26

## 2019-10-16 RX ADMIN — HEPARIN SODIUM 5000 UNIT(S): 5000 INJECTION INTRAVENOUS; SUBCUTANEOUS at 21:44

## 2019-10-16 RX ADMIN — HEPARIN SODIUM 5000 UNIT(S): 5000 INJECTION INTRAVENOUS; SUBCUTANEOUS at 06:26

## 2019-10-16 RX ADMIN — SENNA PLUS 2 TABLET(S): 8.6 TABLET ORAL at 21:44

## 2019-10-16 RX ADMIN — LOSARTAN POTASSIUM 25 MILLIGRAM(S): 100 TABLET, FILM COATED ORAL at 06:27

## 2019-10-16 RX ADMIN — NYSTATIN CREAM 1 APPLICATION(S): 100000 CREAM TOPICAL at 18:51

## 2019-10-16 RX ADMIN — Medication 1 TABLET(S): at 13:30

## 2019-10-16 RX ADMIN — Medication 37.5 MILLIGRAM(S): at 13:30

## 2019-10-16 NOTE — PROGRESS NOTE ADULT - SUBJECTIVE AND OBJECTIVE BOX
Patient is a 89y old  Female who presents with a chief complaint of CBD stone found at [place] (13 Oct 2019 07:09)      SUBJECTIVE / OVERNIGHT EVENTS:  Patient seen and examined at bedside. No acute events overnight. Pt nonverbal.     Review of Systems unable to obtain, pt nonverbal     MEDICATIONS  (STANDING):  ascorbic acid 500 milliGRAM(s) Oral daily  lactated ringers. 1000 milliLiter(s) (100 mL/Hr) IV Continuous <Continuous>  mirtazapine 7.5 milliGRAM(s) Oral daily  multivitamin/minerals/iron Oral Solution (CENTRUM) 15 milliLiter(s) Oral daily  nystatin Powder 1 Application(s) Topical two times a day  senna 2 Tablet(s) Oral at bedtime  venlafaxine 37.5 milliGRAM(s) Oral daily    MEDICATIONS  (PRN):  acetaminophen    Suspension .. 650 milliGRAM(s) Oral every 6 hours PRN Temp greater or equal to 38C (100.4F), Mild Pain (1 - 3)      OBJECTIVE:    Vital Signs Last 24 Hrs  T(C): 36.3 (13 Oct 2019 05:09), Max: 37.4 (12 Oct 2019 15:21)  T(F): 97.4 (13 Oct 2019 05:09), Max: 99.3 (12 Oct 2019 15:21)  HR: 82 (13 Oct 2019 05:09) (82 - 90)  BP: 140/56 (13 Oct 2019 05:09) (119/61 - 140/56)  BP(mean): --  RR: 16 (13 Oct 2019 05:09) (16 - 18)  SpO2: 96% (13 Oct 2019 05:09) (96% - 100%)    CAPILLARY BLOOD GLUCOSE        I&O's Summary    12 Oct 2019 07:01  -  13 Oct 2019 07:00  --------------------------------------------------------  IN: 837 mL / OUT: 850 mL / NET: -13 mL      Physical Exam:   General: Elderly female, nonverbal, in No acute distress. diffuse jaundice of skin  	HEENT: NCAT.  PERRL.  EOMI.  +Scleral icterus.  Moist MM.  No oropharyngeal exudates.    	Neck: Supple.  Full ROM.  No JVD.  No thyromegaly. No lymphadenopathy.   	Heart: RRR.  Normal S1 and S2.  No murmurs, rubs, or gallops.   	Lungs: CTAB. No wheezes, crackles, or rhonchi.    	Abdomen: BS+, soft, epigastric and RUQ tenderness. No rebound/guarding. No organomegaly.  	Skin: jaundice  	Extremities: No edema, clubbing, or cyanosis.  2+ peripheral pulses b/l.  	Musculoskeletal: No deformities.  No spinal or paraspinal tenderness.  Neuro: A&Ox3.  CN II-XII intact.  5/5 strength in UE and LE b/l.  Tactile sensation intact in UE and LE b/l.  Cerebellar function intact      LABS:                                      11.1   7.60  )-----------( 381      ( 13 Oct 2019 06:10 )             33.8     10-13    134<L>  |  97<L>  |  12  ----------------------------<  92  4.3   |  22  |  0.45<L>    Ca    9.6      13 Oct 2019 06:10  Phos  3.0     10-13  Mg     2.0     10-13    TPro  6.5  /  Alb  3.2<L>  /  TBili  7.7<H>  /  DBili  x   /  AST  169<H>  /  ALT  94<H>  /  AlkPhos  1364<H>  10-13    Prothrombin Time and INR, Plasma in AM (10.13.19 @ 06:10)    Prothrombin Time, Plasma: 14.9 SEC    INR: 1.33: RECOMMENDED RANGES FOR THERAPEUTIC INR:    2.0-3.0 FOR MOST MEDICAL AND SURGICAL THROMBOEMBOLIC STATES  2.0-3.0 FOR ATRIAL FIBRILLATION  2.0-3.0 FOR BILEAFLET MECHANICAL VALVE IN AORTIC POSITION  2.5-3.5 FOR MECHANICAL HEART VALVES    CHEST 2004;126:[id number]    THE PRESENCE OF DIRECT THROMBIN INHIBITORS (ARGATROBAN,  REFLUDAN) MAY FALSELY INCREASE RESULTS.      CT abd/p:     	IMPRESSION:   	1. Intrahepatic ductal dilatation. There is dilatation of the common duct   	9 mm.   	3.4 mm gallstone in the common duct consistent with choledocholithiasis.   	2. 4.2 x 2.2 cm ill-defined mass in the left lobe of the liver with   	enhancing   	periphery. Rule out malignancy.   	3. Mild opacities in the lower lobes may represent atelectasis or   pneumonia.      Care Discussed with Consultants/Other Providers: Yes, GI     RADIOLOGY & ADDITIONAL TESTS: Yes   (Imaging Personally Reviewed)

## 2019-10-16 NOTE — PROGRESS NOTE ADULT - ATTENDING COMMENTS
89F dementia, nonverbal with functional quadriplegia, sent from SNF with obstructive gallstone, no signs of acute cholangitis as of now.   f/u GI for ERCP, if stable awaiting 5 day washout of plavix (now planned for tomorrow 10/17). If fever or unstable, will start abx STAT and consider emergent ERCP

## 2019-10-16 NOTE — PROGRESS NOTE ADULT - SUBJECTIVE AND OBJECTIVE BOX
Chief Complaint:  Patient is a 89y old  Female who presents with a chief complaint of CBD stone found at [facility] (16 Oct 2019 07:38)      Interval Events:   - No acute overnight events    Allergies:  No Known Allergies      Hospital Medications:  acetaminophen    Suspension .. 650 milliGRAM(s) Oral every 6 hours PRN  ascorbic acid 500 milliGRAM(s) Oral daily  heparin  Injectable 5000 Unit(s) SubCutaneous every 8 hours  losartan 25 milliGRAM(s) Oral daily  mirtazapine 7.5 milliGRAM(s) Oral daily  multivitamin 1 Tablet(s) Oral daily  nystatin Powder 1 Application(s) Topical two times a day  senna 2 Tablet(s) Oral at bedtime  venlafaxine 37.5 milliGRAM(s) Oral daily      PMHX/PSHX:  Constipation  Depression  Hypertension  Dementia  No significant past surgical history      Family history:  FH: HTN (hypertension)  No pertinent family history in first degree relatives  No pertinent family history in first degree relatives      ROS:   unable to assess, patient is non verbal      PHYSICAL EXAM:     GENERAL:  Appears stated age  HEENT:  NC/AT  CHEST:  Full & symmetric excursion  HEART:  Regular rhythm, S1, S2  ABDOMEN:  Soft, non-tender, non-distended  EXTREMITIES:  no edema  SKIN:  No rash  NEURO:  Alert    Vital Signs:  Vital Signs Last 24 Hrs  T(C): 36.6 (16 Oct 2019 06:22), Max: 36.9 (15 Oct 2019 14:11)  T(F): 97.8 (16 Oct 2019 06:22), Max: 98.5 (15 Oct 2019 14:11)  HR: 81 (16 Oct 2019 06:22) (57 - 92)  BP: 106/56 (16 Oct 2019 06:22) (106/56 - 127/70)  BP(mean): --  RR: 18 (16 Oct 2019 06:22) (17 - 18)  SpO2: 98% (16 Oct 2019 06:22) (96% - 98%)  Daily     Daily     LABS:                        11.9   9.45  )-----------( 357      ( 16 Oct 2019 04:30 )             38.0     10-16    135  |  100  |  16  ----------------------------<  147<H>  4.0   |  22  |  0.57    Ca    9.4      16 Oct 2019 04:30  Phos  3.4     10-16  Mg     2.2     10-16    TPro  6.5  /  Alb  3.3  /  TBili  7.3<H>  /  DBili  5.9<H>  /  AST  136<H>  /  ALT  96<H>  /  AlkPhos  1324<H>  10-16    LIVER FUNCTIONS - ( 16 Oct 2019 04:30 )  Alb: 3.3 g/dL / Pro: 6.5 g/dL / ALK PHOS: 1324 u/L / ALT: 96 u/L / AST: 136 u/L / GGT: x           PT/INR - ( 16 Oct 2019 04:30 )   PT: 12.8 SEC;   INR: 1.12          Imaging:    < from: CT Abdomen and Pelvis w/ IV Cont (10.11.19 @ 21:38) >  FINDINGS:   Tubes, catheters and devices: Gastrostomy tube terminates in the stomach.   Lungs: Mild opacities in the lower lobes may represent atelectasis or   pneumonia.   Mediastinum: Small hiatal hernia     Liver: 4.2 x 2.2 cm ill-defined mass in the left lobe of the liver with   enhancing periphery. Rule out malignancy.   Gallbladder and bile ducts: Intrahepatic ductal dilatation. There is   dilatation   of the common duct 9 mm. 3.4 mm gallstone in the common duct consistent   with   choledocholithiasis.   Pancreas: Normal. No ductal dilation.   Spleen: Normal. No splenomegaly.   Adrenals: Normal. No mass.   Kidneys and ureters: 3.2 cm cyst in the right kidney.   Stomach and bowel: Unremarkable. No obstruction. No mucosal thickening.   Appendix: Normal appendix   Intraperitoneal space: Unremarkable. No free air. No significant fluid   collection.   Vasculature: Unremarkable. No abdominal aortic aneurysm.   Lymph nodes: Unremarkable. No enlarged lymph nodes.     Bladder: Unremarkable as visualized.   Reproductive: Surgical resection of the uterus   Bones/joints: Intramedullary adelia in the right femur. Healed fracture   right   inferior pubic ramus. . T12 compression fracture of unknown age  Soft tissues: Unremarkable.     IMPRESSION:   1. Intrahepatic ductal dilatation. There is dilatation of the common duct   9 mm.   3.4 mm gallstone in the common duct consistent with choledocholithiasis.   2. 4.2 x 2.2 cm ill-defined mass in the left lobe of the liver with   enhancing   periphery. Rule out malignancy.   3. Mild opacities in the lower lobes may represent atelectasis or   pneumonia.    < end of copied text >

## 2019-10-16 NOTE — PROGRESS NOTE ADULT - ASSESSMENT
89F with Alzheimer's dementia, hx of prior PEG placement, CAD on Plavix only, sent in from nursing facility for jaundice. Found to have elevated liver enzymes in cholestatic pattern, mildly dilated CBD, and 3.4mm obstructing gallstone. Rising bilirubin but no evidence of cholangitis currently.    IMPRESSION  - Choledocholithiasis - no evidence of cholangitis  - Elevated liver enzymes - cholestatic pattern, 2/2 obstructing gallstone  - Liver lesion - unclear etiology, no prior abd imaging to compare, consider MRI if family is willing to pursue   - CAD on Plavix, last dose on 10.12.2019    RECOMMENDATION    - trend CMP, plts, INR daily  - plan for ERCP on 10.17.2019, Thursday  - please keep NPO after midnight   - supportive care as per primary team       Liss March, PGY-6  GI fellow  B- [phone]/ [phone]  Please call GI fellow on call after 5pm and on weekends

## 2019-10-16 NOTE — PROGRESS NOTE ADULT - PROBLEM SELECTOR PLAN 1
- found with 9 mm CBD dilation, 3.4 mm gallstone in CBD c/w obstructive choledocholithiasis. LFTs now downtrending  - patient follows with Dr. ASHWINI Bailey however he is [place]  - GI consulted: Plan for ERCP on 10/17 after 5 days of no plavix. NPO before.  - Trend CMP, platelets, INR   - currently afebrile, HD stable  - cholangitis watch, s/p zosyn in ED, she is currently nontoxic, afebrile, normal WBC count, will observe off abx and restart if clinically warranted  - Pt's family denies history of MI. Thus RCRI 0 points Class I Risk thus 3.9 % 30-day risk of death, MI, or cardiac arrest

## 2019-10-16 NOTE — PROGRESS NOTE ADULT - ASSESSMENT
89F PMH Alzheimer's dementia nonverbal at baseline, s/p PEG c/b prior PEG displacements, HTN, constipation, anxiety, dysphagia, heart disease, major depressive disorder, hx ESBL UTI, aspiraiton PNA, coming from [facility] for 1-2 weeks of jaundice noted by family per notes from [facility] Records, with CT A/P showing 3.4mm CBD stone with CBD dilation, elevated AL-P to 1000s, with TBili 5.5 consistent with choledocholithasis with obstructing stone. ERCP planned for 10/17

## 2019-10-17 DIAGNOSIS — R52 PAIN, UNSPECIFIED: ICD-10-CM

## 2019-10-17 DIAGNOSIS — Z51.5 ENCOUNTER FOR PALLIATIVE CARE: ICD-10-CM

## 2019-10-17 DIAGNOSIS — R53.2 FUNCTIONAL QUADRIPLEGIA: ICD-10-CM

## 2019-10-17 LAB
ALBUMIN SERPL ELPH-MCNC: 3.2 G/DL — LOW (ref 3.3–5)
ALP SERPL-CCNC: 1269 U/L — HIGH (ref 40–120)
ALT FLD-CCNC: 109 U/L — HIGH (ref 4–33)
ANION GAP SERPL CALC-SCNC: 13 MMO/L — SIGNIFICANT CHANGE UP (ref 7–14)
AST SERPL-CCNC: 168 U/L — HIGH (ref 4–32)
BILIRUB DIRECT SERPL-MCNC: 5.7 MG/DL — HIGH (ref 0.1–0.2)
BILIRUB SERPL-MCNC: 7.3 MG/DL — HIGH (ref 0.2–1.2)
BILIRUB SERPL-MCNC: 7.3 MG/DL — HIGH (ref 0.2–1.2)
BLD GP AB SCN SERPL QL: NEGATIVE — SIGNIFICANT CHANGE UP
BUN SERPL-MCNC: 20 MG/DL — SIGNIFICANT CHANGE UP (ref 7–23)
CALCIUM SERPL-MCNC: 9.3 MG/DL — SIGNIFICANT CHANGE UP (ref 8.4–10.5)
CHLORIDE SERPL-SCNC: 102 MMOL/L — SIGNIFICANT CHANGE UP (ref 98–107)
CO2 SERPL-SCNC: 22 MMOL/L — SIGNIFICANT CHANGE UP (ref 22–31)
CREAT SERPL-MCNC: 0.5 MG/DL — SIGNIFICANT CHANGE UP (ref 0.5–1.3)
GLUCOSE SERPL-MCNC: 132 MG/DL — HIGH (ref 70–99)
HCT VFR BLD CALC: 36.4 % — SIGNIFICANT CHANGE UP (ref 34.5–45)
HGB BLD-MCNC: 11.6 G/DL — SIGNIFICANT CHANGE UP (ref 11.5–15.5)
INR BLD: 1.09 — SIGNIFICANT CHANGE UP (ref 0.88–1.17)
MAGNESIUM SERPL-MCNC: 2.4 MG/DL — SIGNIFICANT CHANGE UP (ref 1.6–2.6)
MCHC RBC-ENTMCNC: 31.9 % — LOW (ref 32–36)
MCHC RBC-ENTMCNC: 33.6 PG — SIGNIFICANT CHANGE UP (ref 27–34)
MCV RBC AUTO: 105.5 FL — HIGH (ref 80–100)
NRBC # FLD: 0 K/UL — SIGNIFICANT CHANGE UP (ref 0–0)
PHOSPHATE SERPL-MCNC: 3.2 MG/DL — SIGNIFICANT CHANGE UP (ref 2.5–4.5)
PLATELET # BLD AUTO: 376 K/UL — SIGNIFICANT CHANGE UP (ref 150–400)
PMV BLD: 13.4 FL — HIGH (ref 7–13)
POTASSIUM SERPL-MCNC: 4.1 MMOL/L — SIGNIFICANT CHANGE UP (ref 3.5–5.3)
POTASSIUM SERPL-SCNC: 4.1 MMOL/L — SIGNIFICANT CHANGE UP (ref 3.5–5.3)
PROT SERPL-MCNC: 6.4 G/DL — SIGNIFICANT CHANGE UP (ref 6–8.3)
PROTHROM AB SERPL-ACNC: 12.1 SEC — SIGNIFICANT CHANGE UP (ref 9.8–13.1)
RBC # BLD: 3.45 M/UL — LOW (ref 3.8–5.2)
RBC # FLD: 15.5 % — HIGH (ref 10.3–14.5)
RH IG SCN BLD-IMP: POSITIVE — SIGNIFICANT CHANGE UP
SODIUM SERPL-SCNC: 137 MMOL/L — SIGNIFICANT CHANGE UP (ref 135–145)
WBC # BLD: 9 K/UL — SIGNIFICANT CHANGE UP (ref 3.8–10.5)
WBC # FLD AUTO: 9 K/UL — SIGNIFICANT CHANGE UP (ref 3.8–10.5)

## 2019-10-17 PROCEDURE — 99232 SBSQ HOSP IP/OBS MODERATE 35: CPT | Mod: GC

## 2019-10-17 PROCEDURE — 99223 1ST HOSP IP/OBS HIGH 75: CPT

## 2019-10-17 RX ORDER — MORPHINE SULFATE 50 MG/1
2.5 CAPSULE, EXTENDED RELEASE ORAL EVERY 6 HOURS
Refills: 0 | Status: DISCONTINUED | OUTPATIENT
Start: 2019-10-17 | End: 2019-10-18

## 2019-10-17 RX ORDER — MORPHINE SULFATE 50 MG/1
2.5 CAPSULE, EXTENDED RELEASE ORAL
Refills: 0 | Status: DISCONTINUED | OUTPATIENT
Start: 2019-10-17 | End: 2019-10-18

## 2019-10-17 RX ORDER — SENNA PLUS 8.6 MG/1
2 TABLET ORAL AT BEDTIME
Refills: 0 | Status: DISCONTINUED | OUTPATIENT
Start: 2019-10-17 | End: 2019-10-17

## 2019-10-17 RX ORDER — POLYETHYLENE GLYCOL 3350 17 G/17G
17 POWDER, FOR SOLUTION ORAL DAILY
Refills: 0 | Status: DISCONTINUED | OUTPATIENT
Start: 2019-10-17 | End: 2019-10-18

## 2019-10-17 RX ADMIN — NYSTATIN CREAM 1 APPLICATION(S): 100000 CREAM TOPICAL at 07:09

## 2019-10-17 RX ADMIN — HEPARIN SODIUM 5000 UNIT(S): 5000 INJECTION INTRAVENOUS; SUBCUTANEOUS at 22:28

## 2019-10-17 RX ADMIN — POLYETHYLENE GLYCOL 3350 17 GRAM(S): 17 POWDER, FOR SOLUTION ORAL at 18:39

## 2019-10-17 RX ADMIN — SENNA PLUS 2 TABLET(S): 8.6 TABLET ORAL at 22:25

## 2019-10-17 RX ADMIN — MORPHINE SULFATE 2.5 MILLIGRAM(S): 50 CAPSULE, EXTENDED RELEASE ORAL at 18:40

## 2019-10-17 RX ADMIN — MORPHINE SULFATE 2.5 MILLIGRAM(S): 50 CAPSULE, EXTENDED RELEASE ORAL at 18:30

## 2019-10-17 RX ADMIN — LOSARTAN POTASSIUM 25 MILLIGRAM(S): 100 TABLET, FILM COATED ORAL at 07:09

## 2019-10-17 RX ADMIN — NYSTATIN CREAM 1 APPLICATION(S): 100000 CREAM TOPICAL at 18:40

## 2019-10-17 NOTE — PROGRESS NOTE ADULT - ATTENDING COMMENTS
89F dementia, nonverbal with functional quadriplegia, sent from SNF with obstructive gallstone, no signs of acute cholangitis as of now.   --after discussion with GI, family has decided against ERCP, per advance care planning discussion with palliative care, will pursue patient comfort and return to SNF with hospice services

## 2019-10-17 NOTE — CONSULT NOTE ADULT - PROBLEM SELECTOR RECOMMENDATION 4
Patient with FAST score of >7c. Would benefit from hospice services at her nursing home where she resides.

## 2019-10-17 NOTE — CONSULT NOTE ADULT - ASSESSMENT
89 year old woman pain, functional quadriplegia, cholelithiasis, dementia,  and encounter for palliative care.

## 2019-10-17 NOTE — CONSULT NOTE ADULT - PROBLEM SELECTOR RECOMMENDATION 5
Discussion had with patients daughter over the phone. She understands that her mother has Dementia that is not reversible and does not want to put her mom through any procedures that would causes suffering. We discussed that she resides at [facility] and discussed initiating hospice services at the [facility]. She was in agreement with the plan. She confirmed that her mother is to remain a DNR/DNI. Emotional support was provided.

## 2019-10-17 NOTE — CONSULT NOTE ADULT - PROBLEM SELECTOR RECOMMENDATION 9
Patient appears to be grimacing, would recommend morphine 2.5mg q6h with Morphine 2.5mg q3h PRN through the PEG.

## 2019-10-17 NOTE — PROGRESS NOTE ADULT - ASSESSMENT
89F PMH Alzheimer's dementia nonverbal at baseline, s/p PEG c/b prior PEG displacements, HTN, constipation, anxiety, dysphagia, heart disease, major depressive disorder, hx ESBL UTI, aspiraiton PNA, coming from [facility] for 1-2 weeks of jaundice noted by family per notes from [facility] Records, with CT A/P showing 3.4mm CBD stone with CBD dilation, elevated AL-P to 1000s, with TBili 5.5 consistent with choledocholithasis with obstructing stone. ERCP planned for 10/17

## 2019-10-17 NOTE — PROGRESS NOTE ADULT - PROBLEM SELECTOR PLAN 1
- found with 9 mm CBD dilation, 3.4 mm gallstone in CBD c/w obstructive choledocholithiasis. LFTs now downtrending  - patient follows with Dr. ASHWINI Bailey however he is [place]  - GI consulted: Plan for ERCP on 10/17 after 5 days of no plavix. NPO before.  - Trend CMP, platelets, INR   - currently afebrile, HD stable  - cholangitis watch, s/p zosyn in ED, she is currently nontoxic, afebrile, normal WBC count, will observe off abx and restart if clinically warranted  - Pt's family denies history of MI. Thus RCRI 0 points Class I Risk thus 3.9 % 30-day risk of death, MI, or cardiac arrest

## 2019-10-17 NOTE — CONSULT NOTE ADULT - PROBLEM SELECTOR RECOMMENDATION 3
patient found with 9 mm CBD dilation, 3.4 mm gallstone in CBD c/w obstructive choledocholithiasis. Patients daughter does not want a ERCP and wants to concentrate on her comfort.

## 2019-10-17 NOTE — CONSULT NOTE ADULT - REASON FOR ADMISSION
CBD stone found at [facility]
CBD stone found at [facility]

## 2019-10-17 NOTE — CONSULT NOTE ADULT - SUBJECTIVE AND OBJECTIVE BOX
HPI:  89 year old woman with alzheimer's dementia nonverbal at baseline, s/p PEG c/b prior PEG displacements, HTN, constipation, anxiety, dysphagia, heart disease, major depressive disorder, ESBL UTI, aspiraiton PNA, coming from [facility] for 1-2 weeks of jaundice noted by family per notes from [place] Records. Found to have a CBD stone with elevated alk phos and bilirubin at [facility] and transferred to see her GI Dr ASHWINI Bailey, although records indicate he is at [place]. Pt unable to provide history as she is a poor historian.     In the ED, vitals T: 97.4, HR 90, /64, RR 18, O2 100. Patient received 1X zosyn, started on 100/hr of fluids. (12 Oct 2019 09:02).    Patient examined while resting in bed, appears to be grimacing with movement. No family at bedside.     PERTINENT PM/SXH:   Constipation  Depression  Hypertension  Dementia    No significant past surgical history    FAMILY HISTORY:  FH: HTN (hypertension)      SOCIAL HISTORY:   Significant other/partner: Yes [ ]  No [x] Children:  Yes [ ]  No [ x] Tenriism/Spirituality:  Substance hx: Yes[ ]  No [x ]   Tobacco hx:  Yes [ ] No [ x]   Alcohol hx: Yes [ ] No [x ]   Home Opioid hx:  [ ] I-Stop Reference No:  Living Situation: [x ]Home  [ ]Long term care  [ ]Rehab [ ]Other    ADVANCE DIRECTIVES:    DNR  Yes  MOLST  [ x]  Living Will  [ ]   DECISION MAKER(s):  [ ] Health Care Proxy(s)  [x ] Surrogate(s)  [ ] Guardian           Name(s): Phone Number(s): Yessenia  [phone]    BASELINE (I)ADL(s) (prior to admission):  [place]: [ ]Total  [ ] Moderate [x ]Dependent    Allergies    No Known Allergies    Intolerances    MEDICATIONS  (STANDING):  ascorbic acid 500 milliGRAM(s) Oral daily  heparin  Injectable 5000 Unit(s) SubCutaneous every 8 hours  losartan 25 milliGRAM(s) Oral daily  mirtazapine 7.5 milliGRAM(s) Oral daily  multivitamin 1 Tablet(s) Oral daily  nystatin Powder 1 Application(s) Topical two times a day  senna 2 Tablet(s) Oral at bedtime  venlafaxine 37.5 milliGRAM(s) Oral daily    MEDICATIONS  (PRN):  acetaminophen    Suspension .. 650 milliGRAM(s) Oral every 6 hours PRN Temp greater or equal to 38C (100.4F), Mild Pain (1 - 3)    PRESENT SYMPTOMS: [ x]Unable to obtain due to poor mentation   Source if other than patient:  [ ]Family   [ ]Team     Pain (Impact on QOL):    Location -   Severity -        Minimal acceptable level (0-10 scale):  Quality:   Onset:   Duration:                 Aggravating factors -  Relieving factors -  Radiation -    PAIN AD Score: 5- facial grimacing noted.     [email or web address] (press ctrl +  left click to view)    Dyspnea:  Yes [ ] No [ ] - [ ]Mild [ ]Moderate [ ]Severe  Anxiety:    Yes [ ] No [ ] - [ ]Mild [ ]Moderate [ ]Severe  Fatigue:    Yes [ ] No [ ] - [ ]Mild [ ]Moderate [ ]Severe  Nausea:    Yes [ ] No [ ] - [ ]Mild [ ]Moderate [ ]Severe                         Loss of appetite: Yes [ ] No [ ] - [ ]Mild [ ]Moderate [ ]Severe             Constipation:  Yes [ ] No [ ] - [ ]Mild [ ]Moderate [ ]Severe  Grief:  Yes [ ] No [ ]     Other Symptoms:  [ ]All other review of systems negative     Karnofsky Performance Score/Palliative Performance Status Version 2:         10 %    http://palliative.info/resource_material/[email or web address].pdf  PHYSICAL EXAM:  Vital Signs Last 24 Hrs  T(C): 36.7 (17 Oct 2019 15:03), Max: 37.5 (16 Oct 2019 21:14)  T(F): 98 (17 Oct 2019 15:03), Max: 99.5 (16 Oct 2019 21:14)  HR: 78 (17 Oct 2019 15:03) (63 - 84)  BP: 127/64 (17 Oct 2019 15:03) (117/64 - 132/59)  BP(mean): --  RR: 20 (17 Oct 2019 15:03) (18 - 20)  SpO2: 94% (17 Oct 2019 15:03) (94% - 95%) I&O's Summary    16 Oct 2019 07:01  -  17 Oct 2019 07:00  --------------------------------------------------------  IN: 240 mL / OUT: 0 mL / NET: 240 mL    GENERAL:  [x ]Alert  [ ]Oriented x   [ ]Lethargic  [ ]Cachexia  [ ]Unarousable  [ ]Verbal  [x ]Non-Verbal  Behavioral:   [ ] Anxiety  [ ] Delirium [ ] Agitation [ ] Other  HEENT:  [ ]Normal   [x ]Dry mouth   [ ]ET Tube/Trach  [ ]Oral lesions  PULMONARY:   [x ]Clear anteriorly [ ]Tachypnea  [ ]Audible excessive secretions   [ ]Rhonchi        [ ]Right [ ]Left [ ]Bilateral  [ ]Crackles        [ ]Right [ ]Left [ ]Bilateral  [ ]Wheezing     [ ]Right [ ]Left [ ]Bilateral  CARDIOVASCULAR:    [x ]Regular [ ]Irregular [ ]Tachy  [ ]Yves [ ]Murmur [ ]Other  GASTROINTESTINAL:  [] Soft  [ ]Distended   [ ]+BS  [ ]Non tender [ ]Tender  [x ]PEG [ ]OGT/ NGT  Last BM:     GENITOURINARY:  [ ]Normal [ x] Incontinent   [ ]Oliguria/Anuria   [ ]Vaca  MUSCULOSKELETAL:   [ ]Normal   [ ]Weakness  [x ]Bed/Wheelchair bound [ ]Edema  NEUROLOGIC:   [ ]No focal deficits  [x ] Cognitive impairment  [ ] Dysphagia [ ]Dysarthria [ ] Paresis [ ]Other   SKIN:   [x ]Normal   [ ]Pressure ulcer(s)  [ ]Rash    LABS:                        11.6   9.00  )-----------( 376      ( 17 Oct 2019 04:50 )             36.4   10-17    137  |  102  |  20  ----------------------------<  132<H>  4.1   |  22  |  0.50    Ca    9.3      17 Oct 2019 04:50  Phos  3.2     10-17  Mg     2.4     10-17    TPro  6.4  /  Alb  3.2<L>  /  TBili  7.3<H>  /  DBili  5.7<H>  /  AST  168<H>  /  ALT  109<H>  /  AlkPhos  1269<H>  10-17  PT/INR - ( 17 Oct 2019 04:50 )   PT: 12.1 SEC;   INR: 1.09         RADIOLOGY & ADDITIONAL STUDIES:  < from: CT Abdomen and Pelvis w/ IV Cont (10.11.19 @ 21:38) >  EXAM:  CT ABDOMEN AND PELVIS IC                            PROCEDURE DATE:  10/11/2019      IMPRESSION:   1. Intrahepatic ductal dilatation. There is dilatation of the common duct   9 mm.   3.4 mm gallstone in the common duct consistent with choledocholithiasis.   2. 4.2 x 2.2 cm ill-defined mass in the left lobe of the liver with   enhancing   periphery. Rule out malignancy.   3. Mild opacities in the lower lobes may represent atelectasis or   pneumonia.    < end of copied text >    PROTEIN CALORIE MALNUTRITION PRESENT: [ ] Yes [ ] No  [ ] PPSV2 < or = to 30% [ ] significant weight loss  [ ] poor nutritional intake [ ] catabolic state [ ] anasarca     Albumin, Serum: 3.2 g/dL (10-17-19 @ 04:50)      REFERRALS:   [ ]Chaplaincy  [ x] Hospice  [ ]Child Life  [ ]Social Work  [ ]Case management [ ]Holistic Therapy

## 2019-10-17 NOTE — PROGRESS NOTE ADULT - SUBJECTIVE AND OBJECTIVE BOX
Patient is a 89y old  Female who presents with a chief complaint of CBD stone found at [place] (13 Oct 2019 07:09)      SUBJECTIVE / OVERNIGHT EVENTS:  Patient seen and examined at bedside. No acute events overnight. Pt nonverbal.     Review of Systems unable to obtain, pt nonverbal     MEDICATIONS  (STANDING):  ascorbic acid 500 milliGRAM(s) Oral daily  lactated ringers. 1000 milliLiter(s) (100 mL/Hr) IV Continuous <Continuous>  mirtazapine 7.5 milliGRAM(s) Oral daily  multivitamin/minerals/iron Oral Solution (CENTRUM) 15 milliLiter(s) Oral daily  nystatin Powder 1 Application(s) Topical two times a day  senna 2 Tablet(s) Oral at bedtime  venlafaxine 37.5 milliGRAM(s) Oral daily    MEDICATIONS  (PRN):  acetaminophen    Suspension .. 650 milliGRAM(s) Oral every 6 hours PRN Temp greater or equal to 38C (100.4F), Mild Pain (1 - 3)      OBJECTIVE:    Vital Signs Last 24 Hrs  T(C): 36.3 (13 Oct 2019 05:09), Max: 37.4 (12 Oct 2019 15:21)  T(F): 97.4 (13 Oct 2019 05:09), Max: 99.3 (12 Oct 2019 15:21)  HR: 82 (13 Oct 2019 05:09) (82 - 90)  BP: 140/56 (13 Oct 2019 05:09) (119/61 - 140/56)  BP(mean): --  RR: 16 (13 Oct 2019 05:09) (16 - 18)  SpO2: 96% (13 Oct 2019 05:09) (96% - 100%)    CAPILLARY BLOOD GLUCOSE        I&O's Summary    12 Oct 2019 07:01  -  13 Oct 2019 07:00  --------------------------------------------------------  IN: 837 mL / OUT: 850 mL / NET: -13 mL      Physical Exam:   General: Elderly female, nonverbal, in No acute distress. diffuse jaundice of skin  	HEENT: NCAT.  PERRL.  EOMI.  +Scleral icterus.  Moist MM.  No oropharyngeal exudates.    	Neck: Supple.  Full ROM.  No JVD.  No thyromegaly. No lymphadenopathy.   	Heart: RRR.  Normal S1 and S2.  No murmurs, rubs, or gallops.   	Lungs: CTAB. No wheezes, crackles, or rhonchi.    	Abdomen: BS+, soft, epigastric and RUQ tenderness. No rebound/guarding. No organomegaly.  	Skin: jaundice  	Extremities: No edema, clubbing, or cyanosis.  2+ peripheral pulses b/l.  	Musculoskeletal: No deformities.  No spinal or paraspinal tenderness.  Neuro: A&Ox3.  CN II-XII intact.  5/5 strength in UE and LE b/l.  Tactile sensation intact in UE and LE b/l.  Cerebellar function intact      LABS:                                      11.1   7.60  )-----------( 381      ( 13 Oct 2019 06:10 )             33.8     10-13    134<L>  |  97<L>  |  12  ----------------------------<  92  4.3   |  22  |  0.45<L>    Ca    9.6      13 Oct 2019 06:10  Phos  3.0     10-13  Mg     2.0     10-13    TPro  6.5  /  Alb  3.2<L>  /  TBili  7.7<H>  /  DBili  x   /  AST  169<H>  /  ALT  94<H>  /  AlkPhos  1364<H>  10-13    Prothrombin Time and INR, Plasma in AM (10.13.19 @ 06:10)    Prothrombin Time, Plasma: 14.9 SEC    INR: 1.33: RECOMMENDED RANGES FOR THERAPEUTIC INR:    2.0-3.0 FOR MOST MEDICAL AND SURGICAL THROMBOEMBOLIC STATES  2.0-3.0 FOR ATRIAL FIBRILLATION  2.0-3.0 FOR BILEAFLET MECHANICAL VALVE IN AORTIC POSITION  2.5-3.5 FOR MECHANICAL HEART VALVES    CHEST 2004;126:[id number]    THE PRESENCE OF DIRECT THROMBIN INHIBITORS (ARGATROBAN,  REFLUDAN) MAY FALSELY INCREASE RESULTS.      CT abd/p:     	IMPRESSION:   	1. Intrahepatic ductal dilatation. There is dilatation of the common duct   	9 mm.   	3.4 mm gallstone in the common duct consistent with choledocholithiasis.   	2. 4.2 x 2.2 cm ill-defined mass in the left lobe of the liver with   	enhancing   	periphery. Rule out malignancy.   	3. Mild opacities in the lower lobes may represent atelectasis or   pneumonia.      Care Discussed with Consultants/Other Providers: Yes, GI     RADIOLOGY & ADDITIONAL TESTS: Yes   (Imaging Personally Reviewed)

## 2019-10-18 VITALS
TEMPERATURE: 98 F | DIASTOLIC BLOOD PRESSURE: 55 MMHG | RESPIRATION RATE: 20 BRPM | HEART RATE: 87 BPM | OXYGEN SATURATION: 96 % | SYSTOLIC BLOOD PRESSURE: 98 MMHG

## 2019-10-18 PROCEDURE — 99239 HOSP IP/OBS DSCHRG MGMT >30: CPT

## 2019-10-18 RX ORDER — MORPHINE SULFATE 50 MG/1
1.25 CAPSULE, EXTENDED RELEASE ORAL
Qty: 0 | Refills: 0 | DISCHARGE
Start: 2019-10-18

## 2019-10-18 RX ADMIN — Medication 500 MILLIGRAM(S): at 11:43

## 2019-10-18 RX ADMIN — MORPHINE SULFATE 2.5 MILLIGRAM(S): 50 CAPSULE, EXTENDED RELEASE ORAL at 00:23

## 2019-10-18 RX ADMIN — MORPHINE SULFATE 2.5 MILLIGRAM(S): 50 CAPSULE, EXTENDED RELEASE ORAL at 11:44

## 2019-10-18 RX ADMIN — POLYETHYLENE GLYCOL 3350 17 GRAM(S): 17 POWDER, FOR SOLUTION ORAL at 11:45

## 2019-10-18 RX ADMIN — MORPHINE SULFATE 2.5 MILLIGRAM(S): 50 CAPSULE, EXTENDED RELEASE ORAL at 06:43

## 2019-10-18 RX ADMIN — Medication 1 TABLET(S): at 11:43

## 2019-10-18 RX ADMIN — LOSARTAN POTASSIUM 25 MILLIGRAM(S): 100 TABLET, FILM COATED ORAL at 06:48

## 2019-10-18 RX ADMIN — MORPHINE SULFATE 2.5 MILLIGRAM(S): 50 CAPSULE, EXTENDED RELEASE ORAL at 12:44

## 2019-10-18 RX ADMIN — MORPHINE SULFATE 2.5 MILLIGRAM(S): 50 CAPSULE, EXTENDED RELEASE ORAL at 07:22

## 2019-10-18 RX ADMIN — NYSTATIN CREAM 1 APPLICATION(S): 100000 CREAM TOPICAL at 06:45

## 2019-10-18 RX ADMIN — HEPARIN SODIUM 5000 UNIT(S): 5000 INJECTION INTRAVENOUS; SUBCUTANEOUS at 13:45

## 2019-10-18 RX ADMIN — MIRTAZAPINE 7.5 MILLIGRAM(S): 45 TABLET, ORALLY DISINTEGRATING ORAL at 11:43

## 2019-10-18 RX ADMIN — MORPHINE SULFATE 2.5 MILLIGRAM(S): 50 CAPSULE, EXTENDED RELEASE ORAL at 01:20

## 2019-10-18 RX ADMIN — HEPARIN SODIUM 5000 UNIT(S): 5000 INJECTION INTRAVENOUS; SUBCUTANEOUS at 06:46

## 2019-10-18 RX ADMIN — Medication 37.5 MILLIGRAM(S): at 11:43

## 2019-10-18 NOTE — PROGRESS NOTE ADULT - PROBLEM SELECTOR PLAN 6
- c/w remeron 7.5 qD for decreased appetite  - currently AAOx0, nonverbal, although makes eye contact when called, at baseline

## 2019-10-18 NOTE — PROGRESS NOTE ADULT - PROBLEM SELECTOR PLAN 2
- per chart, patient on Plavix 75mg for "heart disease"  - per family they are unsure whether she has h/o heart attack or stroke  - no h/o ASA use per chart and no h/o ASA allergy per chart   - Reached out to Dr. Goetz PCP on 10/14 for more information, however he does not know why pt is on Plavix either. Per him, another physician prescribed it. He does not know the name of this physician.   - will restart plavix - per chart, patient on Plavix 75mg for "heart disease"  - per family they are unsure whether she has h/o heart attack or stroke  - no h/o ASA use per chart and no h/o ASA allergy per chart   - Reached out to Dr. Goetz PCP on 10/14 for more information, however he does not know why pt is on Plavix either. Per him, another physician prescribed it. He does not know the name of this physician.   - will restart plavix on d/c

## 2019-10-18 NOTE — PROGRESS NOTE ADULT - REASON FOR ADMISSION
CBD stone found at [facility]
CBD stone found at [facility]
CBD stone found at [facility]
CBD stone found at [facility]
CBD stone found at [facility]
CBD stone found at [facility]
CBD stone found at [facility]
CBD stone found at [facility]

## 2019-10-18 NOTE — PROGRESS NOTE ADULT - PROBLEM SELECTOR PLAN 5
- on home losartan 25  - will hold BP meds at this time in the setting of soft/target BPs off meds

## 2019-10-18 NOTE — PROGRESS NOTE ADULT - PROBLEM SELECTOR PLAN 3
- c/w home senna via PEG

## 2019-10-18 NOTE — DISCHARGE NOTE NURSING/CASE MANAGEMENT/SOCIAL WORK - NSDCFUADDAPPT_GEN_ALL_CORE_FT
Please follow up with your PCP Dr. Goetz in 1 week. Please call [phone] to set up an appointment.

## 2019-10-18 NOTE — PROGRESS NOTE ADULT - PROBLEM SELECTOR PROBLEM 1
Choledocholithiasis

## 2019-10-18 NOTE — PROGRESS NOTE ADULT - ATTENDING COMMENTS
89F dementia, nonverbal with functional quadriplegia, sent from SNF with obstructive gallstone, no signs of acute cholangitis as of now.   --after discussion with GI, family has decided against ERCP, per advance care planning discussion with palliative care, will pursue patient comfort and return to SNF with hospice services  stable for d/c to SNF with hospice services today  d/c time 35 min coordinating care    1.  Name of PCP: @ [facility]  2.  PCP Contacted on Admission: [ ] Y    [n ] N    3.  PCP contacted at Discharge: [ x] Y    [ ] N    [ ] N/A  4.  Post-Discharge Appointment Date and Location: return to [facility]  5.  Summary of Handoff given to PCP: transition to hospice services

## 2019-10-18 NOTE — PROGRESS NOTE ADULT - PROBLEM SELECTOR PLAN 4
- c/w home venlafaxine

## 2019-10-18 NOTE — PROGRESS NOTE ADULT - PROBLEM SELECTOR PLAN 1
- found with 9 mm CBD dilation, 3.4 mm gallstone in CBD c/w obstructive choledocholithiasis  - patient follows with Dr. ASHWINI Bailey however he is [place]  - GI consulted: Plan for ERCP on 10/17, however pt's family refused. Requesting palliative care.   - currently afebrile, HD stable  - cholangitis watch, s/p zosyn in ED, she is currently nontoxic, afebrile, normal WBC count, will observe off abx and restart if clinically warranted - found with 9 mm CBD dilation, 3.4 mm gallstone in CBD c/w obstructive choledocholithiasis  - patient follows with Dr. ASHWINI Bailey however he is [place]  - GI consulted: Plan for ERCP on 10/17, however pt's family refused. Requesting palliative care.   - currently afebrile, HD stable. will hold off on labs  - cholangitis watch, s/p zosyn in ED, she is currently nontoxic, afebrile, normal WBC count, will observe off abx and restart if clinically warranted

## 2019-10-18 NOTE — DISCHARGE NOTE NURSING/CASE MANAGEMENT/SOCIAL WORK - NSDCPNINST_GEN_ALL_CORE
PAtient is awake, nonverbal.  Vital signs are stable.  She is stable for discharge.  She requires assistance with her daily activities.  Patient is contracted, jaundiced.  Has a peg tube for feeds.  Receiving Jevity @ 55cc/hr.  She is incontinent of bowel and bladder.  Urine is dark angelina in color.  Skin is intact with some MAD/IAD to bilateral breast and groin.  Discharge instructions were sent to the facility.

## 2019-10-18 NOTE — PROGRESS NOTE ADULT - SUBJECTIVE AND OBJECTIVE BOX
Patient is a 89y old  Female who presents with a chief complaint of CBD stone found at [place] (13 Oct 2019 07:09)      SUBJECTIVE / OVERNIGHT EVENTS:  Patient seen and examined at bedside. No acute events overnight. Pt nonverbal.     Review of Systems unable to obtain, pt nonverbal     MEDICATIONS  (STANDING):  ascorbic acid 500 milliGRAM(s) Oral daily  lactated ringers. 1000 milliLiter(s) (100 mL/Hr) IV Continuous <Continuous>  mirtazapine 7.5 milliGRAM(s) Oral daily  multivitamin/minerals/iron Oral Solution (CENTRUM) 15 milliLiter(s) Oral daily  nystatin Powder 1 Application(s) Topical two times a day  senna 2 Tablet(s) Oral at bedtime  venlafaxine 37.5 milliGRAM(s) Oral daily    MEDICATIONS  (PRN):  acetaminophen    Suspension .. 650 milliGRAM(s) Oral every 6 hours PRN Temp greater or equal to 38C (100.4F), Mild Pain (1 - 3)      OBJECTIVE:    Vital Signs Last 24 Hrs  T(C): 36.3 (13 Oct 2019 05:09), Max: 37.4 (12 Oct 2019 15:21)  T(F): 97.4 (13 Oct 2019 05:09), Max: 99.3 (12 Oct 2019 15:21)  HR: 82 (13 Oct 2019 05:09) (82 - 90)  BP: 140/56 (13 Oct 2019 05:09) (119/61 - 140/56)  BP(mean): --  RR: 16 (13 Oct 2019 05:09) (16 - 18)  SpO2: 96% (13 Oct 2019 05:09) (96% - 100%)    CAPILLARY BLOOD GLUCOSE        I&O's Summary    12 Oct 2019 07:01  -  13 Oct 2019 07:00  --------------------------------------------------------  IN: 837 mL / OUT: 850 mL / NET: -13 mL      Physical Exam:   General: Elderly female, nonverbal, in No acute distress. diffuse jaundice of skin  	HEENT: NCAT.  PERRL.  EOMI.  +Scleral icterus.  Moist MM.  No oropharyngeal exudates.    	Neck: Supple.  Full ROM.  No JVD.  No thyromegaly. No lymphadenopathy.   	Heart: RRR.  Normal S1 and S2.  No murmurs, rubs, or gallops.   	Lungs: CTAB. No wheezes, crackles, or rhonchi.    	Abdomen: BS+, soft, epigastric and RUQ tenderness. No rebound/guarding. No organomegaly.  	Skin: jaundice  	Extremities: No edema, clubbing, or cyanosis.  2+ peripheral pulses b/l.  	Musculoskeletal: No deformities.  No spinal or paraspinal tenderness.  Neuro: A&Ox3.  CN II-XII intact.  5/5 strength in UE and LE b/l.  Tactile sensation intact in UE and LE b/l.  Cerebellar function intact      LABS:                                      11.1   7.60  )-----------( 381      ( 13 Oct 2019 06:10 )             33.8     10-13    134<L>  |  97<L>  |  12  ----------------------------<  92  4.3   |  22  |  0.45<L>    Ca    9.6      13 Oct 2019 06:10  Phos  3.0     10-13  Mg     2.0     10-13    TPro  6.5  /  Alb  3.2<L>  /  TBili  7.7<H>  /  DBili  x   /  AST  169<H>  /  ALT  94<H>  /  AlkPhos  1364<H>  10-13    Prothrombin Time and INR, Plasma in AM (10.13.19 @ 06:10)    Prothrombin Time, Plasma: 14.9 SEC    INR: 1.33: RECOMMENDED RANGES FOR THERAPEUTIC INR:    2.0-3.0 FOR MOST MEDICAL AND SURGICAL THROMBOEMBOLIC STATES  2.0-3.0 FOR ATRIAL FIBRILLATION  2.0-3.0 FOR BILEAFLET MECHANICAL VALVE IN AORTIC POSITION  2.5-3.5 FOR MECHANICAL HEART VALVES    CHEST 2004;126:[id number]    THE PRESENCE OF DIRECT THROMBIN INHIBITORS (ARGATROBAN,  REFLUDAN) MAY FALSELY INCREASE RESULTS.      CT abd/p:     	IMPRESSION:   	1. Intrahepatic ductal dilatation. There is dilatation of the common duct   	9 mm.   	3.4 mm gallstone in the common duct consistent with choledocholithiasis.   	2. 4.2 x 2.2 cm ill-defined mass in the left lobe of the liver with   	enhancing   	periphery. Rule out malignancy.   	3. Mild opacities in the lower lobes may represent atelectasis or   pneumonia.      Care Discussed with Consultants/Other Providers: Yes, GI     RADIOLOGY & ADDITIONAL TESTS: Yes   (Imaging Personally Reviewed)

## 2019-10-18 NOTE — PROGRESS NOTE ADULT - ASSESSMENT
89F PMH Alzheimer's dementia nonverbal at baseline, s/p PEG c/b prior PEG displacements, HTN, constipation, anxiety, dysphagia, heart disease, major depressive disorder, hx ESBL UTI, aspiraiton PNA, coming from [facility] for 1-2 weeks of jaundice noted by family per notes from [facility] Records, with CT A/P showing 3.4mm CBD stone with CBD dilation, elevated AL-P to 1000s, with TBili 5.5 consistent with choledocholithasis with obstructing stone. ERCP refused, palliative care on board.

## 2019-10-18 NOTE — DISCHARGE NOTE NURSING/CASE MANAGEMENT/SOCIAL WORK - PATIENT PORTAL LINK FT
You can access the FollowMyHealth Patient Portal offered by [facility] by registering at the following website: [email or web address]. By joining [employer]’s FollowMyHealth portal, you will also be able to view your health information using other applications (apps) compatible with our system.

## 2019-10-18 NOTE — PROGRESS NOTE ADULT - PROBLEM SELECTOR PLAN 8
- IMPROVE score 2 (age >60, immobilization)  - Hep subq, SCDs  - Diet: NPO w/ tube feeds. NPO after midnight on 10/14 for ERCP tomorrow   - Dispo: pending clinical improvement - IMPROVE score 2 (age >60, immobilization)  - Hep subq, SCDs  - Dispo: pending clinical improvement

## 2019-10-18 NOTE — PROGRESS NOTE ADULT - PROBLEM SELECTOR PLAN 7
- patient with MOLST form DNR/DNI in chart from January 2018
- patient with MOLST form DNR/DNI in chart from January 2018  -palliative care recs: morphine, discussion with family to transfer pt to hospice care
- patient with MOLST form DNR/DNI in chart from January 2018

## 2019-10-24 ENCOUNTER — EMERGENCY (EMERGENCY)
Facility: HOSPITAL | Age: 84
LOS: 1 days | Discharge: HOME | End: 2019-10-24
Attending: EMERGENCY MEDICINE
Payer: MEDICARE

## 2019-10-24 VITALS
TEMPERATURE: 98 F | WEIGHT: 139.99 LBS | SYSTOLIC BLOOD PRESSURE: 98 MMHG | DIASTOLIC BLOOD PRESSURE: 60 MMHG | HEART RATE: 77 BPM | RESPIRATION RATE: 16 BRPM | OXYGEN SATURATION: 96 %

## 2019-10-24 PROCEDURE — 43762 RPLC GTUBE NO REVJ TRC: CPT | Mod: GW

## 2019-10-24 PROCEDURE — L8699: CPT

## 2019-10-24 PROCEDURE — 99285 EMERGENCY DEPT VISIT HI MDM: CPT | Mod: 25

## 2019-10-24 PROCEDURE — 99283 EMERGENCY DEPT VISIT LOW MDM: CPT

## 2019-10-24 PROCEDURE — 43762 RPLC GTUBE NO REVJ TRC: CPT

## 2019-10-24 PROCEDURE — 49465 FLUORO EXAM OF G/COLON TUBE: CPT

## 2019-10-24 NOTE — ED PROVIDER NOTE - PATIENT PORTAL LINK FT
You can access the FollowMyHealth Patient Portal offered by [facility] by registering at the following website: [email or web address]. By joining [employer]’s FollowMyHealth portal, you will also be able to view your health information using other applications (apps) compatible with our system.

## 2019-10-24 NOTE — ED ADULT NURSE NOTE - NSIMPLEMENTINTERV_GEN_ALL_ED
Implemented All Fall with Harm Risk Interventions:  [place] to call system. Call bell, personal items and telephone within reach. Instruct patient to call for assistance. Room bathroom lighting operational. Non-slip footwear when patient is off stretcher. Physically safe environment: no spills, clutter or unnecessary equipment. Stretcher in lowest position, wheels locked, appropriate side rails in place. Provide visual cue, wrist band, yellow gown, etc. Monitor gait and stability. Monitor for mental status changes and reorient to person, place, and time. Review medications for side effects contributing to fall risk. Reinforce activity limits and safety measures with patient and family. Provide visual clues: red socks.

## 2019-10-25 NOTE — ED ADULT NURSE REASSESSMENT NOTE - NS ED NURSE REASSESS COMMENT FT1
At time of discharge, [facility] reached, spoke with Nursing Supervisor Iris Barbosa to inform about patient's discharge and transport back to facility, she acknowledged information. Pt picked up by senior care in no distress.

## 2019-11-01 ENCOUNTER — EMERGENCY (EMERGENCY)
Facility: HOSPITAL | Age: 84
LOS: 1 days | Discharge: ANOTHER HOSPITAL | End: 2019-11-01
Attending: EMERGENCY MEDICINE
Payer: MEDICARE

## 2019-11-01 VITALS
DIASTOLIC BLOOD PRESSURE: 78 MMHG | HEART RATE: 85 BPM | TEMPERATURE: 99 F | OXYGEN SATURATION: 95 % | SYSTOLIC BLOOD PRESSURE: 112 MMHG | HEIGHT: 62 IN | RESPIRATION RATE: 16 BRPM | WEIGHT: 164.91 LBS

## 2019-11-01 PROCEDURE — 43762 RPLC GTUBE NO REVJ TRC: CPT | Mod: GW

## 2019-11-01 PROCEDURE — 99283 EMERGENCY DEPT VISIT LOW MDM: CPT | Mod: GW

## 2019-11-01 PROCEDURE — 43762 RPLC GTUBE NO REVJ TRC: CPT

## 2019-11-01 PROCEDURE — 74018 RADEX ABDOMEN 1 VIEW: CPT

## 2019-11-01 PROCEDURE — L8699: CPT

## 2019-11-01 PROCEDURE — 99283 EMERGENCY DEPT VISIT LOW MDM: CPT | Mod: 25

## 2019-11-01 PROCEDURE — 74018 RADEX ABDOMEN 1 VIEW: CPT | Mod: 26,GW

## 2019-11-01 NOTE — ED PROVIDER NOTE - OBJECTIVE STATEMENT
89 y/o F pt with PMHx of dementia, HTN, constipation, and depression, and PSHx of feeding tube presents to ED with a feeding tube that fell out today. Pt is nonverbal and bedridden.

## 2019-11-01 NOTE — ED PROVIDER NOTE - PATIENT PORTAL LINK FT
You can access the FollowMyHealth Patient Portal offered by [facility] by registering at the following website: [email or web address]. By joining [employer]’s FollowMyHealth portal, you will also be able to view your health information using other applications (apps) compatible with our system.

## 2019-11-01 NOTE — ED ADULT NURSE NOTE - NSIMPLEMENTINTERV_GEN_ALL_ED
Implemented All Fall with Harm Risk Interventions:  [place] to call system. Call bell, personal items and telephone within reach. Instruct patient to call for assistance. Room bathroom lighting operational. Non-slip footwear when patient is off stretcher. Physically safe environment: no spills, clutter or unnecessary equipment. Stretcher in lowest position, wheels locked, appropriate side rails in place. Provide visual cue, wrist band, yellow gown, etc. Monitor gait and stability. Monitor for mental status changes and reorient to person, place, and time. Review medications for side effects contributing to fall risk. Reinforce activity limits and safety measures with patient and family. Provide visual clues: red socks.

## 2019-11-01 NOTE — ED PROVIDER NOTE - PRO INTERPRETER NEED 2
Patient needs 6 weeks of activity modification, physical therapy, nsaid tx - MRI will not be covered at this time.      Please have patient complete PT (neck) for cervical radiculopathy.  She should also be taking the gabapentin as dir by her PMD.      Can F/U with our office in 6 weeks for re-evaluation.      HERIBERTO   Other

## 2019-11-12 NOTE — ED ADULT NURSE NOTE - NS ED NURSE RECORD ANOTHER VITAL SIGN
Yes [Abdominal Pain] : abdominal pain [As Noted in HPI] : as noted in HPI [Heartburn] : heartburn [Negative] : Heme/Lymph

## 2020-07-13 NOTE — ED PROVIDER NOTE - NS ED MD DISPO ISOLATION TYPES
Cleanse abrasion RLE with Normal saline, apply foam dressing change every 2 days.  Call if fails to improve in 5- 7 days   None

## 2021-09-05 NOTE — PATIENT PROFILE ADULT - NSPROIMPLANTSMEDDEV_GEN_A_NUR
[facility]  Hospitalist Progress Note  Phan Benjamin MD 09/05/21    Reason for Stay (Diagnosis): COPD exacerbation         Assessment and Plan:      Summary of Stay: Thierry Samuel is a 85 year old [place]ic-speaking male with a past medical history of severe COPD on 3 L via nasal cannula as needed, SUSY using CPAP, severe pulmonary hypertension, chronic heart failure with preserved ejection fraction, A. fib on warfarin status post pacemaker placement, hypertension, hyperlipidemia who presented on 9/4/2021 with generalized weakness and SOB.      Mr. Ivan Samuel was hospitalized here at [facility] from 8/10-8/12 for acute on chronic hypoxemic respiratory failure secondary to COPD exacerbation, heart failure exacerbation and possible pneumonia. He was treated with prednisone taper and doxycycline. His prednisone taper ended on 8/24 and shortly after that his respiratory status worsened and he became diffusely weak.    Here in the emergency room he was noted to have expiratory wheezing, productive cough and shortness of breath.  He was afebrile and nontachycardic.  Chest x-ray showed patchy bibasilar opacities similar to a prior x-ray.  Troponin was negative and EKG showed V paced rhythm.  He was felt to clinically have a COPD exacerbation and was started on prednisone and nebulizers.     #SOB, generalized weakness.  Suspect COPD exacerbation:    --Continue prednisone 40 mg daily.  Anticipate he will need a prolonged taper and may need to consider remaining on a low-dose of oral prednisone for period of time  --Scheduled nebs  --Start Breo Ellipta here in place of his home controller inhaler for formulary reasons  --Given productive cough and possible bibasilar infiltrates will start azithromycin  --Continue home Singulair  --Check a procalcitonin    Generalized weakness: Noted after he stopped his prednisone taper.  Suspect this was actually adrenal insufficiency.  Weakness was  dramatically improved after just 1 dose of prednisone.     #Afib on a/c s/p PPM: Continue warfarin. Pharm to dose.  Is actually slightly supratherapeutic.    Monitor on telemetry.       #Chronic HFpEF: Weight has actually been trending down.  He is followed by cardiology team.  Will continue home lasix and spironolactone. Daily weights and I/Os.      #HL: Continue statin    #SUSY: Continue home CPAP with 2L O2 bleed.      DVT Prophylaxis: Warfarin  Code Status: Full Code. Confirmed on admission  Dispo: Likely home in 1 to 2 days            Interval History (Subjective):      Patient seen and examined with his daughter at the bedside.  She elected to interpret for him though I offered to use the iPad   He feels significantly stronger today  Continues to have productive cough which was productive of yellow to tan/green sputum while I was in the room  Still with significant expiratory wheezes and tight lung sounds  Adding azithromycin and checking a procalcitonin                  Physical Exam:      Last Vital Signs:  /52 (BP Location: Left arm)   Pulse 60   Temp (!) 96.7  F (35.9  C) (Temporal)   Resp 18   Ht 1.524 m (5')   Wt 77.9 kg (171 lb 11.2 oz)   SpO2 94%   BMI 33.53 kg/m      No intake or output data in the 24 hours ending 09/05/21 1110    General: Alert, awake, no acute distress.  HEENT: NC/AT, eyes anicteric, external occular movements intact, face symmetric.    Cardiac: RRR, S1, S2.  No murmurs appreciated.  Pulmonary: Diffuse expiratory wheezes with prolonged expiratory phase.  Wet productive cough.  Lung sounds diffusely moderately diminished.    Abdomen: soft, non-tender, non-distended.  Bowel Sounds Present.  No guarding.  Extremities: no deformities.  Warm, well perfused.  Skin: no rashes or lesions noted.  Warm and Dry.  Neuro: No focal deficits noted.  Speech clear.  Coordination and strength grossly normal.  Psych: Appropriate affect.         Medications:      All current  medications were reviewed with changes reflected in problem list.         Data:      All new lab and imaging data was reviewed.   Labs:  Recent Labs   Lab 09/05/21  0712      POTASSIUM 4.7   CHLORIDE 106   CO2 24   ANIONGAP 5   *   BUN 15   CR 0.65*   GFRESTIMATED 89   CAMPOS 8.4*     Recent Labs   Lab 09/05/21  0712   WBC 5.0   HGB 13.1*   HCT 40.6   MCV 95         Imaging:   Results for orders placed or performed during the hospital encounter of 09/04/21   XR Chest Port 1 View    Narrative    EXAM: XR CHEST PORT 1 VIEW  LOCATION: [facility]  DATE/TIME: 9/4/2021 7:37 PM    INDICATION: weakness, shortness of breath  COMPARISON: Chest x-ray 08/10/2021      Impression    IMPRESSION: Stable single lead right-sided cardiac conduction device. Low lung volumes. Similar appearance of mild patchy bibasilar pulmonary opacities which may reflect atelectasis or mild infiltrates. No definite pleural effusion. Stable cardiomegaly.   No overt vascular congestion or pulmonary edema.         Phan Benjamin MD , MD.       None

## 2021-11-16 NOTE — H&P ADULT - PROBLEM SELECTOR PLAN 4
Troponin 5609.5 Troponin 6623.7 - on home losartan 25  - hold BP meds at this time in the setting of soft/target BPs off meds - on home losartan 25  - will hold BP meds at this time in the setting of soft/target BPs off meds - c/w home venlafaxine

## 2021-11-17 NOTE — ED ADULT NURSE NOTE - CAS DISCH TRANSFER METHOD
Detail Level: Zone
Plan: Treatment:\\nThis patient has been treated today with image guided superficial radiation therapy for non-melanoma skin cancer. Written informed consent has been previously obtained from this patient for this treatment. This consent is documented in the patient’s chart. The patient gave verbal consent to continue treatment today. The patient was treated with a specific radiation dose and setup as prescribed by the provider listed on this visit note. A Radiation Therapist performed administration of radiation under supervision of provider. The treatment parameters and cumulative dose are indicated above. Prior to administering the radiation, the patient underwent a verification therapeutic radiology simulation-aided field setting defining relevant normal and abnormal target anatomy and acquiring images with high frequency ultrasound in addition to data necessary developing optimal radiation treatment process for the patient. This process includes verification of the treatment port(s) and proper treatment positioning. All treatment ports were photographed within electronic medical record. The patient’s customized lead blocking along with gross tumor volume and margin was confirmed. Considering superficial radiotherapy is clinical in setup, this requires physician and radiation therapist to clarify location interest being treated against initial images, pathology and patient anatomy. Care was taken ensuring fields treated were geometrically accurate and properly positioned using therapeutic radiology simulation-aided field setting verification per fraction. This process is also utilized to determine if any prescription or setup changes are necessary. These steps are therefore medically necessary ensuring safe\\nand effective administration of radiation. Ongoing therapeutic radiology simulation-aided field setting verification is ordered throughout course of therapy.\\nA high frequency ultrasound image was acquired today for two-dimensional evaluation of the tumor volume and response to treatment, in addition to geometric accuracy of field placement. US depth Is 1.54 mm, which is +/- 0.02 mm in difference from previous imaging. The field placement and ultrasound imaging, per fraction, is separate and distinct from the initial simulation, and is an important task in providing safe administration of superficial radiation therapy. Physician evaluation of the ultrasound tumor depth will be ongoing throughout the course of treatment, and is deemed medically necessary in order to ensure the efficacy of treatment and any necessary changes. Today’s image was evaluated for determination of continuation of treatment with the current plan or with necessary changes as appropriate. According to provider review of verification therapeutic radiology simulation-aided field setting and imaging. Additionally, the use of ultrasound visualization and targeted assessment allows the patient to be able to see their cancer(s) progress, encouraging patient to complete and maintain compliance through full course of radiotherapy. Per Dr. Ma, continued ultrasound guidance and therapeutic radiology simulation-aided field setting verification per fraction is required for field placement, measurement of tumor depth, progress and acute effect monitoring.\\n
Other/SENIOR CARE

## 2022-03-28 NOTE — ED PROVIDER NOTE - GASTROINTESTINAL, MLM
"Chief Complaint   Patient presents with     Hypertension       Initial BP (!) 191/115   Pulse 89   Temp 98.1  F (36.7  C) (Tympanic)   Resp 18   Wt 125.6 kg (277 lb)   SpO2 99%   BMI 35.56 kg/m   Estimated body mass index is 35.56 kg/m  as calculated from the following:    Height as of 1/20/22: 1.88 m (6' 2\").    Weight as of this encounter: 125.6 kg (277 lb).  Medication Reconciliation: complete  Kathi Ibanez CMA  "
Abdomen soft, non-tender, no guarding. G tube in place.

## 2023-02-14 NOTE — CHART NOTE - NSCHARTNOTEFT_GEN_A_CORE
Continue meds  PSA   GI follow up    Patient seen and examined in the endoscopy unit.   Discussed the procedure and plan with daughter, who is HCP (using [place]  phone).   She would not like to proceed with ERCP. She is requesting a hospice consult and would not like to proceed with any further treatment for her mother.   Patient was sent back to the medicine floor.    No further intervention per GI.   Please call us back as needed    Liss March, PGY-6  GI fellow  B- [phone]/ [phone]  Please call GI fellow on call after 5pm and on weekends

## 2023-09-19 NOTE — PROGRESS NOTE ADULT - NSHPATTENDINGPLANDISCUSS_GEN_ALL_CORE
medical team,
Detail Level: Detailed
medical team,
medical team,Family
Quality 47: Advance Care Plan: Advance Care Planning discussed and documented in the medical record; patient did not wish or was not able to name a surrogate decision maker or provide an advance care plan.
Quality 431: Preventive Care And Screening: Unhealthy Alcohol Use - Screening: Patient not identified as an unhealthy alcohol user when screened for unhealthy alcohol use using a systematic screening method
Quality 111:Pneumonia Vaccination Status For Older Adults: Patient received any pneumococcal conjugate or polysaccharide vaccine on or after their 60th birthday and before the end of the measurement period
Quality 226: Preventive Care And Screening: Tobacco Use: Screening And Cessation Intervention: Patient screened for tobacco use and is an ex/non-smoker
medical team
medical team,Family
medical team,
Dr. Goetz, primary care
medical team,
medical team,Family

## 2023-10-17 NOTE — DISCHARGE NOTE ADULT - CARE PLAN
----- Message from Monalisa Campbell sent at 10/17/2023 11:09 AM CDT -----  Contact: KURT GOMEZ [phone]    Type: Needs Medical Advice      Who Called:  SOLITARIO GOMEZ Call Back Number: [phone]      Additional Information: Patient is calling to speak with nurse/MA regarding pending culture results.  Mrs. Hurst calling to verify mediation will be called to pharmacy pend results.   Please call back and advise. Thanks          Principal Discharge DX:	Failure to thrive in adult  Goal:	For your nutrition to improve with the feeding tube  Assessment and plan of treatment:	You know have a feeding tube in which you will receive your medications as well as nutrition.  Ensure that anyone who accesses the feeding tube washes their hands to prevent infection. After administration of medications ensure that the feeding tube is flushed with at least 30-60 mls of water to prevent the feeding tube from clogging. Principal Discharge DX:	Failure to thrive in adult  Goal:	For your nutrition to improve with the feeding tube  Assessment and plan of treatment:	You know have a feeding tube in which you will receive your medications as well as nutrition.  Ensure that anyone who accesses the feeding tube washes their hands to prevent infection. After administration of medications ensure that the feeding tube is flushed with at least 30-60 mls of water to prevent the feeding tube from clogging.  Secondary Diagnosis:	ESBL (extended spectrum beta-lactamase) producing bacteria infection  Assessment and plan of treatment:	You have completed course of antibiotics. Contact isolation discontinued. Principal Discharge DX:	Failure to thrive in adult  Goal:	For your nutrition to improve with the feeding tube  Assessment and plan of treatment:	You know have a feeding tube in which you will receive your medications as well as nutrition.  Ensure that anyone who accesses the feeding tube washes their hands to prevent infection. After administration of medications ensure that the feeding tube is flushed with at least 30-60 mls of water to prevent the feeding tube from clogging.  Secondary Diagnosis:	ESBL (extended spectrum beta-lactamase) producing bacteria infection  Assessment and plan of treatment:	You have completed course of antibiotics. Contact isolation discontinued.  Secondary Diagnosis:	HCAP (healthcare-associated pneumonia)  Assessment and plan of treatment:	continue Zosyn until 2/7/2018 for total of 7 days. Strict aspiration precautions.

## 2023-11-19 NOTE — ED ADULT NURSE NOTE - NAME OF THE PERSON WHO RECEIVED REPORT AT THE FACILITY THE PATIENT IS TRANSFERRING TO
Keep the wound clean and dry.  Bacitracin ointment twice/ day while the stitches are in.  Have the stitches removed in 5-6 days at your primary clinic or [facility] [phone].  Return for severe headache, frequent vomiting, changes in vision, strength, speech or coordination or any signs of redness or infection around the wound.  Protect the scar from the sun for at least 1 year.   RN

## 2024-04-10 NOTE — DISCHARGE NOTE ADULT - PATIENT PORTAL LINK FT
Yes “You can access the [facility] Patient Portal, offered by [facility], by registering with the following website: [email or web address]”
